# Patient Record
Sex: FEMALE | Race: BLACK OR AFRICAN AMERICAN | Employment: OTHER | URBAN - METROPOLITAN AREA
[De-identification: names, ages, dates, MRNs, and addresses within clinical notes are randomized per-mention and may not be internally consistent; named-entity substitution may affect disease eponyms.]

---

## 2017-01-09 ENCOUNTER — OFFICE VISIT (OUTPATIENT)
Dept: FAMILY MEDICINE CLINIC | Age: 62
End: 2017-01-09

## 2017-01-09 VITALS
RESPIRATION RATE: 17 BRPM | OXYGEN SATURATION: 98 % | WEIGHT: 141 LBS | TEMPERATURE: 98.1 F | SYSTOLIC BLOOD PRESSURE: 136 MMHG | DIASTOLIC BLOOD PRESSURE: 86 MMHG | HEART RATE: 90 BPM | HEIGHT: 66 IN | BODY MASS INDEX: 22.66 KG/M2

## 2017-01-09 DIAGNOSIS — J18.9 PNEUMONIA DUE TO INFECTIOUS ORGANISM, UNSPECIFIED LATERALITY, UNSPECIFIED PART OF LUNG: Primary | ICD-10-CM

## 2017-01-09 RX ORDER — PREDNISONE 10 MG/1
10 TABLET ORAL
COMMUNITY
Start: 2017-01-03 | End: 2017-06-21 | Stop reason: ALTCHOICE

## 2017-01-09 RX ORDER — LEVOFLOXACIN 500 MG/1
500 TABLET, FILM COATED ORAL DAILY
COMMUNITY
Start: 2017-01-03 | End: 2017-06-21

## 2017-01-09 NOTE — PROGRESS NOTES
Subjective:   Andrew Marie is a 63 y.o. female who was seen at [address] 1/3/17 and was diagnosed with pneumonia. She was treated with levaquin and prednisone and has not quite completed course. Feeling better but still with nagging cough - nonproductive. No recent fever. She denies a history of COPD or asthma, but had some wheezing with this illness. Tried OTC cold remedies with temporary relief. No evaluation to date. Past Medical History   Diagnosis Date    Cancer [facility])      Social History   Substance Use Topics    Smoking status: Never Smoker    Smokeless tobacco: Never Used    Alcohol use None     No current outpatient prescriptions on file prior to visit. No current facility-administered medications on file prior to visit. Allergies   Allergen Reactions    Pcn [Penicillins] Hives        Review of Systems  Pertinent items are noted in HPI. Objective:     Visit Vitals    /86 (BP 1 Location: Left arm, BP Patient Position: Sitting)    Pulse 90    Temp 98.1 °F (36.7 °C) (Oral)    Resp 17    Ht 5' 6\" (1.676 m)    Wt 141 lb (64 kg)    SpO2 98%    BMI 22.76 kg/m2     General:   alert, cooperative and no distress   Eyes: conjunctivae/scleras clear. PERRL, EOM's intact   Ears: External auditory canals clear, tympanic membranes clear   Sinuses/Nose: No maxillary or frontal tenderness. no rhinorrhea present. Mouth:  No oral lesions, mild pharyngeal erythema, no exudates   Neck: Supple, trachea midline   Heart: S1 and S2 normal,no murmurs noted    Lungs: Clear to auscultation bilaterally, no increased work of breathing   Abdomen: Soft, nontender. Normal bowel sounds   Extremities: No edema or cyanosis              Assessment/Plan:       ICD-10-CM ICD-9-CM    1. Pneumonia due to infectious organism, unspecified laterality, unspecified part of lung J18.9 136.9      484.8    Treated at outside facility, appears to be resolving well. Ok to return to work.       Orders Placed This Encounter    levoFLOXacin (LEVAQUIN) 500 mg tablet     Sig: Take 500 mg by mouth daily.  predniSONE (DELTASONE) 10 mg tablet     Sig: Take 10 mg by mouth. Verbal and written instructions (see AVS) provided. Patient expresses understanding of diagnosis and treatment plan.

## 2017-01-09 NOTE — MR AVS SNAPSHOT
Visit Information Date & Time Provider Department Dept. Phone Encounter #  
 1/9/2017  2:00 PM [facility] [phone] [id number] Upcoming Health Maintenance Date Due Hepatitis C Screening 1955 COLONOSCOPY 12/3/1975 DTaP/Tdap/Td series (1 - Tdap) 12/3/1978 PAP AKA CERVICAL CYTOLOGY 12/3/1978 INFLUENZA AGE 9 TO ADULT 8/1/2016 BREAST CANCER SCRN MAMMOGRAM 12/3/2017 Allergies as of 1/9/2017  Review Complete On: 1/9/2017 By: Aleta Yost MD  
  
 Severity Noted Reaction Type Reactions Pcn [Penicillins]  09/28/2010    Hives Current Immunizations  Reviewed on 1/19/2012 Name Date Influenza Vaccine Split 1/19/2012 Not reviewed this visit You Were Diagnosed With   
  
 Codes Comments Pneumonia due to infectious organism, unspecified laterality, unspecified part of lung    -  Primary ICD-10-CM: J18.9 ICD-9-CM: 136.9, 484.8 Vitals BP Pulse Temp Resp Height(growth percentile) Weight(growth percentile) 136/86 (BP 1 Location: Left arm, BP Patient Position: Sitting) 90 98.1 °F (36.7 °C) (Oral) 17 5' 6\" (1.676 m) 141 lb (64 kg) SpO2 BMI OB Status Smoking Status 98% 22.76 kg/m2 Postmenopausal Never Smoker BMI and BSA Data Body Mass Index Body Surface Area  
 22.76 kg/m 2 1.73 m 2 Preferred Pharmacy Pharmacy Name Phone [facility] [address] [facility] [address] [facility] [phone] Your Updated Medication List  
  
   
This list is accurate as of: 1/9/17  2:38 PM.  Always use your most recent med list.  
  
  
  
  
 levoFLOXacin 500 mg tablet Commonly known as:  Jemima Pih Take 500 mg by mouth daily. predniSONE 10 mg tablet Commonly known as:  Roe Razor Take 10 mg by mouth. Introducing [facility]!    
 [facility] introduces [facility] patient portal. Now you can access parts of your medical record, email your doctor's office, and request medication refills online. 1. In your internet browser, go to [email or web address] 2. Click on the First Time User? Click Here link in the Sign In box. You will see the New Member Sign Up page. 3. Enter your Rated People Access Code exactly as it appears below. You will not need to use this code after youve completed the sign-up process. If you do not sign up before the expiration date, you must request a new code. · Rated People Access Code: [email or web address] Expires: 4/9/2017  2:38 PM 
 
4. Enter the last four digits of your Social Security Number (xxxx) and Date of Birth (mm/dd/yyyy) as indicated and click Submit. You will be taken to the next sign-up page. 5. Create a Rated People ID. This will be your Rated People login ID and cannot be changed, so think of one that is secure and easy to remember. 6. Create a Rated People password. You can change your password at any time. 7. Enter your Password Reset Question and Answer. This can be used at a later time if you forget your password. 8. Enter your e-mail address. You will receive e-mail notification when new information is available in [address] 9. Click Sign Up. You can now view and download portions of your medical record. 10. Click the Download Summary menu link to download a portable copy of your medical information. If you have questions, please visit the Frequently Asked Questions section of the Rated People website. Remember, Rated People is NOT to be used for urgent needs. For medical emergencies, dial 911. Now available from your iPhone and Android! Please provide this summary of care documentation to your next provider. Your primary care clinician is listed as [address] If you have any questions after today's visit, please call [phone].

## 2017-04-04 ENCOUNTER — HOSPITAL ENCOUNTER (OUTPATIENT)
Dept: MAMMOGRAPHY | Age: 62
Discharge: HOME | End: 2017-04-04
Attending: FAMILY MEDICINE
Payer: COMMERCIAL

## 2017-04-04 DIAGNOSIS — Z12.31 VISIT FOR SCREENING MAMMOGRAM: ICD-10-CM

## 2017-04-04 PROCEDURE — 77067 SCR MAMMO BI INCL CAD: CPT

## 2017-06-21 ENCOUNTER — OFFICE VISIT (OUTPATIENT)
Dept: FAMILY MEDICINE CLINIC | Age: 62
End: 2017-06-21

## 2017-06-21 VITALS
HEIGHT: 66 IN | BODY MASS INDEX: 21.69 KG/M2 | RESPIRATION RATE: 14 BRPM | OXYGEN SATURATION: 99 % | WEIGHT: 135 LBS | HEART RATE: 55 BPM | DIASTOLIC BLOOD PRESSURE: 79 MMHG | TEMPERATURE: 98.1 F | SYSTOLIC BLOOD PRESSURE: 156 MMHG

## 2017-06-21 DIAGNOSIS — R41.3 EPISODIC MEMORY LOSS: Primary | ICD-10-CM

## 2017-06-21 DIAGNOSIS — Z85.3 HISTORY OF BREAST CANCER: ICD-10-CM

## 2017-06-21 DIAGNOSIS — Z11.59 NEED FOR HEPATITIS C SCREENING TEST: ICD-10-CM

## 2017-06-21 DIAGNOSIS — Z13.220 SCREENING CHOLESTEROL LEVEL: ICD-10-CM

## 2017-06-21 DIAGNOSIS — Z13.29 THYROID DISORDER SCREEN: ICD-10-CM

## 2017-06-21 DIAGNOSIS — Z12.11 SPECIAL SCREENING FOR MALIGNANT NEOPLASM OF COLON: ICD-10-CM

## 2017-06-21 LAB
BILIRUB UR QL STRIP: NEGATIVE
GLUCOSE UR-MCNC: NEGATIVE MG/DL
KETONES P FAST UR STRIP-MCNC: NEGATIVE MG/DL
PH UR STRIP: 6 [PH] (ref 4.6–8)
PROT UR QL STRIP: NEGATIVE MG/DL
SP GR UR STRIP: 1.01 (ref 1–1.03)
UA UROBILINOGEN AMB POC: NORMAL (ref 0.2–1)
URINALYSIS CLARITY POC: CLEAR
URINALYSIS COLOR POC: YELLOW
URINE BLOOD POC: NEGATIVE
URINE LEUKOCYTES POC: NORMAL
URINE NITRITES POC: NEGATIVE

## 2017-06-21 NOTE — LETTER
NOTIFICATION RETURN TO WORK / SCHOOL 
 
6/21/2017 2:55 PM 
 
Ms. Terrance Del Toro [address] [place] [id number] To Whom It May Concern: 
 
Terrance Del Toro is currently under the care of [address] [place] [address]. She was seen in our office today June 21, 2017 and may return back to work on June 22, 2017. If there are questions or concerns please have the patient contact our office. Sincerely, Fadia Quiñonez NP

## 2017-06-21 NOTE — MR AVS SNAPSHOT
Visit Information Date & Time Provider Department Dept. Phone Encounter #  
 6/21/2017  2:00 PM Michael Robledo [place] [id number] [phone] [id number] Follow-up Instructions Return in about 4 weeks (around 7/19/2017), or if symptoms worsen or fail to improve. Upcoming Health Maintenance Date Due Hepatitis C Screening 1955 DTaP/Tdap/Td series (1 - Tdap) 12/3/1978 PAP AKA CERVICAL CYTOLOGY 12/3/1978 COLONOSCOPY 6/6/2016 INFLUENZA AGE 9 TO ADULT 8/1/2017 BREAST CANCER SCRN MAMMOGRAM 4/4/2018 Allergies as of 6/21/2017  Review Complete On: 6/21/2017 By: Jaimee Hernández Severity Noted Reaction Type Reactions Pcn [Penicillins]  09/28/2010    Hives Current Immunizations  Reviewed on 1/19/2012 Name Date Influenza Vaccine Split 1/19/2012 Not reviewed this visit You Were Diagnosed With   
  
 Codes Comments Episodic memory loss    -  Primary ICD-10-CM: R41.3 ICD-9-CM: 780.93 History of breast cancer     ICD-10-CM: Z85.3 ICD-9-CM: V10.3 Need for hepatitis C screening test     ICD-10-CM: Z11.59 
ICD-9-CM: V73.89 Thyroid disorder screen     ICD-10-CM: Z13.29 ICD-9-CM: V77.0 Screening cholesterol level     ICD-10-CM: P57.287 ICD-9-CM: V77.91 Special screening for malignant neoplasm of colon     ICD-10-CM: Z12.11 ICD-9-CM: V76.51 Vitals BP Pulse Temp Resp Height(growth percentile) Weight(growth percentile) 156/79 (BP 1 Location: Left arm, BP Patient Position: Sitting) (!) 55 98.1 °F (36.7 °C) (Oral) 14 5' 6\" (1.676 m) 135 lb (61.2 kg) SpO2 BMI OB Status Smoking Status 99% 21.79 kg/m2 Postmenopausal Never Smoker Vitals History BMI and BSA Data Body Mass Index Body Surface Area 21.79 kg/m 2 1.69 m 2 Preferred Pharmacy Pharmacy Name Phone [facility] [address] [facility] [address] [phone] Your Updated Medication List  
  
Notice  As of 6/21/2017  2:47 PM  
 You have not been prescribed any medications. We Performed the Following AMB POC URINALYSIS DIP STICK AUTO W/O MICRO [99946 CPT(R)] CBC W/O DIFF [77296 CPT(R)] HEPATITIS C AB [51133 CPT(R)] LIPID PANEL [61786 CPT(R)] METABOLIC PANEL, COMPREHENSIVE [16770 CPT(R)] REFERRAL FOR COLONOSCOPY [BET772 Custom] Comments:  
 Please evaluate patient for colonoscopy, last done 6/6/11 and had polyp removed. T4, FREE [id number] CPT(R)] TSH 3RD GENERATION [51786 CPT(R)] Follow-up Instructions Return in about 4 weeks (around 7/19/2017), or if symptoms worsen or fail to improve. Referral Information Referral ID Referred By Referred To  
  
 [id number] [facility] [place] [address] [place], [address] [facility] Visits Status Start Date End Date 1 New Request 6/21/17 6/21/18 If your referral has a status of pending review or denied, additional information will be sent to support the outcome of this decision. Patient Instructions Learning About Chemo Brain What is chemo brain? Chemo brain is a problem with thinking and memory that can happen during and especially after chemotherapy treatment for cancer. It can make it hard for you to think, concentrate, and do tasks. You may have trouble remembering things. And you may feel like your brain isn't working right. It can be frightening to have this happen, especially during an already stressful time. These problems can be mild. But they can be so serious that people have a hard time working or doing their daily activities. What causes chemo brain? These thinking and memory problems may be caused by chemotherapy medicines used to treat cancer. They could occur because of the cancer itself and maybe because of other medicines used to treat cancer.  The anxiety and stress of having cancer also may make it harder to think and remember. What are the symptoms? Symptoms vary depending on the person. But you may: · Forget events, names, or other things. · Have trouble thinking of certain words when you talk. · Have trouble learning new things. · Take longer to do routine tasks. · Have trouble concentrating or feel like your mind is in a fog. How is chemo brain treated? Chemo brain may go away when treatment ends. If your symptoms are mild, they may go away without treatment. If your symptoms are very bad, your doctor may suggest that you see a specialist who is an expert in thinking and memory problems. What can you do to cope? Take care of yourself · Try to relax to reduce your stress. Meditate, or do yoga or another relaxing activity. · Try to be patient with yourself. The problem may go away with time. · Get plenty of sleep. · Eat a healthy diet. · Be as physically active as you can. But check with your doctor to make sure that you don't do too much too soon. · Keep your brain active by reading and doing puzzles, games, or crosswords. Use memory aids · Use sticky notes and calendars to help you remember events and tasks. · Carry a notebook to write down important dates, to-do lists, and names of people. · Try to have a routine for daily tasks so you get used to doing the same things in the same order every day. · Bring a family member or friend to doctor visits. Or use your phone or another device to record your talk with your doctor. · Keep a diary or journal. Write down when your mind feels the most clear and when you have trouble. Note how much sleep you had, if you were stressed, or other things that happened. Seek support · Tell your family and close friends about the problem so they know what's going on if you forget words or seem foggy. Suggest ways they can help you.  
· See an oncology social worker if you are having trouble coping with memory problems. · Think about joining a support group for people in cancer treatment. They may have the same problems. You can share ideas. Follow-up care is a key part of your treatment and safety. Be sure to make and go to all appointments, and call your doctor if you are having problems. It's also a good idea to know your test results and keep a list of the medicines you take. Where can you learn more? Go to http://shelby-shanika.info/. Enter O958 in the search box to learn more about \"Learning About Chemo Brain. \" Current as of: October 4, 2016 Content Version: 11.3 © 2529-4110 [employer]. Care instructions adapted under license by Monitor[employer] (which disclaims liability or warranty for this information). If you have questions about a medical condition or this instruction, always ask your healthcare professional. [address] any warranty or liability for your use of this information. Introducing [facility]! Eri Gardner introduces [employer] Ziggy patient portal. Now you can access parts of your medical record, email your doctor's office, and request medication refills online. 1. In your internet browser, go to [email or web address] 2. Click on the First Time User? Click Here link in the Sign In box. You will see the New Member Sign Up page. 3. Enter your Ask Ziggy Access Code exactly as it appears below. You will not need to use this code after youve completed the sign-up process. If you do not sign up before the expiration date, you must request a new code. · Ask Ziggy Access Code: [facility] Expires: 9/19/2017  1:40 PM 
 
4. Enter the last four digits of your Social Security Number (xxxx) and Date of Birth (mm/dd/yyyy) as indicated and click Submit. You will be taken to the next sign-up page. 5. Create a Ask Ziggy ID.  This will be your Ask Ziggy login ID and cannot be changed, so think of one that is secure and easy to remember. 6. Create a Omek Interactive password. You can change your password at any time. 7. Enter your Password Reset Question and Answer. This can be used at a later time if you forget your password. 8. Enter your e-mail address. You will receive e-mail notification when new information is available in [address] 9. Click Sign Up. You can now view and download portions of your medical record. 10. Click the Download Summary menu link to download a portable copy of your medical information. If you have questions, please visit the Frequently Asked Questions section of the [employer]k Interactive website. Remember, [employer] is NOT to be used for urgent needs. For medical emergencies, dial 911. Now available from your iPhone and Android! Please provide this summary of care documentation to your next provider. Your primary care clinician is listed as NAYAN BRADY. If you have any questions after today's visit, please call [phone].

## 2017-06-21 NOTE — PATIENT INSTRUCTIONS
Learning About Chemo Brain  What is chemo brain? Chemo brain is a problem with thinking and memory that can happen during and especially after chemotherapy treatment for cancer. It can make it hard for you to think, concentrate, and do tasks. You may have trouble remembering things. And you may feel like your brain isn't working right. It can be frightening to have this happen, especially during an already stressful time. These problems can be mild. But they can be so serious that people have a hard time working or doing their daily activities. What causes chemo brain? These thinking and memory problems may be caused by chemotherapy medicines used to treat cancer. They could occur because of the cancer itself and maybe because of other medicines used to treat cancer. The anxiety and stress of having cancer also may make it harder to think and remember. What are the symptoms? Symptoms vary depending on the person. But you may:  · Forget events, names, or other things. · Have trouble thinking of certain words when you talk. · Have trouble learning new things. · Take longer to do routine tasks. · Have trouble concentrating or feel like your mind is in a fog. How is chemo brain treated? Chemo brain may go away when treatment ends. If your symptoms are mild, they may go away without treatment. If your symptoms are very bad, your doctor may suggest that you see a specialist who is an expert in thinking and memory problems. What can you do to cope? Take care of yourself  · Try to relax to reduce your stress. Meditate, or do yoga or another relaxing activity. · Try to be patient with yourself. The problem may go away with time. · Get plenty of sleep. · Eat a healthy diet. · Be as physically active as you can. But check with your doctor to make sure that you don't do too much too soon. · Keep your brain active by reading and doing puzzles, games, or crosswords.   Use memory aids  · Use sticky notes and calendars to help you remember events and tasks. · Carry a notebook to write down important dates, to-do lists, and names of people. · Try to have a routine for daily tasks so you get used to doing the same things in the same order every day. · Bring a family member or friend to doctor visits. Or use your phone or another device to record your talk with your doctor. · Keep a diary or journal. Write down when your mind feels the most clear and when you have trouble. Note how much sleep you had, if you were stressed, or other things that happened. Seek support  · Tell your family and close friends about the problem so they know what's going on if you forget words or seem foggy. Suggest ways they can help you. · See an oncology social worker if you are having trouble coping with memory problems. · Think about joining a support group for people in cancer treatment. They may have the same problems. You can share ideas. Follow-up care is a key part of your treatment and safety. Be sure to make and go to all appointments, and call your doctor if you are having problems. It's also a good idea to know your test results and keep a list of the medicines you take. Where can you learn more? Go to http://shelby-shanika.info/. Enter T480 in the search box to learn more about \"Learning About Chemo Brain. \"  Current as of: October 4, 2016  Content Version: 11.3  © 0760-8360 [employer]. Care instructions adapted under license by [employer] (which disclaims liability or warranty for this information). If you have questions about a medical condition or this instruction, always ask your healthcare professional. [address] any warranty or liability for your use of this information.

## 2017-06-21 NOTE — PROGRESS NOTES
Chief Complaint   Patient presents with    Memory Loss     forgetful     Patient states \" I went to the place I grew up at and couldn't remember my way. It is a place [facility]. I was going to a block party. I could not remember how to get there or get back. My sister had to carry me home. I am forgetting to pay my bills sometimes. Sometimes I can not find my keys. \"      Patient is due for a pap smear.

## 2017-06-21 NOTE — PROGRESS NOTES
Subjective:     Chief Complaint   Patient presents with    Memory Loss     forgetful        She  is a 63 y.o. female who presents for evaluation of forgetfulness. Says that for the past month she has been forgetting to pay her bills, recently went to the area that she grew up at and could not remember how to get around. She says she often loses her keys. Once, she went out for lunch at work and could not remember how to get back to where she worked, says that she had to sit and think and then remembered. She admits that she has had increase stress at work. She says she has not been sleeping well for about 1-2 months. She says she wakes up at 2am and then cannot fall back asleep (but goes to bed at 7pm and sleeps until 2 am, normally has to get up at 3:30am to go to work). She says she feels tired much of the time. Denies any trauma to her head. She says she eats \"pretty good\" but lately she says she goes \"all day without feeling like eating and then some days my appetite is real good\". She says she drinks plenty of water. She denies any vision changes. She denies any family history of dementia or alzheimers  She denies and tick bites. Does not drink or smoke. Is not on any daily medication. She has been working at the same job for 15 years. Does 10 minutes of exercise per day at work and her job is pretty active. No new stressors at home. She does recall that she had breast cancer and took tamoxifen for 5 years, completed in 2013.       ROS:  Gen: no fever, chills  Eyes: no excessive tearing, itching, or discharge  Nose: no rhinorrhea, no sinus pain  Mouth: no oral lesions, no sore throat  Resp: no shortness of breath, no wheezing, no cough  CV: no chest pain, no paroxysmal nocturnal dyspnea  Abd: no nausea, no heartburn, no diarrhea, no constipation, no abdominal pain  Neuro: no headaches, no syncope or presyncopal episodes  Endo: +polyuria, says that she gets up 2-3 times per night to urinate, no polydipsia  Heme: no lymphadenopathy, no easy bruising or bleeding  Rest per HPI    Past Medical History:   Diagnosis Date    Breast cancer ([address] 2008    Left    Cancer [facility])     Radiation therapy complication     Lt breast 2008--no chemo     Past Surgical History:   Procedure Laterality Date    HX BREAST LUMPECTOMY Left 2008    JAVIER US BX BREAST LT 1ST LESION W/CLIP AND SPECIMEN Left 2008    multiple areas sampled--one area breast CA     Current Outpatient Prescriptions on File Prior to Visit   Medication Sig Dispense Refill    levoFLOXacin (LEVAQUIN) 500 mg tablet Take 500 mg by mouth daily.  predniSONE (DELTASONE) 10 mg tablet Take 10 mg by mouth. No current facility-administered medications on file prior to visit. Objective:     Vitals:    06/21/17 1351   BP: 156/79   Pulse: (!) 55   Resp: 14   Temp: 98.1 °F (36.7 °C)   TempSrc: Oral   SpO2: 99%   Weight: 135 lb (61.2 kg)   Height: 5' 6\" (1.676 m)     Gen: alert, oriented, no acute distress  Head: normocephalic, atraumatic  Ears: external auditory canals clear, TMs without erythema or effusion  Eyes: pupils equal round reactive to light, sclera clear, conjunctiva clear  Nose: normal turbinates, no rhinorrhea  Oral: moist mucus membranes, no oral lesions, no pharyngeal inflammation or exudate  Neck: supple, no lymphadenopathy  Resp: no increased work of breathing, lungs clear to ausculation bilaterally  CV: S1, S2 normal, no murmurs, rubs, or gallops. Abd: soft, not tender, not distended. No hepatosplenomegaly. Normal bowel sounds. No hernias. Neuro: cranial nerves intact, normal strength and movement in all extremities, reflexes and sensation intact and symmetric. Skin: no lesion or rash    MME in office normal.   Able to recall events and numbers and birthdays and phone numbers.     Results for orders placed or performed in visit on 06/21/17   AMB POC URINALYSIS DIP STICK AUTO W/O MICRO   Result Value Ref Range Color (UA POC) Yellow     Clarity (UA POC) Clear     Glucose (UA POC) Negative Negative    Bilirubin (UA POC) Negative Negative    Ketones (UA POC) Negative Negative    Specific gravity (UA POC) 1.015 1.001 - 1.035    Blood (UA POC) Negative Negative    pH (UA POC) 6.0 4.6 - 8.0    Protein (UA POC) Negative Negative mg/dL    Urobilinogen (UA POC) 0.2 mg/dL 0.2 - 1    Nitrites (UA POC) Negative Negative    Leukocyte esterase (UA POC) Trace Negative         Assessment/ Plan:   Differential diagnosis and treatment options reviewed with patient who is in agreement with treatment plan as outlined below. ICD-10-CM ICD-9-CM    1. Episodic memory loss R41.3 780.93 AMB POC URINALYSIS DIP STICK AUTO W/O MICRO      TSH 3RD GENERATION      T4, FREE      METABOLIC PANEL, COMPREHENSIVE      CBC W/O DIFF   2. History of breast cancer Z85.3 V10.3    3. Need for hepatitis C screening test Z11.59 V73.89 HEPATITIS C AB   4. Thyroid disorder screen Z13.29 V77.0 TSH 3RD GENERATION      T4, FREE   5. Screening cholesterol level Z13.220 V77.91 LIPID PANEL   6. Special screening for malignant neoplasm of colon Z12.11 V76.51 REFERRAL FOR COLONOSCOPY     Labs today . If all normal, will set up with neuro for further eval.    ? Potential residual \"chemo brain\". Encouraged patient to eat well balanced  diet high in raw fruits and vegetables, lean protein and good fats. Limit refined, processed carbohydrates and sugar. Encouraged regular exercise and adequate water hydration. Asked her to journal events that she is forgetting and bring to follow up. I have discussed the diagnosis with the patient and the intended plan as seen in the above orders. The patient has received an after-visit summary and questions were answered concerning future plans. I have discussed medication side effects and warnings with the patient as well. The patient verbalizes understanding and agreement with the plan.     Follow-up Disposition:  Return in about 4 weeks (around 7/19/2017), or if symptoms worsen or fail to improve.

## 2017-06-22 LAB
ALBUMIN SERPL-MCNC: 4.7 G/DL (ref 3.5–5.5)
ALBUMIN/GLOB SERPL: 1.9 {RATIO} (ref 1.2–2.2)
ALP SERPL-CCNC: 32 IU/L (ref 39–117)
ALT SERPL-CCNC: 21 IU/L (ref 0–32)
AST SERPL-CCNC: 22 IU/L (ref 0–40)
BILIRUB SERPL-MCNC: 0.8 MG/DL (ref 0–1.2)
BUN SERPL-MCNC: 14 MG/DL (ref 6–24)
BUN/CREAT SERPL: 22 (ref 9–23)
CALCIUM SERPL-MCNC: 10 MG/DL (ref 8.7–10.2)
CHLORIDE SERPL-SCNC: 102 MMOL/L (ref 96–106)
CHOLEST SERPL-MCNC: 237 MG/DL (ref 100–199)
CO2 SERPL-SCNC: 25 MMOL/L (ref 18–29)
CREAT SERPL-MCNC: 0.65 MG/DL (ref 0.57–1)
ERYTHROCYTE [DISTWIDTH] IN BLOOD BY AUTOMATED COUNT: 13 % (ref 12.3–15.4)
GLOBULIN SER CALC-MCNC: 2.5 G/DL (ref 1.5–4.5)
GLUCOSE SERPL-MCNC: 84 MG/DL (ref 65–99)
HCT VFR BLD AUTO: 35.2 % (ref 34–46.6)
HCV AB S/CO SERPL IA: <0.1 S/CO RATIO (ref 0–0.9)
HDLC SERPL-MCNC: 85 MG/DL
HGB BLD-MCNC: 12.1 G/DL (ref 11.1–15.9)
INTERPRETATION, 910389: NORMAL
LDLC SERPL CALC-MCNC: 143 MG/DL (ref 0–99)
MCH RBC QN AUTO: 28.2 PG (ref 26.6–33)
MCHC RBC AUTO-ENTMCNC: 34.4 G/DL (ref 31.5–35.7)
MCV RBC AUTO: 82 FL (ref 79–97)
PLATELET # BLD AUTO: 280 X10E3/UL (ref 150–379)
POTASSIUM SERPL-SCNC: 4.3 MMOL/L (ref 3.5–5.2)
PROT SERPL-MCNC: 7.2 G/DL (ref 6–8.5)
RBC # BLD AUTO: 4.29 X10E6/UL (ref 3.77–5.28)
SODIUM SERPL-SCNC: 143 MMOL/L (ref 134–144)
T4 FREE SERPL-MCNC: 0.91 NG/DL (ref 0.82–1.77)
TRIGL SERPL-MCNC: 45 MG/DL (ref 0–149)
TSH SERPL DL<=0.005 MIU/L-ACNC: 3.07 UIU/ML (ref 0.45–4.5)
VLDLC SERPL CALC-MCNC: 9 MG/DL (ref 5–40)
WBC # BLD AUTO: 3.7 X10E3/UL (ref 3.4–10.8)

## 2017-06-30 ENCOUNTER — TELEPHONE (OUTPATIENT)
Dept: FAMILY MEDICINE CLINIC | Age: 62
End: 2017-06-30

## 2017-06-30 DIAGNOSIS — E78.5 HYPERLIPIDEMIA, UNSPECIFIED HYPERLIPIDEMIA TYPE: ICD-10-CM

## 2017-06-30 DIAGNOSIS — R41.3 MEMORY LOSS: Primary | ICD-10-CM

## 2017-06-30 RX ORDER — PRAVASTATIN SODIUM 10 MG/1
10 TABLET ORAL
Qty: 30 TAB | Refills: 6 | Status: SHIPPED | OUTPATIENT
Start: 2017-06-30 | End: 2018-09-07 | Stop reason: SDUPTHER

## 2017-06-30 NOTE — PROGRESS NOTES
Called patient . Patient verified her name and . Patient given results per [facility], NP's result note. Patient will start statin therapy please send to [facility]. Please put in order for CT at [facility]. Patient verbalized understanding.

## 2017-06-30 NOTE — PROGRESS NOTES
I tried to call her last week but did not get an answer. Can you try to call her again please. Her labs are back. Cholesterol level is a little more elevated and she should consider starting a statin to lower her cardiovascular/ neurological  risks. Let me know if she is ok with that and I will send to her pharmacy (what pharmacy would she like to use?). She should also take a baby aspirin daily with food. As for her memory, I would like to order a CT of her head and refer to see neurology for further evaluation of her memory complaints. Would she be ok going to [id number] [facility] for the CT and neurology?     Thanks  Jose Maldonado [facility]

## 2017-08-07 ENCOUNTER — HOSPITAL ENCOUNTER (OUTPATIENT)
Dept: CT IMAGING | Age: 62
Discharge: HOME | End: 2017-08-07
Attending: NURSE PRACTITIONER
Payer: COMMERCIAL

## 2017-08-07 DIAGNOSIS — R41.3 MEMORY LOSS: ICD-10-CM

## 2017-08-07 PROCEDURE — 70450 CT HEAD/BRAIN W/O DYE: CPT

## 2017-08-09 ENCOUNTER — HOSPITAL ENCOUNTER (OUTPATIENT)
Dept: LAB | Age: 62
Discharge: HOME | End: 2017-08-09
Payer: COMMERCIAL

## 2017-08-09 ENCOUNTER — OFFICE VISIT (OUTPATIENT)
Dept: FAMILY MEDICINE CLINIC | Age: 62
End: 2017-08-09

## 2017-08-09 VITALS
RESPIRATION RATE: 16 BRPM | TEMPERATURE: 98.4 F | HEART RATE: 48 BPM | HEIGHT: 66 IN | WEIGHT: 135.2 LBS | BODY MASS INDEX: 21.73 KG/M2 | OXYGEN SATURATION: 100 % | DIASTOLIC BLOOD PRESSURE: 76 MMHG | SYSTOLIC BLOOD PRESSURE: 128 MMHG

## 2017-08-09 DIAGNOSIS — E78.5 HYPERLIPIDEMIA, UNSPECIFIED HYPERLIPIDEMIA TYPE: ICD-10-CM

## 2017-08-09 DIAGNOSIS — R41.3 MEMORY LOSS: ICD-10-CM

## 2017-08-09 DIAGNOSIS — Z23 NEED FOR TDAP VACCINATION: ICD-10-CM

## 2017-08-09 DIAGNOSIS — Z01.419 ENCOUNTER FOR WELL WOMAN EXAM WITH ROUTINE GYNECOLOGICAL EXAM: Primary | ICD-10-CM

## 2017-08-09 PROCEDURE — 87624 HPV HI-RISK TYP POOLED RSLT: CPT | Performed by: NURSE PRACTITIONER

## 2017-08-09 PROCEDURE — 88175 CYTOPATH C/V AUTO FLUID REDO: CPT | Performed by: NURSE PRACTITIONER

## 2017-08-09 RX ORDER — ASPIRIN 81 MG/1
81 TABLET ORAL DAILY
Qty: 30 TAB | Refills: 0 | Status: SHIPPED | OUTPATIENT
Start: 2017-08-09 | End: 2019-06-27

## 2017-08-09 NOTE — PROGRESS NOTES
Chief Complaint   Patient presents with    Complete Physical     w/Pap     1. Have you been to the ER, urgent care clinic since your last visit? Hospitalized since your last visit? No    2. Have you seen or consulted any other health care providers outside of the [address] [place] [address] since your last visit? Include any pap smears or colon screening.  No

## 2017-08-09 NOTE — MR AVS SNAPSHOT
Visit Information Date & Time Provider Department Dept. Phone Encounter #  
 8/9/2017 10:30 AM Luis Moffett NP Deonna [facility]comb [place] [id number] [phone] [id number] Upcoming Health Maintenance Date Due DTaP/Tdap/Td series (1 - Tdap) 12/3/1978 PAP AKA CERVICAL CYTOLOGY 12/3/1978 COLONOSCOPY 6/6/2016 BREAST CANCER SCRN MAMMOGRAM 4/4/2018 Allergies as of 8/9/2017  Review Complete On: 8/9/2017 By: Luis Moffett NP Severity Noted Reaction Type Reactions Pcn [Penicillins]  09/28/2010    Hives Current Immunizations  Reviewed on 1/19/2012 Name Date Influenza Vaccine Split 1/19/2012 Not reviewed this visit You Were Diagnosed With   
  
 Codes Comments Encounter for well woman exam with routine gynecological exam    -  Primary ICD-10-CM: V62.271 ICD-9-CM: V72.31 Memory loss     ICD-10-CM: R41.3 ICD-9-CM: 780.93 Hyperlipidemia, unspecified hyperlipidemia type     ICD-10-CM: E78.5 ICD-9-CM: 272.4 Vitals BP Pulse Temp Resp Height(growth percentile) Weight(growth percentile) 128/76 (!) 48 98.4 °F (36.9 °C) (Oral) 16 5' 6\" (1.676 m) 135 lb 3.2 oz (61.3 kg) SpO2 BMI OB Status Smoking Status 100% 21.82 kg/m2 Postmenopausal Never Smoker Vitals History BMI and BSA Data Body Mass Index Body Surface Area  
 21.82 kg/m 2 1.69 m 2 Preferred Pharmacy Pharmacy Name Phone [facility] [address] [place] [address], [address] [place] [address] [facility] Your Updated Medication List  
  
   
This list is accurate as of: 8/9/17 11:44 AM.  Always use your most recent med list.  
  
  
  
  
 aspirin delayed-release 81 mg tablet Take 1 Tab by mouth daily. pravastatin 10 mg tablet Commonly known as:  PRAVACHOL Take 1 Tab by mouth nightly. Prescriptions Sent to Pharmacy Refills  
 aspirin delayed-release 81 mg tablet 0 Sig: Take 1 Tab by mouth daily.   
 Class: Normal  
 Pharmacy: [facility] [address] [place], [place]  #: [phone] Route: Oral  
  
We Performed the Following REFERRAL TO NEUROLOGY [FFU30 Custom] Comments:  
 Memory loss over the past few months, normal CT Referral Information Referral ID Referred By Referred To  
  
 [id number] CA[facility] [address] Loly Maria MD   
   [address] [place] [address] [address] Phone: [phone] Fax: [phone] Visits Status Start Date End Date 1 New Request 8/9/17 8/9/18 If your referral has a status of pending review or denied, additional information will be sent to support the outcome of this decision. Patient Instructions Well Visit, Women 48 to 72: Care Instructions Your Care Instructions Physical exams can help you stay healthy. Your doctor has checked your overall health and may have suggested ways to take good care of yourself. He or she also may have recommended tests. At home, you can help prevent illness with healthy eating, regular exercise, and other steps. Follow-up care is a key part of your treatment and safety. Be sure to make and go to all appointments, and call your doctor if you are having problems. It's also a good idea to know your test results and keep a list of the medicines you take. How can you care for yourself at home? · Reach and stay at a healthy weight. This will lower your risk for many problems, such as obesity, diabetes, heart disease, and high blood pressure. · Get at least 30 minutes of exercise on most days of the week. Walking is a good choice. You also may want to do other activities, such as running, swimming, cycling, or playing tennis or team sports. · Do not smoke. Smoking can make health problems worse. If you need help quitting, talk to your doctor about stop-smoking programs and medicines. These can increase your chances of quitting for good. · Protect your skin from too much sun. When you're outdoors from 10 a.m. to 4 p.m., stay in the shade or cover up with clothing and a hat with a wide brim. Wear sunglasses that block UV rays. Even when it's cloudy, put broad-spectrum sunscreen (SPF 30 or higher) on any exposed skin. · See a dentist one or two times a year for checkups and to have your teeth cleaned. · Wear a seat belt in the car. · Limit alcohol to 1 drink a day. Too much alcohol can cause health problems. Follow your doctor's advice about when to have certain tests. These tests can spot problems early. · Cholesterol. Your doctor will tell you how often to have this done based on your age, family history, or other things that can increase your risk for heart attack and stroke. · Blood pressure. Have your blood pressure checked during a routine doctor visit. Your doctor will tell you how often to check your blood pressure based on your age, your blood pressure results, and other factors. · Mammogram. Ask your doctor how often you should have a mammogram, which is an X-ray of your breasts. A mammogram can spot breast cancer before it can be felt and when it is easiest to treat. · Pap test and pelvic exam. Ask your doctor how often you should have a Pap test. You may not need to have a Pap test as often as you used to. · Vision. Have your eyes checked every year or two or as often as your doctor suggests. Some experts recommend that you have yearly exams for glaucoma and other age-related eye problems starting at age 48. · Hearing. Tell your doctor if you notice any change in your hearing. You can have tests to find out how well you hear. · Diabetes. Ask your doctor whether you should have tests for diabetes. · Colon cancer. You should begin tests for colon cancer at age 48. You may have one of several tests. Your doctor will tell you how often to have tests based on your age and risk.  Risks include whether you already had a precancerous polyp removed from your colon or whether your parents, sisters and brothers, or children have had colon cancer. · Thyroid disease. Talk to your doctor about whether to have your thyroid checked as part of a regular physical exam. Women have an increased chance of a thyroid problem. · Osteoporosis. You should begin tests for bone density at age 72. If you are younger than 72, ask your doctor whether you have factors that may increase your risk for this disease. You may want to have this test before age 72. · Heart attack and stroke risk. At least every 4 to 6 years, you should have your risk for heart attack and stroke assessed. Your doctor uses factors such as your age, blood pressure, cholesterol, and whether you smoke or have diabetes to show what your risk for a heart attack or stroke is over the next 10 years. When should you call for help? Watch closely for changes in your health, and be sure to contact your doctor if you have any problems or symptoms that concern you. Where can you learn more? Go to http://shelby-shanika.info/. Enter F550 in the search box to learn more about \"Well Visit, Women 50 to 72: Care Instructions. \" Current as of: July 19, 2016 Content Version: 11.3 © 5677-9340 [employer]. Care instructions adapted under license by [employer] (which disclaims liability or warranty for this information). If you have questions about a medical condition or this instruction, always ask your healthcare professional. [place] [address] any warranty or liability for your use of this information. Learning About High Cholesterol What is high cholesterol? Cholesterol is a type of fat in your blood. It is needed for many body functions, such as making new cells. Cholesterol is made by your body. It also comes from food you eat. If you have too much cholesterol, it starts to build up in your arteries. This is called hardening of the arteries, or atherosclerosis. High cholesterol raises your risk of a heart attack and stroke. There are different types of cholesterol. LDL is the \"bad\" cholesterol. High LDL can raise your risk for heart disease, heart attack, and stroke. HDL is the \"good\" cholesterol. High HDL is linked with a lower risk for heart disease, heart attack, and stroke. Your cholesterol levels help your doctor find out your risk for having a heart attack or stroke. How can you prevent high cholesterol? A heart-healthy lifestyle can help you prevent high cholesterol. This lifestyle helps lower your risk for a heart attack and stroke. · Eat heart-healthy foods. ¨ Eat fruits, vegetables, whole grains (like oatmeal), dried beans and peas, nuts and seeds, soy products (like tofu), and fat-free or low-fat dairy products. ¨ Replace butter, margarine, and hydrogenated or partially hydrogenated oils with olive and canola oils. (Canola oil margarine without trans fat is fine.) ¨ Replace red meat with fish, poultry, and soy protein (like tofu). ¨ Limit processed and packaged foods like chips, crackers, and cookies. · Be active. Exercise can improve your cholesterol level. Get at least 30 minutes of exercise on most days of the week. Walking is a good choice. You also may want to do other activities, such as running, swimming, cycling, or playing tennis or team sports. · Stay at a healthy weight. Lose weight if you need to. · Don't smoke. If you need help quitting, talk to your doctor about stop-smoking programs and medicines. These can increase your chances of quitting for good. How is high cholesterol treated? The goal of treatment is to reduce your chances of having a heart attack or stroke. The goal is not to lower your cholesterol numbers only. · You may make lifestyle changes, such as eating healthy foods, not smoking, losing weight, and being more active. · You may have to take medicine. Follow-up care is a key part of your treatment and safety. Be sure to make and go to all appointments, and call your doctor if you are having problems. It's also a good idea to know your test results and keep a list of the medicines you take. Where can you learn more? Go to http://shelby-shanika.info/. Enter Y615 in the search box to learn more about \"Learning About High Cholesterol. \" Current as of: April 3, 2017 Content Version: 11.3 © 6345-4045 Elias Borges [employer]. Care instructions adapted under license by [employer] (which disclaims liability or warranty for this information). If you have questions about a medical condition or this instruction, always ask your healthcare professional. [address] any warranty or liability for your use of this information. Introducing [facility]! Bhupinder Daly introduces [email or web address] patient portal. Now you can access parts of your medical record, email your doctor's office, and request medication refills online. 1. In your internet browser, go to [email or web address] 2. Click on the First Time User? Click Here link in the Sign In box. You will see the New Member Sign Up page. 3. Enter your [email or web address] Access Code exactly as it appears below. You will not need to use this code after youve completed the sign-up process. If you do not sign up before the expiration date, you must request a new code. · [email or web address] Access Code: [facility] Expires: 9/19/2017  1:40 PM 
 
4. Enter the last four digits of your Social Security Number (xxxx) and Date of Birth (mm/dd/yyyy) as indicated and click Submit. You will be taken to the next sign-up page. 5. Create a zePASSt ID. This will be your [email or web address] login ID and cannot be changed, so think of one that is secure and easy to remember. 6. Create a zePASSt password. You can change your password at any time. 7. Enter your Password Reset Question and Answer. This can be used at a later time if you forget your password. 8. Enter your e-mail address. You will receive e-mail notification when new information is available in [address] 9. Click Sign Up. You can now view and download portions of your medical record. 10. Click the Download Summary menu link to download a portable copy of your medical information. If you have questions, please visit the Frequently Asked Questions section of the [employer] website. Remember, [employer] is NOT to be used for urgent needs. For medical emergencies, dial 911. Now available from your iPhone and Android! Please provide this summary of care documentation to your next provider. Your primary care clinician is listed as NAYAN BRADY. If you have any questions after today's visit, please call [phone].

## 2017-08-09 NOTE — PROGRESS NOTES
Chief Complaint   Patient presents with    Complete Physical     w/Pap         S: Abdi Jansen is a 63 y.o. female No obstetric history on file. who presents for well woman exam and pap. Last pap- due today  Last Mammogram- history of breast cancer left, treated by Dr Alannah Dennis   Colonoscopy- done 5 years ago, due now. Has referral for colonoscopy but has not scheduled yet. Routine Labs reviewed- recently noted to have elevated cholesterol level and started on statin a few weeks ago. No LMP recorded. Patient is postmenopausal..    Sex-  Decreased sex drive since she was treated for breast CA (2013). Is followed by oncology   Birth control  Children- one daughter  Denies excessive cramping, bloating, moodiness or breast tenderness. Denies other GYN symptoms including discharge, itching. She does complain of decreased sex drive. She still complains of memory loss being of concern. Having issues with recall \"cant remember directions to places and I forget stuff all the time\". Head CT done a few days ago and normal.   No worsening of memory, no falls or head injury. No balance problems, no paralysis or chocking. No slurring of words or HA. Feels that her vision is normal.        Denies any systemic symptoms including fever, myalgias, chills, weakness, weight loss and fatigue. Denies respiratory symptoms including cough, SOB, or wheezing. Denies any cardiac symptoms including chest pain, tightness or discomfort or syncope. ROS is otherwise negative. Nutrition: eats well and takes multivitamin. Physical excercise: active daily  Social:  Denies any recent stressors.    Tobacco: Denies smoking or use of other tobacco products  Alcohol: Denies alcohol use    Past Medical History:   Diagnosis Date    Breast cancer ([facility] [address] 2008    Left    Cancer ([facility] [address]     Hyperlipidemia 6/30/2017    Radiation therapy complication     Lt breast 2008--no chemo     Past Surgical History:   Procedure Laterality Date  HX BREAST LUMPECTOMY Left 2008    JAVIER US BX BREAST LT 1ST LESION W/CLIP AND SPECIMEN Left 2008    multiple areas sampled--one area breast CA     Allergies   Allergen Reactions    Pcn [Penicillins] Hives     Current Outpatient Prescriptions   Medication Sig Dispense Refill    pravastatin (PRAVACHOL) 10 mg tablet Take 1 Tab by mouth nightly. 30 Tab 6         Agree with nurses note. O: VS:   Visit Vitals    /76    Pulse (!) 48    Temp 98.4 °F (36.9 °C) (Oral)    Resp 16    Ht 5' 6\" (1.676 m)    Wt 135 lb 3.2 oz (61.3 kg)    SpO2 100%    BMI 21.82 kg/m2     PAIN: No complaints of pain today. GENERAL: Meghan Palumbo is sitting on the table in no acute distress. Non-toxic. Well nourished. Well developed. Appropriately groomed. She is friendly, social and cooperative. HEAD:  Normocephalic. Atraumatic. Non tender sinuses x 4. EYE: PERRLA. EOMs intact. Sclera anicteric without injection. No drainage or discharge. EARS: Hearing intact bilaterally. External ear canals normal without evidence of blood or swelling. Bilateral TM's intact, pearly grey with landmarks visible. No erythema or effusion. NOSE: Patent. Nasal turbinates pink. No polyps noted. No erythema. No discharge. MOUTH: mucous membranes pink and moist. Posterior pharynx normal with cobblestone appearance. No erythema, white exudate or obstruction. NECK: supple. Midline trachea. No thyromegaly noted. RESP: Breath sounds are symmetrical bilaterally. Unlabored without SOB. Speaking in full sentences. Clear to auscultation bilaterally anteriorly and posteriorly. No wheezes. No rales or rhonchi. CV: normal rate. Regular rhythm. S1, S2 audible. No murmur noted. No rubs, clicks or gallops noted. ABDOMEN: Flat without bulges or pulsations. Soft and nondistended. No tenderness on palpation. No masses or organomegaly. No rebound, rigidity or guarding. Bowel sounds normal x 4 quadrants.    BACK: No visible deformities or curvature. Full ROM. No pain on palpation of the spinous processes in the cervical, thoracic, lumbar, sacral regions. No CVA tenderness. NEURO:  awake, alert and oriented to person, place, and time and event. Clear speech. Muscle strength is +5/5 x 4 extremities. Steady gait. MUSC:  Intact x 4 extremities. Full ROM x 4 extremities. No pain with movement. HEME/LYMPH: peripheral pulses palpable 2+ x 4 extremities. No peripheral edema is noted. No cervical adenopathy noted. SKIN: clean dry and intact throughout. No rashes, erythema, ecchymosis, lacerations, abrasions, suspicious moles noted. PSYCH: appropriate behavior, dress and thought processes. Good eye contact. Clear and coherent speech. Full affect. Good insight. GYN: Ext genitalia intact without inflammation, ulceration, lesions or discharge. No cysts. No tenderness. Vagina is pink, moist with ruggae. No areas of erythema or ulceration. Moderate, creamy clear discharge to vaginal walls. Cervix pink and moist with no ulcerations, nodules, masses, bleeding or discharge. Firm, mobile and non-tender with palpation. Adnexa normal in size without masses or tenderness. No CMT. Pap Smear - is completed today. BREASTS:  Breasts are symmetric with fibrocystic changes- lumpectomy on left breast.  No dominant, discrete, fixed  or suspicious masses are noted. No skin changes or axillary nodes. No nipple inversion or discharge.     MME in office normal score of 24     Mini Mental State Exam 8/9/2017   What is the Year 1   What is the Season 1   What is the Date 1   What is the Day 1   What is the Month 1   Where are we State 1   Where are we Country 1   Where are we [place] or [facility] 1   [place] are we Floor 1   Name three objects, then ask the patient to say them 1   Serial sevens Subtract 7 from 100 in increments 2   Ask for the three objects repeated above 2   Name a pencil 1   Name a watch 1   Have the patient repeat this phrase \"No ifs, ands, or buts\" 1   Three stage command: Take the paper in your right hand 1   Fold the paper in half 1   Put the paper on the floor 1   Read and obey the following: CLOSE YOUR EYES 1   Have the patient write a sentence 1   Have the patient copy a figure 1   Mini Mental Score 24     Able to recall events and numbers and birthdays and phone numbers. Assessment: Well Woman examination    Plan:  Differential diagnosis and treatment options reviewed with patient who is in agreement with treatment plan as outlined below. ICD-10-CM ICD-9-CM    1. Encounter for well woman exam with routine gynecological exam Z01.419 V72.31 PAP IG, HPV AND RFX HPV 89/76,07([id number])   2. Memory loss R41.3 780.93 REFERRAL TO NEUROLOGY      aspirin delayed-release 81 mg tablet   3. Hyperlipidemia, unspecified hyperlipidemia type E78.5 272.4 aspirin delayed-release 81 mg tablet   4. Need for Tdap vaccination Z23 V06.1 TETANUS, DIPHTHERIA TOXOIDS AND ACELLULAR PERTUSSIS VACCINE (TDAP), IN INDIVIDS. >=7, IM      [place] IMMUNIZ ADMIN,1 SINGLE/COMB VAC/TOXOID     Referral to Neuro for further evaluation of memory/recall problems. Will follow up after consult. Will follow up with results of pap  Discussed health maintenance screening guidelines. Needs to schedule colonoscopy. TDAP today. VIS discussed   Advised on nutrition, physical activity, weight management, tobacco, alcohol and safety  Reviewed and given written instruction, see AVS   If pap is negative, then ACOG guidelines recommend repeat pap in 3 years. ACOG GUIDELINES:  Cervical cancer screening should begin at age 24 years. Pap cytology screening is recommended every 3 years for women between the ages of 24 years and 29 years. For women aged 33-67 years, co-testing with cervical cytology screening and HPV testing is preferred and should be performed every 5 years. For women aged 33-67 years, screening with cytology alone every 3 years is acceptable.      Both liquid-based and conventional methods of Pap cytology are acceptable for screening. In women who have had a total hysterectomy and have never had CIN2 or higher, routine cytology screening and HPV testing should be discontinued and not restarted for any reason. Women who have a history of cervical cancer, have HIV infection, are immunocompromised, or were exposed to diethylstilbestrol in utero should not follow routine screening guidelines. Screening by any modality should be discontinued after age 72 years in women with evidence of adequate negative prior screening results* and no history of CIN2 or higher.       HEALTH MAINTENANCE GOALS & RECOMMENDATIONS (Included in AVS):  · Attain and/or maintain a healthy weight (BMI between 18 and 30)  · Attain and/or maintain regular physical activity for 30 minutes 5 days/week   · Eat at least 5 servings of fruits and vegetables daily, preferably fresh  · Limit fast food and prepared foods  · Attain and/or maintain healthy blood pressure   · Attain and/or maintain no nicotine/tobacco use status  · Annual flu shot (or nasal mist as appropriate)  · Stay up-to-date with immunizations including tetanus, pertussis, HPV, & pneumonia  · Annual eye exam   · Biannual dental visits  · Annual skin cancer screening  · Annual gynecologic visit for women over age 24  · Annual prostate exam for black men starting at age 36, and for other races starting at age 48 (unless indicated earlier by history)  · Colonscopy every 10 years after age 48 (unless indicated more frequently by history)  · Consider daily 81mg aspirin for men over age 39 and women over age 54  · Annual screening labs: thyroid tests (TSH and T4), complete blood count, lipid panel (cholesterol), hemoglobin A1c (diabetes screening), comprehensive metabolic panel (includes kidney function, liver function, and electrolytes), vitamin D level, urinalysis (with urine culture and microalbumin as medically indicated)  · Consider annual testing for sexually transmitted infections including HIV  · Screening bone density testing in all women over age 72    Verbal and written instructions (see AVS) provided. Patient expresses understanding and agreement of diagnosis and treatment plan.

## 2017-08-09 NOTE — PATIENT INSTRUCTIONS
Well Visit, Women 48 to 72: Care Instructions  Your Care Instructions  Physical exams can help you stay healthy. Your doctor has checked your overall health and may have suggested ways to take good care of yourself. He or she also may have recommended tests. At home, you can help prevent illness with healthy eating, regular exercise, and other steps. Follow-up care is a key part of your treatment and safety. Be sure to make and go to all appointments, and call your doctor if you are having problems. It's also a good idea to know your test results and keep a list of the medicines you take. How can you care for yourself at home? · Reach and stay at a healthy weight. This will lower your risk for many problems, such as obesity, diabetes, heart disease, and high blood pressure. · Get at least 30 minutes of exercise on most days of the week. Walking is a good choice. You also may want to do other activities, such as running, swimming, cycling, or playing tennis or team sports. · Do not smoke. Smoking can make health problems worse. If you need help quitting, talk to your doctor about stop-smoking programs and medicines. These can increase your chances of quitting for good. · Protect your skin from too much sun. When you're outdoors from 10 a.m. to 4 p.m., stay in the shade or cover up with clothing and a hat with a wide brim. Wear sunglasses that block UV rays. Even when it's cloudy, put broad-spectrum sunscreen (SPF 30 or higher) on any exposed skin. · See a dentist one or two times a year for checkups and to have your teeth cleaned. · Wear a seat belt in the car. · Limit alcohol to 1 drink a day. Too much alcohol can cause health problems. Follow your doctor's advice about when to have certain tests. These tests can spot problems early. · Cholesterol.  Your doctor will tell you how often to have this done based on your age, family history, or other things that can increase your risk for heart attack and stroke. · Blood pressure. Have your blood pressure checked during a routine doctor visit. Your doctor will tell you how often to check your blood pressure based on your age, your blood pressure results, and other factors. · Mammogram. Ask your doctor how often you should have a mammogram, which is an X-ray of your breasts. A mammogram can spot breast cancer before it can be felt and when it is easiest to treat. · Pap test and pelvic exam. Ask your doctor how often you should have a Pap test. You may not need to have a Pap test as often as you used to. · Vision. Have your eyes checked every year or two or as often as your doctor suggests. Some experts recommend that you have yearly exams for glaucoma and other age-related eye problems starting at age 48. · Hearing. Tell your doctor if you notice any change in your hearing. You can have tests to find out how well you hear. · Diabetes. Ask your doctor whether you should have tests for diabetes. · Colon cancer. You should begin tests for colon cancer at age 48. You may have one of several tests. Your doctor will tell you how often to have tests based on your age and risk. Risks include whether you already had a precancerous polyp removed from your colon or whether your parents, sisters and brothers, or children have had colon cancer. · Thyroid disease. Talk to your doctor about whether to have your thyroid checked as part of a regular physical exam. Women have an increased chance of a thyroid problem. · Osteoporosis. You should begin tests for bone density at age 72. If you are younger than 72, ask your doctor whether you have factors that may increase your risk for this disease. You may want to have this test before age 72. · Heart attack and stroke risk. At least every 4 to 6 years, you should have your risk for heart attack and stroke assessed.  Your doctor uses factors such as your age, blood pressure, cholesterol, and whether you smoke or have diabetes to show what your risk for a heart attack or stroke is over the next 10 years. When should you call for help? Watch closely for changes in your health, and be sure to contact your doctor if you have any problems or symptoms that concern you. Where can you learn more? Go to http://shelby-shanika.info/. Enter A992 in the search box to learn more about \"Well Visit, Women 50 to 72: Care Instructions. \"  Current as of: July 19, 2016  Content Version: 11.3  © 3970-1686 [employer]. Care instructions adapted under license by [employer] (which disclaims liability or warranty for this information). If you have questions about a medical condition or this instruction, always ask your healthcare professional. [address] any warranty or liability for your use of this information. Learning About High Cholesterol  What is high cholesterol? Cholesterol is a type of fat in your blood. It is needed for many body functions, such as making new cells. Cholesterol is made by your body. It also comes from food you eat. If you have too much cholesterol, it starts to build up in your arteries. This is called hardening of the arteries, or atherosclerosis. High cholesterol raises your risk of a heart attack and stroke. There are different types of cholesterol. LDL is the \"bad\" cholesterol. High LDL can raise your risk for heart disease, heart attack, and stroke. HDL is the \"good\" cholesterol. High HDL is linked with a lower risk for heart disease, heart attack, and stroke. Your cholesterol levels help your doctor find out your risk for having a heart attack or stroke. How can you prevent high cholesterol? A heart-healthy lifestyle can help you prevent high cholesterol. This lifestyle helps lower your risk for a heart attack and stroke. · Eat heart-healthy foods.   ¨ Eat fruits, vegetables, whole grains (like oatmeal), dried beans and peas, nuts and seeds, soy products (like tofu), and fat-free or low-fat dairy products. ¨ Replace butter, margarine, and hydrogenated or partially hydrogenated oils with olive and canola oils. (Canola oil margarine without trans fat is fine.)  ¨ Replace red meat with fish, poultry, and soy protein (like tofu). ¨ Limit processed and packaged foods like chips, crackers, and cookies. · Be active. Exercise can improve your cholesterol level. Get at least 30 minutes of exercise on most days of the week. Walking is a good choice. You also may want to do other activities, such as running, swimming, cycling, or playing tennis or team sports. · Stay at a healthy weight. Lose weight if you need to. · Don't smoke. If you need help quitting, talk to your doctor about stop-smoking programs and medicines. These can increase your chances of quitting for good. How is high cholesterol treated? The goal of treatment is to reduce your chances of having a heart attack or stroke. The goal is not to lower your cholesterol numbers only. · You may make lifestyle changes, such as eating healthy foods, not smoking, losing weight, and being more active. · You may have to take medicine. Follow-up care is a key part of your treatment and safety. Be sure to make and go to all appointments, and call your doctor if you are having problems. It's also a good idea to know your test results and keep a list of the medicines you take. Where can you learn more? Go to http://shelby-shanika.info/. Enter T823 in the search box to learn more about \"Learning About High Cholesterol. \"  Current as of: April 3, 2017  Content Version: 11.3  © 0453-3650 [employer]. Care instructions adapted under license by [employer] (which disclaims liability or warranty for this information).  If you have questions about a medical condition or this instruction, always ask your healthcare professional. Viola Davis disclaims any warranty or liability for your use of this information.

## 2017-08-23 ENCOUNTER — TELEPHONE (OUTPATIENT)
Dept: FAMILY MEDICINE CLINIC | Age: 62
End: 2017-08-23

## 2017-10-17 ENCOUNTER — OFFICE VISIT (OUTPATIENT)
Dept: NEUROLOGY | Age: 62
End: 2017-10-17

## 2017-10-17 VITALS
OXYGEN SATURATION: 95 % | DIASTOLIC BLOOD PRESSURE: 80 MMHG | HEART RATE: 60 BPM | BODY MASS INDEX: 21.92 KG/M2 | SYSTOLIC BLOOD PRESSURE: 132 MMHG | HEIGHT: 66 IN | WEIGHT: 136.4 LBS

## 2017-10-17 DIAGNOSIS — F03.90 DEMENTIA ARISING IN THE SENIUM AND PRESENIUM (HCC): Primary | ICD-10-CM

## 2017-10-17 PROBLEM — G30.0 EARLY ONSET ALZHEIMER'S DEMENTIA WITHOUT BEHAVIORAL DISTURBANCE (HCC): Status: ACTIVE | Noted: 2017-10-17

## 2017-10-17 PROBLEM — F02.80 EARLY ONSET ALZHEIMER'S DEMENTIA WITHOUT BEHAVIORAL DISTURBANCE (HCC): Status: ACTIVE | Noted: 2017-10-17

## 2017-10-17 RX ORDER — DONEPEZIL HYDROCHLORIDE 5 MG/1
TABLET, FILM COATED ORAL
Qty: 60 TAB | Refills: 5 | Status: SHIPPED | OUTPATIENT
Start: 2017-10-17 | End: 2018-10-25 | Stop reason: DRUGHIGH

## 2017-10-17 NOTE — LETTER
10/17/2017 1:25 PM 
 
Patient:  Christina Orozco YOB: 1955 Date of Visit: 10/17/2017 Dear Jeffrey Pedraza, YAJAIRA 
[address] [facility] [address] [facility] [address] VIA In Basket 
 : Thank you for referring Ms. Abhijeet Nash to me for evaluation/treatment. Below are the relevant portions of my assessment and plan of care. HISTORY OF PRESENT ILLNESS Christina Orozco is a 63 y.o. female. HPI Comments: Adelaide Chance is a 79-year-old right-handed -American female who comes in today for memory loss. The family feels that some of this memory loss may have been going back as far as 3 years however it is gotten considerably worse in the last 6 months. Has had a CT scan of her head done without contrast which was read as unremarkable. Patient has a history of a lumpectomy for breast cancer in 2008. 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 She says that she has gotten some comments to her at work that she is left the lights on and such but apparently there has been no significant comments about her work product. Dependent upon the patient for history and the patient's memory is very poor. The family is heard no rumbles through the community about her issues at work. She does operate a motor vehicle and has gotten lost recently. New Patient The history is provided by the patient, spouse and relative. This is a chronic problem. Review of Systems Constitutional:  
     Review of systems is positive for a recent fall in the yard with no significant injury, complete review of systems done all others negative. Current Outpatient Prescriptions on File Prior to Visit Medication Sig Dispense Refill  aspirin delayed-release 81 mg tablet Take 1 Tab by mouth daily. 30 Tab 0  pravastatin (PRAVACHOL) 10 mg tablet Take 1 Tab by mouth nightly. 30 Tab 6 No current facility-administered medications on file prior to visit. Past Medical History:  
Diagnosis Date  Breast cancer ([facility] [place] [address] 2008 Left  Cancer ([facility] [address]  Hyperlipidemia 6/30/2017  Radiation therapy complication Lt breast 2008--no chemo Family History Problem Relation Age of Onset  Lung Disease Brother  Breast Cancer Sister 43 Social History Substance Use Topics  Smoking status: Never Smoker  Smokeless tobacco: Never Used  Alcohol use No  
 
Patient Active Problem List  
Diagnosis Code  History of breast cancer Z85.3  Hyperlipidemia E78.5  Early onset Alzheimer's dementia without behavioral disturbance G30.0, F02.80 /80  Pulse 60  Ht 5' 6\" (1.676 m)  Wt 136 lb 6.4 oz (61.9 kg)  SpO2 95%  BMI 22.02 kg/m2 CT Results (most recent): 
 
Results from Hospital Encounter encounter on 08/07/17 CT HEAD WO CONT Narrative EXAM:  CT HEAD WO CONT INDICATION:   memory loss, history of breast cancer COMPARISON: None. CONTRAST:  None. TECHNIQUE: Unenhanced CT of the head was performed using 5 mm images. Brain and 
bone windows were generated. CT dose reduction was achieved through use of a 
standardized protocol tailored for this examination and automatic exposure 
control for dose modulation. FINDINGS: 
The ventricles and sulci are normal in size, shape and configuration and 
midline. There is no significant white matter disease. There is no intracranial 
hemorrhage, extra-axial collection, mass, mass effect or midline shift. The 
basilar cisterns are open. No acute infarct is identified. The bone windows 
demonstrate no abnormalities. The visualized portions of the paranasal sinuses 
and mastoid air cells are clear. Impression IMPRESSION: Normal 
 
   
 
 
 
Physical Exam  
Constitutional: She appears well-developed and well-nourished. No distress. HENT:  
Head: Normocephalic and atraumatic. Mouth/Throat: Oropharynx is clear and moist. No oropharyngeal exudate. Eyes: Conjunctivae and EOM are normal. Pupils are equal, round, and reactive to light. No scleral icterus. Saccades are a little jerky and not smooth. Neck: Neck supple. No thyromegaly present. Cardiovascular: Normal rate, regular rhythm and normal heart sounds. No murmur heard. Musculoskeletal: Normal range of motion. She exhibits no edema, tenderness or deformity. Lymphadenopathy:  
  She has no cervical adenopathy. Skin: Skin is warm and dry. No rash noted. She is not diaphoretic. No erythema. Psychiatric: She has a normal mood and affect. Her behavior is normal. Judgment and thought content normal.  
MME in office normal score of  
  
Mini Mental State Exam 10/17/17 What is the Year 1 What is the Season 1 What is the Date 0 What is the Day 0 What is the Month 1 Where are we State 1 Where are we Country 1 Where are we [place] or [facility] 1 [place] are we Floor 1 Name three objects, then ask the patient to say them 3 Serial sevens Subtract 7 from 100 in increments 0 Ask for the three objects repeated above 0 Name a pencil 1 Name a watch 1 Have the patient repeat this phrase \"No ifs, ands, or buts\" 0 Three stage command: Take the paper in your right hand 1 Fold the paper in half 1 Put the paper on the floor 0 Have the patient copy a figure 0 Mini Mental Score 14 ASSESSMENT and PLAN 
DEMENTIA Patient has an early fairly rapidly progressive dementia. I think there is little chance that this represents a pseudodementia of depression. Patient does not appear depressed and the family does not describe any symptoms of depression. With a MMSE score of 14 and the hyperreflexia and ankle clonus ,4-5 beats bilaterally I unfortunately think this is a rapidly progressing dementia either Alzheimer type or frontotemporal dementia. She has no significant family history of dementia.   I am going to set her up for an MRI scan of her brain with contrast and EEG and B12 level, ESR and MALOU. She is already had a normal TSH and T4. Mention the [facility] PET scan trial to them but recommended we wait until she is on medication and steady. I will call them with results when they come in, plan to see her back in 3 months. I told them it would be best if she did not drive, not sure how closely they are going to follow that however the whole family got the general drift of the conversation. This note will not be viewable in [address] If you have questions, please do not hesitate to call me. I look forward to following Ms. Bartolome Chacon along with you. Sincerely, Johan Doshi MD

## 2017-10-17 NOTE — LETTER
NOTIFICATION RETURN TO WORK / SCHOOL 
 
10/17/2017 9:04 AM 
 
Ms. Liz Eisenberg [address] [id number] To Whom It May Concern:  
 
Liz Eisenberg is currently under the care of the [address] [place] [address]. She is under the care of Dr. Brayan Ngo She will return to work on 10/18/17 If there are questions or concerns please contact our office. Sincerely, Kentrell Avila MD

## 2017-10-17 NOTE — LETTER
PATIENT:   Edita Parrish YOB: 1955 DATE OF VISIT: 10/17/2017 Dear No ref. provider found Thank you for referring Zelda Dickson to me for evaluation/treatment. Below are the relevant portions of my assessment and plan of care. HISTORY OF PRESENT ILLNESS Edita Parrish is a 63 y.o. female. HPI Comments: Chelsy Denny is a 32-year-old right-handed -American female who comes in today for memory loss. The family feels that some of this memory loss may have been going back as far as 3 years however it is gotten considerably worse in the last 6 months. Has had a CT scan of her head done without contrast which was read as unremarkable. Patient has a history of a lumpectomy for breast cancer in 2008. 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 She says that she has gotten some comments to her at work that she is left the lights on and such but apparently there has been no significant comments about her work product. Dependent upon the patient for history and the patient's memory is very poor. The family is heard no rumbles through the community about her issues at work. She does operate a motor vehicle and has gotten lost recently. New Patient The history is provided by the patient, spouse and relative. This is a chronic problem. Review of Systems Constitutional:  
     Review of systems is positive for a recent fall in the yard with no significant injury, complete review of systems done all others negative. Current Outpatient Prescriptions on File Prior to Visit Medication Sig Dispense Refill  aspirin delayed-release 81 mg tablet Take 1 Tab by mouth daily. 30 Tab 0  pravastatin (PRAVACHOL) 10 mg tablet Take 1 Tab by mouth nightly. 30 Tab 6 No current facility-administered medications on file prior to visit. Past Medical History: Diagnosis Date  Breast cancer ([facility] [address] 2008 Left  Cancer ([facility] [address]  Hyperlipidemia 6/30/2017  Radiation therapy complication Lt breast 2008--no chemo Family History Problem Relation Age of Onset  Lung Disease Brother  Breast Cancer Sister 43 Social History Substance Use Topics  Smoking status: Never Smoker  Smokeless tobacco: Never Used  Alcohol use No  
 
Patient Active Problem List  
Diagnosis Code  History of breast cancer Z85.3  Hyperlipidemia E78.5  Early onset Alzheimer's dementia without behavioral disturbance G30.0, F02.80 /80  Pulse 60  Ht 5' 6\" (1.676 m)  Wt 136 lb 6.4 oz (61.9 kg)  SpO2 95%  BMI 22.02 kg/m2 CT Results (most recent): 
 
Results from Hospital Encounter encounter on 08/07/17 CT HEAD WO CONT Narrative EXAM:  CT HEAD WO CONT INDICATION:   memory loss, history of breast cancer COMPARISON: None. CONTRAST:  None. TECHNIQUE: Unenhanced CT of the head was performed using 5 mm images. Brain and 
bone windows were generated. CT dose reduction was achieved through use of a 
standardized protocol tailored for this examination and automatic exposure 
control for dose modulation. FINDINGS: 
The ventricles and sulci are normal in size, shape and configuration and 
midline. There is no significant white matter disease. There is no intracranial 
hemorrhage, extra-axial collection, mass, mass effect or midline shift. The 
basilar cisterns are open. No acute infarct is identified. The bone windows 
demonstrate no abnormalities. The visualized portions of the paranasal sinuses 
and mastoid air cells are clear. Impression IMPRESSION: Normal 
 
   
 
 
 
Physical Exam  
Constitutional: She appears well-developed and well-nourished. No distress. HENT:  
Head: Normocephalic and atraumatic. Mouth/Throat: Oropharynx is clear and moist. No oropharyngeal exudate. Eyes: Conjunctivae and EOM are normal. Pupils are equal, round, and reactive to light. No scleral icterus. Saccades are a little jerky and not smooth. Neck: Neck supple. No thyromegaly present. Cardiovascular: Normal rate, regular rhythm and normal heart sounds. No murmur heard. Musculoskeletal: Normal range of motion. She exhibits no edema, tenderness or deformity. Lymphadenopathy:  
  She has no cervical adenopathy. Skin: Skin is warm and dry. No rash noted. She is not diaphoretic. No erythema. Psychiatric: She has a normal mood and affect. Her behavior is normal. Judgment and thought content normal.  
MME in office normal score of  
  
Mini Mental State Exam 10/17/17 What is the Year 1 What is the Season 1 What is the Date 0 What is the Day 0 What is the Month 1 Where are we State 1 Where are we Country 1 Where are we [place] [facility] or [facility] 1 [facility] are we Floor 1 Name three objects, then ask the patient to say them 3 Serial sevens Subtract 7 from 100 in increments 0 Ask for the three objects repeated above 0 Name a pencil 1 Name a watch 1 Have the patient repeat this phrase \"No ifs, ands, or buts\" 0 Three stage command: Take the paper in your right hand 1 Fold the paper in half 1 Put the paper on the floor 0 Have the patient copy a figure 0 Mini Mental Score 14 ASSESSMENT and PLAN 
DEMENTIA Patient has an early fairly rapidly progressive dementia. I think there is little chance that this represents a pseudodementia of depression. Patient does not appear depressed and the family does not describe any symptoms of depression. With a MMSE score of 14 and the hyperreflexia and ankle clonus ,4-5 beats bilaterally I unfortunately think this is a rapidly progressing dementia either Alzheimer type or frontotemporal dementia. She has no significant family history of dementia.   I am going to set her up for an MRI scan of her brain with contrast and EEG and B12 level, ESR and MALOU. She is already had a normal TSH and T4. Mention the [facility] PET scan trial to them but recommended we wait until she is on medication and steady. I will call them with results when they come in, plan to see her back in 3 months. I told them it would be best if she did not drive, not sure how closely they are going to follow that however the whole family got the general drift of the conversation. This note will not be viewable in [address] If you have questions, please do not hesitate to call me. Sincerely, Roderic Dakins, MD 
 
Cc:  No Recipients

## 2017-10-17 NOTE — PROGRESS NOTES
HISTORY OF PRESENT ILLNESS  Shaji Ortiz is a 63 y.o. female. HPI Comments: Chon Nolen is a 62-year-old right-handed -American female who comes in today for memory loss. The family feels that some of this memory loss may have been going back as far as 3 years however it is gotten considerably worse in the last 6 months. Has had a CT scan of her head done without contrast which was read as unremarkable. Patient has a history of a lumpectomy for breast cancer in 2008. 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 She says that she has gotten some comments to her at work that she is left the lights on and such but apparently there has been no significant comments about her work product. Dependent upon the patient for history and the patient's memory is very poor. The family is heard no rumbles through the community about her issues at work. She does operate a motor vehicle and has gotten lost recently. New Patient   The history is provided by the patient, spouse and relative. This is a chronic problem. Review of Systems   Constitutional:        Review of systems is positive for a recent fall in the yard with no significant injury, complete review of systems done all others negative. Current Outpatient Prescriptions on File Prior to Visit   Medication Sig Dispense Refill    aspirin delayed-release 81 mg tablet Take 1 Tab by mouth daily. 30 Tab 0    pravastatin (PRAVACHOL) 10 mg tablet Take 1 Tab by mouth nightly. 30 Tab 6     No current facility-administered medications on file prior to visit.       Past Medical History:   Diagnosis Date    Breast cancer ([facility] [address] 2008    Left    Cancer [facility])     Hyperlipidemia 6/30/2017    Radiation therapy complication     Lt breast 2008--no chemo     Family History   Problem Relation Age of Onset    Lung Disease Brother     Breast Cancer Sister      43 Social History   Substance Use Topics    Smoking status: Never Smoker    Smokeless tobacco: Never Used    Alcohol use No     Patient Active Problem List   Diagnosis Code    History of breast cancer Z85.3    Hyperlipidemia E78.5    Early onset Alzheimer's dementia without behavioral disturbance G30.0, F02.80     /80  Pulse 60  Ht 5' 6\" (1.676 m)  Wt 136 lb 6.4 oz (61.9 kg)  SpO2 95%  BMI 22.02 kg/m2    CT Results (most recent):    Results from Hospital Encounter encounter on 08/07/17   CT HEAD WO CONT   Narrative EXAM:  CT HEAD WO CONT    INDICATION:   memory loss, history of breast cancer    COMPARISON: None. CONTRAST:  None. TECHNIQUE: Unenhanced CT of the head was performed using 5 mm images. Brain and  bone windows were generated. CT dose reduction was achieved through use of a  standardized protocol tailored for this examination and automatic exposure  control for dose modulation. FINDINGS:  The ventricles and sulci are normal in size, shape and configuration and  midline. There is no significant white matter disease. There is no intracranial  hemorrhage, extra-axial collection, mass, mass effect or midline shift. The  basilar cisterns are open. No acute infarct is identified. The bone windows  demonstrate no abnormalities. The visualized portions of the paranasal sinuses  and mastoid air cells are clear. Impression IMPRESSION: Normal              Physical Exam   Constitutional: She appears well-developed and well-nourished. No distress. HENT:   Head: Normocephalic and atraumatic. Mouth/Throat: Oropharynx is clear and moist. No oropharyngeal exudate. Eyes: Conjunctivae and EOM are normal. Pupils are equal, round, and reactive to light. No scleral icterus. Saccades are a little jerky and not smooth. Neck: Neck supple. No thyromegaly present. Cardiovascular: Normal rate, regular rhythm and normal heart sounds. No murmur heard.   Musculoskeletal: Normal range of motion. She exhibits no edema, tenderness or deformity. Lymphadenopathy:     She has no cervical adenopathy. Skin: Skin is warm and dry. No rash noted. She is not diaphoretic. No erythema. Psychiatric: She has a normal mood and affect. Her behavior is normal. Judgment and thought content normal.   MME in office normal score of      Mini Mental State Exam 10/17/17   What is the Year 1   What is the Season 1   What is the Date 0   What is the Day 0   What is the Month 1   Where are we State 1   Where are we Country 1   Where are we [place] or [facility] 1   [place] are we Floor 1   Name three objects, then ask the patient to say them 3   Serial sevens Subtract 7 from 100 in increments 0   Ask for the three objects repeated above 0   Name a pencil 1   Name a watch 1   Have the patient repeat this phrase \"No ifs, ands, or buts\" 0   Three stage command: Take the paper in your right hand 1   Fold the paper in half 1   Put the paper on the floor 0   Have the patient copy a figure 0   Mini Mental Score 14           ASSESSMENT and PLAN  DEMENTIA  Patient has an early fairly rapidly progressive dementia. I think there is little chance that this represents a pseudodementia of depression. Patient does not appear depressed and the family does not describe any symptoms of depression. With a MMSE score of 14 and the hyperreflexia and ankle clonus ,4-5 beats bilaterally I unfortunately think this is a rapidly progressing dementia either Alzheimer type or frontotemporal dementia. She has no significant family history of dementia. I am going to set her up for an MRI scan of her brain with contrast and EEG and B12 level, ESR and MALOU. She is already had a normal TSH and T4. Mention the [facility] PET scan trial to them but recommended we wait until she is on medication and steady. I will call them with results when they come in, plan to see her back in 3 months.   I told them it would be best if she did not drive, not sure how closely they are going to follow that however the whole family got the general drift of the conversation. This note will not be viewable in [address]

## 2017-10-17 NOTE — MR AVS SNAPSHOT
Visit Information Date & Time Provider Department Dept. Phone Encounter #  
 10/17/2017  8:00 AM Johan Doshi MD [facility] [phone] [id number] Your Appointments 2/13/2018  8:40 AM  
Follow Up with Johan Doshi MD  
Neurology [facility] at [facility]) Appt Note: follow up $ 0 CP jll 10/17/17  
 [facility], 
[address] [place] [address], [address] [address]  
[address] [facility], [address] [place] [address], [address] [address] Upcoming Health Maintenance Date Due COLONOSCOPY 6/6/2016 ZOSTER VACCINE AGE 60> 10/3/2017 BREAST CANCER SCRN MAMMOGRAM 4/4/2018 PAP AKA CERVICAL CYTOLOGY 8/9/2020 DTaP/Tdap/Td series (2 - Td) 8/9/2027 Allergies as of 10/17/2017  Review Complete On: 10/17/2017 By: Charlotte Siemens Severity Noted Reaction Type Reactions Pcn [Penicillins]  09/28/2010    Hives Current Immunizations  Reviewed on 8/9/2017 Name Date Influenza Vaccine Split 1/19/2012 Tdap 8/9/2017 Not reviewed this visit You Were Diagnosed With   
  
 Codes Comments Dementia arising in the senium and presenium    -  Primary ICD-10-CM: F03.90 ICD-9-CM: 294.8 Vitals BP Pulse Height(growth percentile) Weight(growth percentile) SpO2 BMI  
 132/80 60 5' 6\" (1.676 m) 136 lb 6.4 oz (61.9 kg) 95% 22.02 kg/m2 OB Status Smoking Status Postmenopausal Never Smoker BMI and BSA Data Body Mass Index Body Surface Area 22.02 kg/m 2 1.7 m 2 Preferred Pharmacy Pharmacy Name Phone [facility] [address] [facility],  [facility] [address] Your Updated Medication List  
  
   
This list is accurate as of: 10/17/17  8:57 AM.  Always use your most recent med list.  
  
  
  
  
 aspirin delayed-release 81 mg tablet Take 1 Tab by mouth daily. donepezil 5 mg tablet Commonly known as:  ARICEPT Take 1 tablet a day for 30 days then take 2 tablets a day. Indications: MILD TO MODERATE ALZHEIMER'S TYPE DEMENTIA  
  
 pravastatin 10 mg tablet Commonly known as:  PRAVACHOL Take 1 Tab by mouth nightly. Prescriptions Sent to Pharmacy Refills  
 donepezil (ARICEPT) 5 mg tablet 5 Sig: Take 1 tablet a day for 30 days then take 2 tablets a day. Indications: MILD TO MODERATE ALZHEIMER'S TYPE DEMENTIA Class: Normal  
 Pharmacy: [id number] [place] Ph #: [phone] We Performed the Following MALOU COMPREHENSIVE PANEL [CXQ00209 Custom] RPR [59658 CPT(R)] SED RATE (ESR) F3925408 CPT(R)] VITAMIN B12 & FOLATE [83894 CPT(R)] To-Do List   
 10/18/2017 Neurology:  EEG   
  
 10/24/2017 Imaging:  MRI BRAIN W WO CONT Patient Instructions A Healthy Lifestyle: Care Instructions Your Care Instructions A healthy lifestyle can help you feel good, stay at a healthy weight, and have plenty of energy for both work and play. A healthy lifestyle is something you can share with your whole family. A healthy lifestyle also can lower your risk for serious health problems, such as high blood pressure, heart disease, and diabetes. You can follow a few steps listed below to improve your health and the health of your family. Follow-up care is a key part of your treatment and safety. Be sure to make and go to all appointments, and call your doctor if you are having problems. Its also a good idea to know your test results and keep a list of the medicines you take. How can you care for yourself at home? · Do not eat too much sugar, fat, or fast foods. You can still have dessert and treats now and then. The goal is moderation. · Start small to improve your eating habits.  Pay attention to portion sizes, drink less juice and soda pop, and eat more fruits and vegetables. ¨ Eat a healthy amount of food. A 3-ounce serving of meat, for example, is about the size of a deck of cards. Fill the rest of your plate with vegetables and whole grains. ¨ Limit the amount of soda and sports drinks you have every day. Drink more water when you are thirsty. ¨ Eat at least 5 servings of fruits and vegetables every day. It may seem like a lot, but it is not hard to reach this goal. A serving or helping is 1 piece of fruit, 1 cup of vegetables, or 2 cups of leafy, raw vegetables. Have an apple or some carrot sticks as an afternoon snack instead of a candy bar. Try to have fruits and/or vegetables at every meal. 
· Make exercise part of your daily routine. You may want to start with simple activities, such as walking, bicycling, or slow swimming. Try to be active 30 to 60 minutes every day. You do not need to do all 30 to 60 minutes all at once. For example, you can exercise 3 times a day for 10 or 20 minutes. Moderate exercise is safe for most people, but it is always a good idea to talk to your doctor before starting an exercise program. 
· Keep moving. Analilia Anu the lawn, work in the garden, or [employer]. Take the stairs instead of the elevator at work. · If you smoke, quit. People who smoke have an increased risk for heart attack, stroke, cancer, and other lung illnesses. Quitting is hard, but there are ways to boost your chance of quitting tobacco for good. ¨ Use nicotine gum, patches, or lozenges. ¨ Ask your doctor about stop-smoking programs and medicines. ¨ Keep trying. In addition to reducing your risk of diseases in the future, you will notice some benefits soon after you stop using tobacco. If you have shortness of breath or asthma symptoms, they will likely get better within a few weeks after you quit. · Limit how much alcohol you drink.  Moderate amounts of alcohol (up to 2 drinks a day for men, 1 drink a day for women) are okay. But drinking too much can lead to liver problems, high blood pressure, and other health problems. Family health If you have a family, there are many things you can do together to improve your health. · Eat meals together as a family as often as possible. · Eat healthy foods. This includes fruits, vegetables, lean meats and dairy, and whole grains. · Include your family in your fitness plan. Most people think of activities such as jogging or tennis as the way to fitness, but there are many ways you and your family can be more active. Anything that makes you breathe hard and gets your heart pumping is exercise. Here are some tips: 
¨ Walk to do errands or to take your child to school or the bus. ¨ Go for a family bike ride after dinner instead of watching TV. Where can you learn more? Go to http://shelby-shanika.info/. Enter L153 in the search box to learn more about \"A Healthy Lifestyle: Care Instructions. \" Current as of: July 26, 2016 Content Version: 11.3 © 9655-4019 [employer]. Care instructions adapted under license by [employer] (which disclaims liability or warranty for this information). If you have questions about a medical condition or this instruction, always ask your healthcare professional. [address] any warranty or liability for your use of this information. Introducing [facility]! [facility] introduces AppDirect patient portal. Now you can access parts of your medical record, email your doctor's office, and request medication refills online. 1. In your internet browser, go to [email or web address] 2. Click on the First Time User? Click Here link in the Sign In box. You will see the New Member Sign Up page. 3. Enter your AppDirect Access Code exactly as it appears below.  You will not need to use this code after youve completed the sign-up process. If you do not sign up before the expiration date, you must request a new code. · [employer] Access Code: [email or web address] Expires: 1/15/2018  7:48 AM 
 
4. Enter the last four digits of your Social Security Number (xxxx) and Date of Birth (mm/dd/yyyy) as indicated and click Submit. You will be taken to the next sign-up page. 5. Create a [employer] ID. This will be your [employer] login ID and cannot be changed, so think of one that is secure and easy to remember. 6. Create a [employer] password. You can change your password at any time. 7. Enter your Password Reset Question and Answer. This can be used at a later time if you forget your password. 8. Enter your e-mail address. You will receive e-mail notification when new information is available in [address]. 9. Click Sign Up. You can now view and download portions of your medical record. 10. Click the Download Summary menu link to download a portable copy of your medical information. If you have questions, please visit the Frequently Asked Questions section of the [employer] website. Remember, [employer] is NOT to be used for urgent needs. For medical emergencies, dial 911. Now available from your iPhone and Android! Please provide this summary of care documentation to your next provider. Your primary care clinician is listed as NAYAN BRADY. If you have any questions after today's visit, please call [phone].

## 2017-10-17 NOTE — LETTER
10/17/2017 1:21 PM 
 
Patient:  Jewel Vines YOB: 1955 Date of Visit: 10/17/2017 Dear Karen Shine, YAJAIRA 
[address] [facility] [address] [facility] [address] VIA In Basket 
 : Thank you for referring Ms. Keshia Chirinos to me for evaluation/treatment. Below are the relevant portions of my assessment and plan of care. HISTORY OF PRESENT ILLNESS Jewel Vines is a 63 y.o. female. HPI Comments: Iris Cano is a 73-year-old right-handed -American female who comes in today for memory loss. The family feels that some of this memory loss may have been going back as far as 3 years however it is gotten considerably worse in the last 6 months. Has had a CT scan of her head done without contrast which was read as unremarkable. Patient has a history of a lumpectomy for breast cancer in 2008. 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 She says that she has gotten some comments to her at work that she is left the lights on and such but apparently there has been no significant comments about her work product. Dependent upon the patient for history and the patient's memory is very poor. The family is heard no rumbles through the community about her issues at work. She does operate a motor vehicle and has gotten lost recently. New Patient The history is provided by the patient, spouse and relative. This is a chronic problem. Review of Systems Constitutional:  
     Review of systems is positive for a recent fall in the yard with no significant injury, complete review of systems done all others negative. Current Outpatient Prescriptions on File Prior to Visit Medication Sig Dispense Refill  aspirin delayed-release 81 mg tablet Take 1 Tab by mouth daily. 30 Tab 0  pravastatin (PRAVACHOL) 10 mg tablet Take 1 Tab by mouth nightly. 30 Tab 6 No current facility-administered medications on file prior to visit. Past Medical History:  
Diagnosis Date  Breast cancer ([facility] [address] 2008 Left  Cancer ([facility] [address]  Hyperlipidemia 6/30/2017  Radiation therapy complication Lt breast 2008--no chemo Family History Problem Relation Age of Onset  Lung Disease Brother  Breast Cancer Sister 43 Social History Substance Use Topics  Smoking status: Never Smoker  Smokeless tobacco: Never Used  Alcohol use No  
 
Patient Active Problem List  
Diagnosis Code  History of breast cancer Z85.3  Hyperlipidemia E78.5  Early onset Alzheimer's dementia without behavioral disturbance G30.0, F02.80 /80  Pulse 60  Ht 5' 6\" (1.676 m)  Wt 136 lb 6.4 oz (61.9 kg)  SpO2 95%  BMI 22.02 kg/m2 CT Results (most recent): 
 
Results from Hospital Encounter encounter on 08/07/17 CT HEAD WO CONT Narrative EXAM:  CT HEAD WO CONT INDICATION:   memory loss, history of breast cancer COMPARISON: None. CONTRAST:  None. TECHNIQUE: Unenhanced CT of the head was performed using 5 mm images. Brain and 
bone windows were generated. CT dose reduction was achieved through use of a 
standardized protocol tailored for this examination and automatic exposure 
control for dose modulation. FINDINGS: 
The ventricles and sulci are normal in size, shape and configuration and 
midline. There is no significant white matter disease. There is no intracranial 
hemorrhage, extra-axial collection, mass, mass effect or midline shift. The 
basilar cisterns are open. No acute infarct is identified. The bone windows 
demonstrate no abnormalities. The visualized portions of the paranasal sinuses 
and mastoid air cells are clear. Impression IMPRESSION: Normal 
 
   
 
 
 
Physical Exam  
Constitutional: She appears well-developed and well-nourished. No distress. HENT:  
Head: Normocephalic and atraumatic. Mouth/Throat: Oropharynx is clear and moist. No oropharyngeal exudate. Eyes: Conjunctivae and EOM are normal. Pupils are equal, round, and reactive to light. No scleral icterus. Saccades are a little jerky and not smooth. Neck: Neck supple. No thyromegaly present. Cardiovascular: Normal rate, regular rhythm and normal heart sounds. No murmur heard. Musculoskeletal: Normal range of motion. She exhibits no edema, tenderness or deformity. Lymphadenopathy:  
  She has no cervical adenopathy. Skin: Skin is warm and dry. No rash noted. She is not diaphoretic. No erythema. Psychiatric: She has a normal mood and affect. Her behavior is normal. Judgment and thought content normal.  
MME in office normal score of  
  
Mini Mental State Exam 10/17/17 What is the Year 1 What is the Season 1 What is the Date 0 What is the Day 0 What is the Month 1 Where are we State 1 Where are we Country 1 Where are we [place] or [facility] 1 Whree are we Floor 1 Name three objects, then ask the patient to say them 3 Serial sevens Subtract 7 from 100 in increments 0 Ask for the three objects repeated above 0 Name a pencil 1 Name a watch 1 Have the patient repeat this phrase \"No ifs, ands, or buts\" 0 Three stage command: Take the paper in your right hand 1 Fold the paper in half 1 Put the paper on the floor 0 Have the patient copy a figure 0 Mini Mental Score 14 ASSESSMENT and PLAN 
DEMENTIA Patient has an early fairly rapidly progressive dementia. I think there is little chance that this represents a pseudodementia of depression. Patient does not appear depressed and the family does not describe any symptoms of depression. With a MMSE score of 14 and the hyperreflexia and ankle clonus ,4-5 beats bilaterally I unfortunately think this is a rapidly progressing dementia either Alzheimer type or frontotemporal dementia. She has no significant family history of dementia.   I am going to set her up for an MRI scan of her brain with contrast and EEG and B12 level, ESR and MALOU. She is already had a normal TSH and T4. Mention the [facility] PET scan trial to them but recommended we wait until she is on medication and steady. I will call them with results when they come in, plan to see her back in 3 months. I told them it would be best if she did not drive, not sure how closely they are going to follow that however the whole family got the general drift of the conversation. This note will not be viewable in [address] If you have questions, please do not hesitate to call me. I look forward to following Ms. Anai Olsen along with you. Sincerely, Elo Saavedra MD

## 2017-10-17 NOTE — LETTER
11/9/2017 1:03 PM 
 
Patient:  Светлана Andrade YOB: 1955 Date of Visit: 10/17/2017 Dear No Recipients: Thank you for referring Ms. Ashely Peter to me for evaluation/treatment. Below are the relevant portions of my assessment and plan of care. HISTORY OF PRESENT ILLNESS Светлана Andrade is a 63 y.o. female. HPI Comments: Darien Terry is a 74-year-old right-handed -American female who comes in today for memory loss. The family feels that some of this memory loss may have been going back as far as 3 years however it is gotten considerably worse in the last 6 months. Has had a CT scan of her head done without contrast which was read as unremarkable. Patient has a history of a lumpectomy for breast cancer in 2008. 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 She says that she has gotten some comments to her at work that she is left the lights on and such but apparently there has been no significant comments about her work product. Dependent upon the patient for history and the patient's memory is very poor. The family is heard no rumbles through the community about her issues at work. She does operate a motor vehicle and has gotten lost recently. New Patient The history is provided by the patient, spouse and relative. This is a chronic problem. Review of Systems Constitutional:  
     Review of systems is positive for a recent fall in the yard with no significant injury, complete review of systems done all others negative. Current Outpatient Prescriptions on File Prior to Visit Medication Sig Dispense Refill  aspirin delayed-release 81 mg tablet Take 1 Tab by mouth daily. 30 Tab 0  pravastatin (PRAVACHOL) 10 mg tablet Take 1 Tab by mouth nightly. 30 Tab 6 No current facility-administered medications on file prior to visit. Past Medical History: Diagnosis Date  Breast cancer ([facility] [address] 2008 Left  Cancer ([facility] [address]  Hyperlipidemia 6/30/2017  Radiation therapy complication Lt breast 2008--no chemo Family History Problem Relation Age of Onset  Lung Disease Brother  Breast Cancer Sister 43 Social History Substance Use Topics  Smoking status: Never Smoker  Smokeless tobacco: Never Used  Alcohol use No  
 
Patient Active Problem List  
Diagnosis Code  History of breast cancer Z85.3  Hyperlipidemia E78.5  Early onset Alzheimer's dementia without behavioral disturbance G30.0, F02.80 /80  Pulse 60  Ht 5' 6\" (1.676 m)  Wt 136 lb 6.4 oz (61.9 kg)  SpO2 95%  BMI 22.02 kg/m2 CT Results (most recent): 
 
Results from Hospital Encounter encounter on 08/07/17 CT HEAD WO CONT Narrative EXAM:  CT HEAD WO CONT INDICATION:   memory loss, history of breast cancer COMPARISON: None. CONTRAST:  None. TECHNIQUE: Unenhanced CT of the head was performed using 5 mm images. Brain and 
bone windows were generated. CT dose reduction was achieved through use of a 
standardized protocol tailored for this examination and automatic exposure 
control for dose modulation. FINDINGS: 
The ventricles and sulci are normal in size, shape and configuration and 
midline. There is no significant white matter disease. There is no intracranial 
hemorrhage, extra-axial collection, mass, mass effect or midline shift. The 
basilar cisterns are open. No acute infarct is identified. The bone windows 
demonstrate no abnormalities. The visualized portions of the paranasal sinuses 
and mastoid air cells are clear. Impression IMPRESSION: Normal 
 
   
 
 
 
Physical Exam  
Constitutional: She appears well-developed and well-nourished. No distress. HENT:  
Head: Normocephalic and atraumatic. Mouth/Throat: Oropharynx is clear and moist. No oropharyngeal exudate. Eyes: Conjunctivae and EOM are normal. Pupils are equal, round, and reactive to light. No scleral icterus. Saccades are a little jerky and not smooth. Neck: Neck supple. No thyromegaly present. Cardiovascular: Normal rate, regular rhythm and normal heart sounds. No murmur heard. Musculoskeletal: Normal range of motion. She exhibits no edema, tenderness or deformity. Lymphadenopathy:  
  She has no cervical adenopathy. Skin: Skin is warm and dry. No rash noted. She is not diaphoretic. No erythema. Psychiatric: She has a normal mood and affect. Her behavior is normal. Judgment and thought content normal.  
MME in office normal score of  
  
Mini Mental State Exam 10/17/17 What is the Year 1 What is the Season 1 What is the Date 0 What is the Day 0 What is the Month 1 Where are we State 1 Where are we Country 1 Where are we [place] or [facility] 1 Whree are we Floor 1 Name three objects, then ask the patient to say them 3 Serial sevens Subtract 7 from 100 in increments 0 Ask for the three objects repeated above 0 Name a pencil 1 Name a watch 1 Have the patient repeat this phrase \"No ifs, ands, or buts\" 0 Three stage command: Take the paper in your right hand 1 Fold the paper in half 1 Put the paper on the floor 0 Have the patient copy a figure 0 Mini Mental Score 14 ASSESSMENT and PLAN 
DEMENTIA Patient has an early fairly rapidly progressive dementia. I think there is little chance that this represents a pseudodementia of depression. Patient does not appear depressed and the family does not describe any symptoms of depression. With a MMSE score of 14 and the hyperreflexia and ankle clonus ,4-5 beats bilaterally I unfortunately think this is a rapidly progressing dementia either Alzheimer type or frontotemporal dementia. She has no significant family history of dementia.   I am going to set her up for an MRI scan of her brain with contrast and EEG and B12 level, ESR and MALOU. She is already had a normal TSH and T4. Mention the [facility] PET scan trial to them but recommended we wait until she is on medication and steady. I will call them with results when they come in, plan to see her back in 3 months. I told them it would be best if she did not drive, not sure how closely they are going to follow that however the whole family got the general drift of the conversation. This note will not be viewable in [address] If you have questions, please do not hesitate to call me. I look forward to following Ms. Milagros Weinberg along with you. Sincerely, Laura Cameron MD

## 2017-10-17 NOTE — PATIENT INSTRUCTIONS

## 2017-10-18 LAB
CENTROMERE B AB SER-ACNC: <0.2 AI (ref 0–0.9)
CHROMATIN AB SERPL-ACNC: <0.2 AI (ref 0–0.9)
DSDNA AB SER-ACNC: <1 IU/ML (ref 0–9)
ENA JO1 AB SER-ACNC: <0.2 AI (ref 0–0.9)
ENA RNP AB SER-ACNC: <0.2 AI (ref 0–0.9)
ENA SCL70 AB SER-ACNC: <0.2 AI (ref 0–0.9)
ENA SM AB SER-ACNC: <0.2 AI (ref 0–0.9)
ENA SS-A AB SER-ACNC: <0.2 AI (ref 0–0.9)
ENA SS-B AB SER-ACNC: <0.2 AI (ref 0–0.9)
ERYTHROCYTE [SEDIMENTATION RATE] IN BLOOD BY WESTERGREN METHOD: 2 MM/HR (ref 0–40)
FOLATE SERPL-MCNC: 11.9 NG/ML
RPR SER QL: NON REACTIVE
SEE BELOW, 164869: NORMAL
VIT B12 SERPL-MCNC: 787 PG/ML (ref 211–946)

## 2017-11-08 ENCOUNTER — HOSPITAL ENCOUNTER (OUTPATIENT)
Dept: NEUROLOGY | Age: 62
Discharge: HOME | End: 2017-11-08
Attending: PSYCHIATRY & NEUROLOGY
Payer: COMMERCIAL

## 2017-11-08 ENCOUNTER — HOSPITAL ENCOUNTER (OUTPATIENT)
Dept: MRI IMAGING | Age: 62
Discharge: HOME | End: 2017-11-08
Attending: PSYCHIATRY & NEUROLOGY
Payer: COMMERCIAL

## 2017-11-08 DIAGNOSIS — F03.90 DEMENTIA ARISING IN THE SENIUM AND PRESENIUM (HCC): ICD-10-CM

## 2017-11-08 DIAGNOSIS — G30.0 EARLY ONSET ALZHEIMER'S DEMENTIA WITHOUT BEHAVIORAL DISTURBANCE (HCC): ICD-10-CM

## 2017-11-08 DIAGNOSIS — F02.80 EARLY ONSET ALZHEIMER'S DEMENTIA WITHOUT BEHAVIORAL DISTURBANCE (HCC): ICD-10-CM

## 2017-11-08 DIAGNOSIS — R41.82 ALTERED MENTAL STATUS, UNSPECIFIED ALTERED MENTAL STATUS TYPE: ICD-10-CM

## 2017-11-08 PROCEDURE — A9576 INJ PROHANCE MULTIPACK: HCPCS | Performed by: PSYCHIATRY & NEUROLOGY

## 2017-11-08 PROCEDURE — 70553 MRI BRAIN STEM W/O & W/DYE: CPT

## 2017-11-08 PROCEDURE — 74011250636 HC RX REV CODE- 250/636: Performed by: PSYCHIATRY & NEUROLOGY

## 2017-11-08 PROCEDURE — 95816 EEG AWAKE AND DROWSY: CPT

## 2017-11-08 RX ADMIN — GADOTERIDOL 15 ML: 279.3 INJECTION, SOLUTION INTRAVENOUS at 13:25

## 2017-11-09 NOTE — PROCEDURES
[address]   [place], [address]   EEG       Name:  Kai Muñoz   MR#:  [id number]   :  1955   Account #:  [de-identified]    Date of Procedure:  2017   Date of Adm:  2017       DATE OF SERVICE: 2017     CLINICAL INDICATION: The patient is a 65-year-old, right-handed,    female with memory loss. Some confusion. Possible   dementia. Patient with altered mental status. EEG to rule out seizures rule out   cortical abnormality, rule out encephalopathy. ELECTROENCEPHALOGRAM CLASSIFICATION: This recording is essentially   normal awake and drowsy. The background of this recording contains a posteriorly located   occipital alpha rhythm of 8-9 Hz that attenuates some with eye   opening. Overall, amplitudes were somewhat low overall. There were   no clear focal slowing, no spike or spike-and-wave discharges seen. Hyperventilation was not performed. Photic stimulation produced a   mild driving response in the posterior head regions. The patient   remained in a state of wakefulness or drowsiness, but did not enter   any clear stages of sleep. The background rhythms at times are   somewhat borderline, but really no clear focal abnormality was   identified or generalized slowing seen. IMPRESSION: This is a somewhat borderline, but probably normal   electroencephalogram with the patient awake and drowsy. With the   amplitude somewhat low, and at times the frequencies seem a little on   the borderline side, but no clear continuous slowing. No focal   abnormalities. No spike discharges were seen. Clinical correlation   recommended.         MD LEONEL Bowles / TORO   D:  2017   17:23   T:  2017   11:45   Job #:  [id number]

## 2017-11-30 ENCOUNTER — OFFICE VISIT (OUTPATIENT)
Dept: NEUROLOGY | Age: 62
End: 2017-11-30

## 2017-11-30 VITALS
OXYGEN SATURATION: 99 % | WEIGHT: 133 LBS | BODY MASS INDEX: 21.38 KG/M2 | HEART RATE: 78 BPM | HEIGHT: 66 IN | SYSTOLIC BLOOD PRESSURE: 120 MMHG | DIASTOLIC BLOOD PRESSURE: 80 MMHG

## 2017-11-30 DIAGNOSIS — F03.90 DEMENTIA ARISING IN THE SENIUM AND PRESENIUM (HCC): Primary | ICD-10-CM

## 2017-11-30 DIAGNOSIS — D33.3 UNILATERAL VESTIBULAR SCHWANNOMA (HCC): ICD-10-CM

## 2017-11-30 RX ORDER — MEMANTINE HYDROCHLORIDE 28 MG/1
28 CAPSULE, EXTENDED RELEASE ORAL DAILY
Qty: 30 CAP | Refills: 6 | Status: SHIPPED | OUTPATIENT
Start: 2017-11-30 | End: 2018-04-03 | Stop reason: SINTOL

## 2017-11-30 NOTE — LETTER
11/30/2017 3:36 PM 
 
Patient:  Ariadna Perez YOB: 1955 Date of Visit: 11/30/2017 Dear Tarsha Hou, NP 
[address] [address] [place] [address] VIA In Basket 
 : Thank you for referring Ms. Antonino Sims to me for evaluation/treatment. Below are the relevant portions of my assessment and plan of care. HISTORY OF PRESENT ILLNESS Ariadna Perez is a 63 y.o. female. HPI Comments: Patient returns, memory has not changed. 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 She does state now that her boss at work is documenting everything that does or does not go wrong right. She continues to drive however she has gotten lost at least once. She is tolerating the Aricept 10 mg a day. She denies any other problems and her  denies any other problems. She has no bowel or bladder complaints. Review of Systems Constitutional:  
     Review of systems is positive for a recent fall in the yard with no significant injury, complete review of systems done all others negative. Current Outpatient Prescriptions on File Prior to Visit Medication Sig Dispense Refill  donepezil (ARICEPT) 5 mg tablet Take 1 tablet a day for 30 days then take 2 tablets a day. Indications: MILD TO MODERATE ALZHEIMER'S TYPE DEMENTIA 60 Tab 5  
 aspirin delayed-release 81 mg tablet Take 1 Tab by mouth daily. 30 Tab 0  pravastatin (PRAVACHOL) 10 mg tablet Take 1 Tab by mouth nightly. 30 Tab 6 No current facility-administered medications on file prior to visit. Past Medical History:  
Diagnosis Date  Breast cancer ([address] 2008 Left  [facility] [address] [address]  Hyperlipidemia 6/30/2017  Radiation therapy complication Lt breast 2008--no chemo Family History Problem Relation Age of Onset  Lung Disease Brother  Breast Cancer Sister 43 Social History Substance Use Topics  Smoking status: Never Smoker  Smokeless tobacco: Never Used  Alcohol use No  
 
Patient Active Problem List  
Diagnosis Code  History of breast cancer Z85.3  Hyperlipidemia E78.5  Early onset Alzheimer's dementia without behavioral disturbance G30.0, F02.80  Altered mental status, unspecified R41.82  
 
/80  Pulse 78  Ht 5' 6\" (1.676 m)  Wt 133 lb (60.3 kg)  SpO2 99%  BMI 21.47 kg/m2 MRI Results (most recent): 
 
Results from Hospital Encounter encounter on 11/08/17 MRI BRAIN W WO CONT Narrative EXAM:  MRI BRAIN W WO CONT INDICATION:   Rapid progression of dementia. Left breast carcinoma in 2008. COMPARISON: CT head on 8/7/2017. TECHNIQUE: Sagittal T1; coronal post contrast T1; axial T1, T2, FLAIR, 
gradient-echo, diffusion weighted MRI of the brain before and following 
uneventful intravenous administration of gadolinium 13 mL ProHance FINDINGS: No hydrocephalus. No mass effect or midline shift. No extra-axial 
fluid collection. Minimal chronic microvascular ischemic disease for age. No 
restricted diffusion to indicate acute infarct. Enhancing mass of the left cerebellopontine angle measures 1.8 x 1.2 x 1.5 cm 
and extends into the left internal auditory canal. Mild mass effect on the left 
cerebellar peduncle. No displacement of the carrie or medulla. The major intracranial vascular flow-voids are patent. The midline structures, 
including the cervicomedullary junction, are within normal limits. Impression IMPRESSION: 
 
1. Minimal chronic microvascular ischemic disease. No acute infarct. 2. 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 
schwannoma. Mild mass effect on the left middle cerebellar peduncle. Physical Exam  
Constitutional: She appears well-developed and well-nourished. No distress. HENT:  
Head: Normocephalic and atraumatic. Mouth/Throat: Oropharynx is clear and moist. No oropharyngeal exudate. Eyes: Conjunctivae and EOM are normal. Pupils are equal, round, and reactive to light. No scleral icterus. Saccades are a little jerky and not smooth. Neck: Neck supple. No thyromegaly present. Cardiovascular: Normal rate, regular rhythm and normal heart sounds. No murmur heard. Musculoskeletal: Normal range of motion. She exhibits no edema, tenderness or deformity. Lymphadenopathy:  
  She has no cervical adenopathy. Skin: Skin is warm and dry. No rash noted. She is not diaphoretic. No erythema. Psychiatric: She has a normal mood and affect. Her behavior is normal. 
She is pleasantly mildly confused but understands that there are problems at work ASSESSMENT and PLAN 
DEMENTIA Patient has an early fairly rapidly progressive dementia. She is tolerating the Aricept 10 mg a day, I am going to put her on a Namenda starter pack and then on Namenda 28 mg a day. I recommended to the  that he go with her to work to talk with the supervisor so that they can arrange an orderly non-adversarial custodial for this woman. I will be happy to fill out her disability papers when she is out of work, I did explain to him that there is usually a waiting period for disability. I will follow the CP angle tumor with a repeat MRI scan in 6 months. Given the size of the tumor and the limited mass-effect I think this is a very slow-growing tumor. This note will not be viewable in [address] If you have questions, please do not hesitate to call me. I look forward to following Ms. Amaar Bryant along with you. Sincerely, Richard Rouse MD

## 2017-11-30 NOTE — MR AVS SNAPSHOT
Visit Information Date & Time Provider Department Dept. Phone Encounter #  
 11/30/2017  2:40 PM Jesse Chapin MD Neurology Clinic at [facility] [phone] [id number] Your Appointments 4/3/2018  2:40 PM  
Follow Up with Jesse Chapin MD  
Neurology Clinic at [facility]) Appt Note: follow up $ 0 CP jll 11/30/17  
 [address] [facility], 
[address] [place] [address], [address] [address]  
[address] [facility], [address] [facility], [address] [address] Upcoming Health Maintenance Date Due ZOSTER VACCINE AGE 60> 10/3/2017 BREAST CANCER SCRN MAMMOGRAM 4/4/2018 PAP AKA CERVICAL CYTOLOGY 8/9/2020 DTaP/Tdap/Td series (2 - Td) 8/9/2027 Allergies as of 11/30/2017  Review Complete On: 11/30/2017 By: Rosie Suero LPN Severity Noted Reaction Type Reactions Pcn [Penicillins]  09/28/2010    Hives Current Immunizations  Reviewed on 8/9/2017 Name Date Influenza Vaccine Split 1/19/2012 Tdap 8/9/2017 Not reviewed this visit Vitals BP Pulse Height(growth percentile) Weight(growth percentile) SpO2 BMI  
 120/80 78 5' 6\" (1.676 m) 133 lb (60.3 kg) 99% 21.47 kg/m2 OB Status Smoking Status Postmenopausal Never Smoker Vitals History BMI and BSA Data Body Mass Index Body Surface Area  
 21.47 kg/m 2 1.68 m 2 Your Updated Medication List  
  
   
This list is accurate as of: 11/30/17  3:23 PM.  Always use your most recent med list.  
  
  
  
  
 aspirin delayed-release 81 mg tablet Take 1 Tab by mouth daily. donepezil 5 mg tablet Commonly known as:  ARICEPT Take 1 tablet a day for 30 days then take 2 tablets a day. Indications: MILD TO MODERATE ALZHEIMER'S TYPE DEMENTIA * memantine 7-14-21-28 mg C24k Take 1 Tab by mouth daily.  Indications: MODERATE TO SEVERE ALZHEIMER'S TYPE DEMENTIA, Take 1 a day until pills are gone then start on regular memantine ER prescription * memantine ER 28 mg capsule Commonly known as:  NAMENDA XR Take 1 Cap by mouth daily. Indications: MODERATE TO SEVERE ALZHEIMER'S TYPE DEMENTIA, Begin once a day after starter pack completed. pravastatin 10 mg tablet Commonly known as:  PRAVACHOL Take 1 Tab by mouth nightly. * Notice: This list has 2 medication(s) that are the same as other medications prescribed for you. Read the directions carefully, and ask your doctor or other care provider to review them with you. Prescriptions Printed Refills  
 memantine 7-14-21-28 mg C24k 0 Sig: Take 1 Tab by mouth daily. Indications: MODERATE TO SEVERE ALZHEIMER'S TYPE DEMENTIA, Take 1 a day until pills are gone then start on regular memantine ER prescription Class: Print Route: Oral  
 memantine ER (NAMENDA XR) 28 mg capsule 6 Sig: Take 1 Cap by mouth daily. Indications: MODERATE TO SEVERE ALZHEIMER'S TYPE DEMENTIA, Begin once a day after starter pack completed. Class: Print Route: Oral  
  
Introducing [facility]! [employer] introduces [employer] patient portal. Now you can access parts of your medical record, email your doctor's office, and request medication refills online. 1. In your internet browser, go to https://"[employer]/"[employer]" 2. Click on the First Time User? Click Here link in the Sign In box. You will see the New Member Sign Up page. 3. Enter your [employer] Access Code exactly as it appears below. You will not need to use this code after youve completed the sign-up process. If you do not sign up before the expiration date, you must request a new code. · [employer] Access Code: [email or web address] Expires: 1/15/2018  6:48 AM 
 
4. Enter the last four digits of your Social Security Number (xxxx) and Date of Birth (mm/dd/yyyy) as indicated and click Submit.  You will be taken to the next sign-up page. 5. Create a [employer] ID. This will be your [employer] login ID and cannot be changed, so think of one that is secure and easy to remember. 6. Create a [employer] password. You can change your password at any time. 7. Enter your Password Reset Question and Answer. This can be used at a later time if you forget your password. 8. Enter your e-mail address. You will receive e-mail notification when new information is available in [address] 9. Click Sign Up. You can now view and download portions of your medical record. 10. Click the Download Summary menu link to download a portable copy of your medical information. If you have questions, please visit the Frequently Asked Questions section of the [employer] website. Remember, [employer] is NOT to be used for urgent needs. For medical emergencies, dial 911. Now available from your iPhone and Android! Please provide this summary of care documentation to your next provider. Your primary care clinician is listed as NAYAN BRADY. If you have any questions after today's visit, please call [phone].

## 2018-02-02 ENCOUNTER — DOCUMENTATION ONLY (OUTPATIENT)
Dept: NEUROLOGY | Age: 63
End: 2018-02-02

## 2018-03-16 ENCOUNTER — TELEPHONE (OUTPATIENT)
Dept: FAMILY MEDICINE CLINIC | Age: 63
End: 2018-03-16

## 2018-03-19 ENCOUNTER — TELEPHONE (OUTPATIENT)
Dept: FAMILY MEDICINE CLINIC | Age: 63
End: 2018-03-19

## 2018-04-03 ENCOUNTER — OFFICE VISIT (OUTPATIENT)
Dept: NEUROLOGY | Age: 63
End: 2018-04-03

## 2018-04-03 VITALS
OXYGEN SATURATION: 98 % | HEART RATE: 84 BPM | BODY MASS INDEX: 22.98 KG/M2 | DIASTOLIC BLOOD PRESSURE: 80 MMHG | SYSTOLIC BLOOD PRESSURE: 124 MMHG | HEIGHT: 66 IN | WEIGHT: 143 LBS

## 2018-04-03 DIAGNOSIS — E78.01 FAMILIAL HYPERCHOLESTEROLEMIA: ICD-10-CM

## 2018-04-03 DIAGNOSIS — G30.9 ALZHEIMER'S DEMENTIA WITHOUT BEHAVIORAL DISTURBANCE, UNSPECIFIED TIMING OF DEMENTIA ONSET: Primary | ICD-10-CM

## 2018-04-03 DIAGNOSIS — F02.80 ALZHEIMER'S DEMENTIA WITHOUT BEHAVIORAL DISTURBANCE, UNSPECIFIED TIMING OF DEMENTIA ONSET: Primary | ICD-10-CM

## 2018-04-03 DIAGNOSIS — D33.3 CPA (CEREBELLOPONTINE ANGLE) TUMOR (HCC): ICD-10-CM

## 2018-04-03 NOTE — PATIENT INSTRUCTIONS
[facility]   Statement to Patients  April 1, 2014      In an effort to ensure the large volume of patient prescription refills is processed in the most efficient and expeditious manner, we are asking our patients to assist us by calling your Pharmacy for all prescription refills, this will include also your  Mail Order Pharmacy. The pharmacy will contact our office electronically to continue the refill process. Please do not wait until the last minute to call your pharmacy. We need at least 48 hours (2days) to fill prescriptions. We also encourage you to call your pharmacy before going to  your prescription to make sure it is ready. With regard to controlled substance prescription refill requests (narcotic refills) that need to be picked up at our office, we ask your cooperation by providing us with at least 72 hours (3days) notice that you will need a refill. We will not refill narcotic prescription refill requests after 4:00pm on any weekday, Monday through Thursday, or after 2:00pm on Fridays, or on the weekends. We encourage everyone to explore another way of getting your prescription refill request processed using [employer], our patient web portal through our electronic medical record system. [employer] is an efficient and effective way to communicate your medication request directly to the office and  downloadable as an ted on your smart phone . [employer] also features a review functionality that allows you to view your medication list as well as leave messages for your physician. Are you ready to get connected? If so please review the attatched instructions or speak to any of our staff to get you set up right away! Thank you so much for your cooperation. Should you have any questions please contact our Practice Administrator.     The Physicians and Staff,  [facility]

## 2018-04-03 NOTE — PROGRESS NOTES
HISTORY OF PRESENT ILLNESS  Harshil Mckinley is a 63 y.o. female. HPI Comments: Patient returns, her memory is slightly worse. 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 She is now out of work and has filled out her disability papers. She has a diagnosis of dementia, most likely of the Alzheimer type. She continues to take her donepezil 10 mg a day. She did not tolerate the Namenda as it made her nauseated. Review of Systems   Constitutional:        Complete review of systems done all others negative. Current Outpatient Prescriptions on File Prior to Visit   Medication Sig Dispense Refill    donepezil (ARICEPT) 5 mg tablet Take 1 tablet a day for 30 days then take 2 tablets a day. Indications: MILD TO MODERATE ALZHEIMER'S TYPE DEMENTIA 60 Tab 5    aspirin delayed-release 81 mg tablet Take 1 Tab by mouth daily. 30 Tab 0    pravastatin (PRAVACHOL) 10 mg tablet Take 1 Tab by mouth nightly. 30 Tab 6    memantine 7-14-21-28 mg C24k Take 1 Tab by mouth daily. Indications: MODERATE TO SEVERE ALZHEIMER'S TYPE DEMENTIA, Take 1 a day until pills are gone then start on regular memantine ER prescription 1 Box 0     No current facility-administered medications on file prior to visit.       Past Medical History:   Diagnosis Date    Breast [facility] ([facility] [address] 2008    Left    Cancer ([facility]     Hyperlipidemia 6/30/2017    Memory disorder     Radiation therapy complication     Lt breast 2008--no chemo     Family History   Problem Relation Age of Onset    Lung Disease Brother     Breast Cancer Sister      43     Social History   Substance Use Topics    Smoking status: Never Smoker    Smokeless tobacco: Never Used    Alcohol use No     Patient Active Problem List   Diagnosis Code    History of breast cancer Z85.3    Hyperlipidemia E78.5    Early onset Alzheimer's dementia without behavioral disturbance G30.0, F02.80    Altered mental status, unspecified R41.82     /80  Pulse 84  Ht 5' 6\" (1.676 m)  Wt 143 lb (64.9 kg)  SpO2 98%  BMI 23.08 kg/m2    MRI Results (most recent):    Results from Hospital Encounter encounter on 11/08/17   MRI BRAIN W WO CONT   Narrative EXAM:  MRI BRAIN W WO CONT    INDICATION:   Rapid progression of dementia. Left breast carcinoma in 2008. COMPARISON: CT head on 8/7/2017. TECHNIQUE: Sagittal T1; coronal post contrast T1; axial T1, T2, FLAIR,  gradient-echo, diffusion weighted MRI of the brain before and following  uneventful intravenous administration of gadolinium 13 mL ProHance    FINDINGS: No hydrocephalus. No mass effect or midline shift. No extra-axial  fluid collection. Minimal chronic microvascular ischemic disease for age. No  restricted diffusion to indicate acute infarct. Enhancing mass of the left cerebellopontine angle measures 1.8 x 1.2 x 1.5 cm  and extends into the left internal auditory canal. Mild mass effect on the left  cerebellar peduncle. No displacement of the carrie or medulla. The major intracranial vascular flow-voids are patent. The midline structures,  including the cervicomedullary junction, are within normal limits. Impression IMPRESSION:    1. Minimal chronic microvascular ischemic disease. No acute infarct. 2. 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 Mild mass effect on the left middle cerebellar peduncle. Physical Exam   Constitutional: She appears well-developed and well-nourished. No distress. HENT:   Head: Normocephalic and atraumatic. Psychiatric: She has a normal mood and affect. Her behavior is normal.  She is pleasantly mildly confused, I did not do a formal MMSE today however it was markedly abnormal in the past with a score of 18    ASSESSMENT and PLAN  DEMENTIA  Patient has an early fairly rapidly progressive Alzheimer type dementia.   She is tolerating the Aricept 10 mg a day, given that she did not tolerate the Namenda I will not attempt to start that again. I will plan to see her back in 6 months. LEFT CP ANGLE TUMOR  This appears to be a very slow-growing tumor. I will repeat her MRI scan annually to follow it. This note will not be viewable in [address] This note was created using voice recognition software. Despite editing, there may be syntax errors.

## 2018-04-03 NOTE — LETTER
4/3/2018 2:52 PM 
 
Ms. Helen Simpson [address] [id number] To Whom It May Concern; It is my opinion as the neurologist caring for Helen Simpson, [birth year], that she does have the mental capacity to designate her  as her power of . Sincerely, Yuliet Latham MD

## 2018-04-03 NOTE — LETTER
4/3/2018 3:03 PM 
 
Patient:  Helen Simpson YOB: 1955 Date of Visit: 4/3/2018 Dear Titus Shaikh, YAJAIRA 
[address] [facility] [address] [facility] [address] VIA In Basket 
 : Thank you for referring Ms. Lenny Goins to me for evaluation/treatment. Below are the relevant portions of my assessment and plan of care. HISTORY OF PRESENT ILLNESS Helen Simpson is a 63 y.o. female. HPI Comments: Patient returns, her memory is slightly worse. 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 She is now out of work and has filled out her disability papers. She has a diagnosis of dementia, most likely of the Alzheimer type. She continues to take her donepezil 10 mg a day. She did not tolerate the Namenda as it made her nauseated. Review of Systems Constitutional:  
     Complete review of systems done all others negative. Current Outpatient Prescriptions on File Prior to Visit Medication Sig Dispense Refill  donepezil (ARICEPT) 5 mg tablet Take 1 tablet a day for 30 days then take 2 tablets a day. Indications: MILD TO MODERATE ALZHEIMER'S TYPE DEMENTIA 60 Tab 5  
 aspirin delayed-release 81 mg tablet Take 1 Tab by mouth daily. 30 Tab 0  pravastatin (PRAVACHOL) 10 mg tablet Take 1 Tab by mouth nightly. 30 Tab 6  
 memantine 7-14-21-28 mg C24k Take 1 Tab by mouth daily. Indications: MODERATE TO SEVERE ALZHEIMER'S TYPE DEMENTIA, Take 1 a day until pills are gone then start on regular memantine ER prescription 1 Box 0 No current facility-administered medications on file prior to visit. Past Medical History:  
Diagnosis Date  Breast cancer ([address] 2008 Left  Cancer ([facility] [address] [facility]  Hyperlipidemia 6/30/2017  Memory disorder  Radiation therapy complication Lt breast 2008--no chemo Family History Problem Relation Age of Onset  Lung Disease Brother  Breast Cancer Sister 43 Social History Substance Use Topics  Smoking status: Never Smoker  Smokeless tobacco: Never Used  Alcohol use No  
 
Patient Active Problem List  
Diagnosis Code  History of breast cancer Z85.3  Hyperlipidemia E78.5  Early onset Alzheimer's dementia without behavioral disturbance G30.0, F02.80  Altered mental status, unspecified R41.82  
 
/80  Pulse 84  Ht 5' 6\" (1.676 m)  Wt 143 lb (64.9 kg)  SpO2 98%  BMI 23.08 kg/m2 MRI Results (most recent): 
 
Results from Hospital Encounter encounter on 11/08/17 MRI BRAIN W WO CONT Narrative EXAM:  MRI BRAIN W WO CONT INDICATION:   Rapid progression of dementia. Left breast carcinoma in 2008. COMPARISON: CT head on 8/7/2017. TECHNIQUE: Sagittal T1; coronal post contrast T1; axial T1, T2, FLAIR, 
gradient-echo, diffusion weighted MRI of the brain before and following 
uneventful intravenous administration of gadolinium 13 mL ProHance FINDINGS: No hydrocephalus. No mass effect or midline shift. No extra-axial 
fluid collection. Minimal chronic microvascular ischemic disease for age. No 
restricted diffusion to indicate acute infarct. Enhancing mass of the left cerebellopontine angle measures 1.8 x 1.2 x 1.5 cm 
and extends into the left internal auditory canal. Mild mass effect on the left 
cerebellar peduncle. No displacement of the carrie or medulla. The major intracranial vascular flow-voids are patent. The midline structures, 
including the cervicomedullary junction, are within normal limits. Impression IMPRESSION: 
 
1. Minimal chronic microvascular ischemic disease. No acute infarct. 2. 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 
schwannoma. Mild mass effect on the left middle cerebellar peduncle. Physical Exam  
Constitutional: She appears well-developed and well-nourished. No distress. HENT:  
Head: Normocephalic and atraumatic. Psychiatric: She has a normal mood and affect. Her behavior is normal. 
She is pleasantly mildly confused, I did not do a formal MMSE today however it was markedly abnormal in the past with a score of 18 ASSESSMENT and PLAN 
DEMENTIA Patient has an early fairly rapidly progressive Alzheimer type dementia. She is tolerating the Aricept 10 mg a day, given that she did not tolerate the Namenda I will not attempt to start that again. I will plan to see her back in 6 months. LEFT CP ANGLE TUMOR This appears to be a very slow-growing tumor. I will repeat her MRI scan annually to follow it. This note will not be viewable in [address] This note was created using voice recognition software. Despite editing, there may be syntax errors. If you have questions, please do not hesitate to call me. I look forward to following MsAlyson Jackie Dorsey along with you. Sincerely, Diana Lucero MD

## 2018-04-03 NOTE — MR AVS SNAPSHOT
[address] [facility] [address], 
[id number], [address] [facility] 
[phone] Patient: Mario Alberto Galindo MRN:  [id number] Visit Information Date & Time Provider Department Dept. Phone Encounter #  
 4/3/2018  2:40 PM Kathy Sanchez MD Neurology Clinic at [facility] [phone] [id number] Your Appointments 10/2/2018  2:40 PM  
Follow Up with Kathy Sanchez MD  
[facility]: follow up dementia $ 0  CP jll 4/3/18  
 [address] [facility], 
[address] [facility], [address] [address]  
[address] [facility], [address] [place] [address], [address] [place] [address] [address] [address] Upcoming Health Maintenance Date Due ZOSTER VACCINE AGE 60> 10/3/2017 BREAST CANCER SCRN MAMMOGRAM 4/4/2018 PAP AKA CERVICAL CYTOLOGY 8/9/2020 DTaP/Tdap/Td series (2 - Td) 8/9/2027 Allergies as of 4/3/2018  Review Complete On: 4/3/2018 By: Kathy Sanchez MD  
  
 Severity Noted Reaction Type Reactions Pcn [Penicillins]  09/28/2010    Hives Current Immunizations  Reviewed on 8/9/2017 Name Date Influenza Vaccine Split 1/19/2012 Tdap 8/9/2017 Not reviewed this visit Vitals BP Pulse Height(growth percentile) Weight(growth percentile) SpO2 BMI  
 124/80 84 5' 6\" (1.676 m) 143 lb (64.9 kg) 98% 23.08 kg/m2 OB Status Smoking Status Postmenopausal Never Smoker Vitals History BMI and BSA Data Body Mass Index Body Surface Area 23.08 kg/m 2 1.74 m 2 Preferred Pharmacy Pharmacy Name Phone [facility] [address] [facility], [address] [place] [address] Rolf Mccoy [address] Your Updated Medication List  
  
   
This list is accurate as of 4/3/18  3:02 PM.  Always use your most recent med list.  
  
  
  
  
 aspirin delayed-release 81 mg tablet Take 1 Tab by mouth daily. donepezil 5 mg tablet Commonly known as:  ARICEPT Take 1 tablet a day for 30 days then take 2 tablets a day. Indications: MILD TO MODERATE ALZHEIMER'S TYPE DEMENTIA  
  
 memantine 7-14-21-28 mg C24k Take 1 Tab by mouth daily. Indications: MODERATE TO SEVERE ALZHEIMER'S TYPE DEMENTIA, Take 1 a day until pills are gone then start on regular memantine ER prescription  
  
 pravastatin 10 mg tablet Commonly known as:  PRAVACHOL Take 1 Tab by mouth nightly. To-Do List   
 04/06/2018 1:40 PM  
  Appointment with [facility] J[facility]IER 3 at [address] [place] [address] ([phone]) Shower or bathe using soap and water. Do not use deodorant, powder, perfumes, or lotion the day of your exam.  If your prior mammograms were not performed at [facility] 6 please bring films with you or forward prior images 2 days before your procedure. Check in at registration 15min before your appointment time unless you were instructed to do otherwise. A script is not necessary, but if you have one, please bring it on the day of the mammogram or have it faxed to the department. [facility] [phone] [address] [place] [address]  [phone] [address]  [phone] [facility] [phone] [address] [phone] Patient Instructions PRESCRIPTION REFILL POLICY [facility] Statement to Patients April 1, 2014 In an effort to ensure the large volume of patient prescription refills is processed in the most efficient and expeditious manner, we are asking our patients to assist us by calling your Pharmacy for all prescription refills, this will include also your  Mail Order Pharmacy. The pharmacy will contact our office electronically to continue the refill process. Please do not wait until the last minute to call your pharmacy. We need at least 48 hours (2days) to fill prescriptions.  We also encourage you to call your pharmacy before going to  your prescription to make sure it is ready. With regard to controlled substance prescription refill requests (narcotic refills) that need to be picked up at our office, we ask your cooperation by providing us with at least 72 hours (3days) notice that you will need a refill. We will not refill narcotic prescription refill requests after 4:00pm on any weekday, Monday through Thursday, or after 2:00pm on Fridays, or on the weekends. We encourage everyone to explore another way of getting your prescription refill request processed using [employer], our patient web portal through our electronic medical record system. [employer] is an efficient and effective way to communicate your medication request directly to the office and  downloadable as an ted on your smart phone . [employer] also features a review functionality that allows you to view your medication list as well as leave messages for your physician. Are you ready to get connected? If so please review the attatched instructions or speak to any of our staff to get you set up right away! Thank you so much for your cooperation. Should you have any questions please contact our Practice Administrator. The Physicians and Staff,  [facility] Introducing [facility] & [facility] SERVICES! [facility] introduces [employer] patient portal. Now you can access parts of your medical record, email your doctor's office, and request medication refills online. 1. In your internet browser, go to [email or web address] 2. Click on the First Time User? Click Here link in the Sign In box. You will see the New Member Sign Up page. 3. Enter your [employer] Access Code exactly as it appears below. You will not need to use this code after youve completed the sign-up process. If you do not sign up before the expiration date, you must request a new code. · [employer] Access Code: [email or web address] Expires: 6/11/2018  5:18 PM 
 
 4. Enter the last four digits of your Social Security Number (xxxx) and Date of Birth (mm/dd/yyyy) as indicated and click Submit. You will be taken to the next sign-up page. 5. Create a [employer] ID. This will be your [employer] login ID and cannot be changed, so think of one that is secure and easy to remember. 6. Create a [employer] password. You can change your password at any time. 7. Enter your Password Reset Question and Answer. This can be used at a later time if you forget your password. 8. Enter your e-mail address. You will receive e-mail notification when new information is available in [address] 9. Click Sign Up. You can now view and download portions of your medical record. 10. Click the Download Summary menu link to download a portable copy of your medical information. If you have questions, please visit the Frequently Asked Questions section of the [employer] website. Remember, [employer] is NOT to be used for urgent needs. For medical emergencies, dial 911. Now available from your iPhone and Android! Please provide this summary of care documentation to your next provider. Your primary care clinician is listed as NAYAN BRADY. If you have any questions after today's visit, please call [phone].

## 2018-04-06 ENCOUNTER — HOSPITAL ENCOUNTER (OUTPATIENT)
Dept: MAMMOGRAPHY | Age: 63
Discharge: HOME | End: 2018-04-06
Attending: NURSE PRACTITIONER
Payer: COMMERCIAL

## 2018-04-06 DIAGNOSIS — Z12.31 VISIT FOR SCREENING MAMMOGRAM: ICD-10-CM

## 2018-04-06 PROCEDURE — 77067 SCR MAMMO BI INCL CAD: CPT

## 2018-04-18 ENCOUNTER — DOCUMENTATION ONLY (OUTPATIENT)
Dept: NEUROLOGY | Age: 63
End: 2018-04-18

## 2018-07-31 ENCOUNTER — DOCUMENTATION ONLY (OUTPATIENT)
Dept: NEUROLOGY | Age: 63
End: 2018-07-31

## 2018-09-07 DIAGNOSIS — E78.5 HYPERLIPIDEMIA, UNSPECIFIED HYPERLIPIDEMIA TYPE: ICD-10-CM

## 2018-09-07 RX ORDER — PRAVASTATIN SODIUM 10 MG/1
TABLET ORAL
Qty: 30 TAB | Refills: 6 | Status: SHIPPED | OUTPATIENT
Start: 2018-09-07 | End: 2019-04-26 | Stop reason: SDUPTHER

## 2018-10-02 ENCOUNTER — OFFICE VISIT (OUTPATIENT)
Dept: NEUROLOGY | Age: 63
End: 2018-10-02

## 2018-10-02 VITALS
HEIGHT: 66 IN | OXYGEN SATURATION: 99 % | HEART RATE: 60 BPM | SYSTOLIC BLOOD PRESSURE: 130 MMHG | DIASTOLIC BLOOD PRESSURE: 70 MMHG | BODY MASS INDEX: 22.34 KG/M2 | WEIGHT: 139 LBS

## 2018-10-02 DIAGNOSIS — F02.80 ALZHEIMER'S DEMENTIA WITHOUT BEHAVIORAL DISTURBANCE, UNSPECIFIED TIMING OF DEMENTIA ONSET: Primary | ICD-10-CM

## 2018-10-02 DIAGNOSIS — G30.9 ALZHEIMER'S DEMENTIA WITHOUT BEHAVIORAL DISTURBANCE, UNSPECIFIED TIMING OF DEMENTIA ONSET: Primary | ICD-10-CM

## 2018-10-02 RX ORDER — DONEPEZIL HYDROCHLORIDE 10 MG/1
10 TABLET, FILM COATED ORAL
Qty: 30 TAB | Refills: 5 | Status: SHIPPED | OUTPATIENT
Start: 2018-10-02 | End: 2019-04-19 | Stop reason: SDUPTHER

## 2018-10-02 RX ORDER — MEMANTINE HYDROCHLORIDE 5 MG/1
TABLET ORAL
Qty: 60 TAB | Refills: 5 | Status: SHIPPED | OUTPATIENT
Start: 2018-10-02 | End: 2019-05-16 | Stop reason: ALTCHOICE

## 2018-10-02 NOTE — MR AVS SNAPSHOT
[address] [facility] [address], 
[id number], [address] [facility] 83. 
[phone] Patient: Jody Wood MRN:  [id number] Visit Information Date & Time Provider Department Dept. Phone Encounter #  
 10/2/2018  2:40 PM Devon Avery MD Neurology Clinic at [facility] [phone] [id number] Follow-up Instructions Return in about 4 months (around 2/2/2019). Your Appointments 2/7/2019  2:40 PM  
Follow Up with Devon Avery MD  
Neurology Clinic at [facility] Appt Note: follow up memory loss $ 0 CP jll 10/2/18  
 [address] [facility], 
[address] [place] [address], [address] [address]  
[facility], [address] [place] [address], [address] [place] [address] [address] [address] Upcoming Health Maintenance Date Due Shingrix Vaccine Age 50> (1 of 2) 12/3/2007 Influenza Age 5 to Adult 8/1/2018 BREAST CANCER SCRN MAMMOGRAM 4/6/2019 PAP AKA CERVICAL CYTOLOGY 8/9/2020 DTaP/Tdap/Td series (2 - Td) 8/9/2027 COLONOSCOPY 5/16/2028 Allergies as of 10/2/2018  Review Complete On: 10/2/2018 By: Devon Avery MD  
  
 Severity Noted Reaction Type Reactions Pcn [Penicillins]  09/28/2010    Hives Current Immunizations  Reviewed on 8/9/2017 Name Date Influenza Vaccine Split 1/19/2012 Tdap 8/9/2017 Not reviewed this visit You Were Diagnosed With   
  
 Codes Comments Alzheimer's dementia without behavioral disturbance, unspecified timing of dementia onset    -  Primary ICD-10-CM: G30.9, F02.80 ICD-9-CM: 331.0, 294.10 Vitals BP Pulse Height(growth percentile) Weight(growth percentile) SpO2 BMI  
 130/70 60 5' 6\" (1.676 m) 139 lb (63 kg) 99% 22.44 kg/m2 OB Status Smoking Status Postmenopausal Never Smoker Vitals History BMI and BSA Data Body Mass Index Body Surface Area 22.44 kg/m 2 1.71 m 2 Preferred Pharmacy Pharmacy Name Phone [facility] [address] [facility], [facility] Rolf Mccoy [address] Your Updated Medication List  
  
   
This list is accurate as of 10/2/18  3:22 PM.  Always use your most recent med list.  
  
  
  
  
 aspirin delayed-release 81 mg tablet Take 1 Tab by mouth daily. * donepezil 5 mg tablet Commonly known as:  ARICEPT Take 1 tablet a day for 30 days then take 2 tablets a day. Indications: MILD TO MODERATE ALZHEIMER'S TYPE DEMENTIA * donepezil 10 mg tablet Commonly known as:  ARICEPT Take 1 Tab by mouth nightly. memantine 5 mg tablet Commonly known as:  NAMENDA  
1 p.o. daily for 30 days and then 1 p.o. twice daily. Indications: MODERATE TO SEVERE ALZHEIMER'S TYPE DEMENTIA  
  
 pravastatin 10 mg tablet Commonly known as:  PRAVACHOL  
take 1 tablet by mouth at bedtime * Notice: This list has 2 medication(s) that are the same as other medications prescribed for you. Read the directions carefully, and ask your doctor or other care provider to review them with you. Prescriptions Sent to Pharmacy Refills  
 memantine (NAMENDA) 5 mg tablet 5 Si p.o. daily for 30 days and then 1 p.o. twice daily. Indications: MODERATE TO SEVERE ALZHEIMER'S TYPE DEMENTIA Class: Normal  
 Pharmacy: [id number] [address] [place] [address] Ph #: [phone]  
 donepezil (ARICEPT) 10 mg tablet 5 Sig: Take 1 Tab by mouth nightly. Class: Normal  
 Pharmacy: [id number]out Ph #: [phone] Route: Oral  
  
Follow-up Instructions Return in about 4 months (around 2019). To-Do List   
 10/20/2018 Imaging:  MRI BRAIN W WO CONT Patient Instructions Office Policies 
o Phone calls/patient messages: Please allow up to 24 hours for someone in the office to contact you about your call or message. Be mindful your provider may be out of the office or your message may require further review. We encourage you to use [employer] for your messages as this is a faster, more efficient way to communicate with our office 
o Medication Refills: 
Prescription medications require up to 48 business hours to process. We encourage you to use [employer] for your refills. For controlled medications: Please allow up to 72 business hours to process. Certain medications may require you to  a written prescription at our office. NO narcotic/controlled medications will be prescribed after 4pm Monday through Friday or on weekends 
o Form/Paperwork Completion: 
Please note there is a $25 fee for all paperwork completed by our providers. We ask that you allow 7-14 business days. Pre-payment is due prior to picking up/faxing the completed form. You may also download your forms to [employer] to have your doctor print off. 
o Test Results: In order for a patient to obtain test results, an appointment must be made with the physician to review the results. Test results cannot be discussed over the phone. Introducing [facility] & HEALTH SERVICES! Tierney Barajas introduces [employer] patient portal. Now you can access parts of your medical record, email your doctor's office, and request medication refills online. 1. In your internet browser, go to [email or web address] 2. Click on the First Time User? Click Here link in the Sign In box. You will see the New Member Sign Up page. 3. Enter your [employer] Access Code exactly as it appears below. You will not need to use this code after youve completed the sign-up process. If you do not sign up before the expiration date, you must request a new code. · [employer] Access Code: [email or web address] Expires: 12/31/2018  3:19 PM 
 
4.  Enter the last four digits of your Social Security Number (xxxx) and Date of Birth (mm/dd/yyyy) as indicated and click Submit. You will be taken to the next sign-up page. 5. Create a [employer] ID. This will be your [employer] login ID and cannot be changed, so think of one that is secure and easy to remember. 6. Create a [employer] password. You can change your password at any time. 7. Enter your Password Reset Question and Answer. This can be used at a later time if you forget your password. 8. Enter your e-mail address. You will receive e-mail notification when new information is available in [address] 9. Click Sign Up. You can now view and download portions of your medical record. 10. Click the Download Summary menu link to download a portable copy of your medical information. If you have questions, please visit the Frequently Asked Questions section of the [employer] website. Remember, [employer] is NOT to be used for urgent needs. For medical emergencies, dial 911. Now available from your iPhone and Android! Please provide this summary of care documentation to your next provider. Your primary care clinician is listed as NAYAN BRADY. If you have any questions after today's visit, please call [phone].

## 2018-10-02 NOTE — PROGRESS NOTES
HISTORY OF PRESENT ILLNESS  Kelton Medrano is a 63 y.o. female. HPI Comments: Patient returns, her memory is markedly worse. 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  states that she now can do very little for herself. I was surprised she was able to work as long as she could, I think her coworkers were supporting her. Complete review of systems done all others negative. Current Outpatient Prescriptions on File Prior to Visit   Medication Sig Dispense Refill    pravastatin (PRAVACHOL) 10 mg tablet take 1 tablet by mouth at bedtime 30 Tab 6    donepezil (ARICEPT) 5 mg tablet Take 1 tablet a day for 30 days then take 2 tablets a day. Indications: MILD TO MODERATE ALZHEIMER'S TYPE DEMENTIA 60 Tab 5    aspirin delayed-release 81 mg tablet Take 1 Tab by mouth daily. 30 Tab 0     No current facility-administered medications on file prior to visit. Past Medical History:   Diagnosis Date    [facility] ([facility] [address] [facility] 2008    Left    Cancer ([facility]     Hyperlipidemia 6/30/2017    Memory disorder     Radiation therapy complication     Lt breast 2008--no chemo     Family History   Problem Relation Age of Onset    Lung Disease Brother     Breast Cancer Sister      43     Social History   Substance Use Topics    Smoking status: Never Smoker    Smokeless tobacco: Never Used    Alcohol use No     Patient Active Problem List   Diagnosis Code    History of breast cancer Z85.3    Hyperlipidemia E78.5    Early onset Alzheimer's dementia without behavioral disturbance G30.0, F02.80    Altered mental status, unspecified R41.82     /70  Pulse 60  Ht 5' 6\" (1.676 m)  Wt 63 kg (139 lb)  SpO2 99%  BMI 22.44 kg/m2    MRI Results (most recent):    Results from [place] encounter on 11/08/17   MRI BRAIN W WO CONT   Narrative EXAM:  MRI BRAIN W WO CONT    INDICATION:   Rapid progression of dementia.  Left breast carcinoma in 2008.    COMPARISON: CT head on 8/7/2017. TECHNIQUE: Sagittal T1; coronal post contrast T1; axial T1, T2, FLAIR,  gradient-echo, diffusion weighted MRI of the brain before and following  uneventful intravenous administration of gadolinium 13 mL ProHance    FINDINGS: No hydrocephalus. No mass effect or midline shift. No extra-axial  fluid collection. Minimal chronic microvascular ischemic disease for age. No  restricted diffusion to indicate acute infarct. Enhancing mass of the left cerebellopontine angle measures 1.8 x 1.2 x 1.5 cm  and extends into the left internal auditory canal. Mild mass effect on the left  cerebellar peduncle. No displacement of the carrie or medulla. The major intracranial vascular flow-voids are patent. The midline structures,  including the cervicomedullary junction, are within normal limits. Impression IMPRESSION:    1. Minimal chronic microvascular ischemic disease. No acute infarct. 2. 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 Mild mass effect on the left middle cerebellar peduncle. Physical Exam   Constitutional: She appears well-developed and well-nourished. No distress. HENT:   Head: Normocephalic and atraumatic. Psychiatric: She has a normal mood and affect. Her behavior is normal.  She is pleasantly mildly confused, I did not do a formal MMSE today however it was markedly abnormal in the past with a score of 18    ASSESSMENT and PLAN  DEMENTIA  Patient has an early fairly rapidly progressive Alzheimer type dementia. She is tolerating the Aricept 10 mg a day, I am going to try her on the memantine 1 more time this to see if she tolerates it, she did not tolerate it before. LEFT CP ANGLE TUMOR  I will repeat her MRI scan of the brain with contrast to follow this left CP angle tumor which is likely a schwannoma, I want to make sure it has not significantly increased in size.

## 2018-10-02 NOTE — PATIENT INSTRUCTIONS
Office Policies  o Phone calls/patient messages:  Please allow up to 24 hours for someone in the office to contact you about your call or message. Be mindful your provider may be out of the office or your message may require further review. We encourage you to use [employer] for your messages as this is a faster, more efficient way to communicate with our office  o Medication Refills:  Prescription medications require up to 48 business hours to process. We encourage you to use [employer] for your refills. For controlled medications: Please allow up to 72 business hours to process. Certain medications may require you to  a written prescription at our office. NO narcotic/controlled medications will be prescribed after 4pm Monday through Friday or on weekends  o Form/Paperwork Completion:  Please note there is a $25 fee for all paperwork completed by our providers. We ask that you allow 7-14 business days. Pre-payment is due prior to picking up/faxing the completed form. You may also download your forms to [employer] to have your doctor print off.  o Test Results: In order for a patient to obtain test results, an appointment must be made with the physician to review the results. Test results cannot be discussed over the phone.

## 2018-10-12 ENCOUNTER — HOSPITAL ENCOUNTER (OUTPATIENT)
Dept: MRI IMAGING | Age: 63
Discharge: HOME | End: 2018-10-12
Attending: PSYCHIATRY & NEUROLOGY
Payer: COMMERCIAL

## 2018-10-12 DIAGNOSIS — F02.80 ALZHEIMER'S DEMENTIA WITHOUT BEHAVIORAL DISTURBANCE, UNSPECIFIED TIMING OF DEMENTIA ONSET: ICD-10-CM

## 2018-10-12 DIAGNOSIS — G30.9 ALZHEIMER'S DEMENTIA WITHOUT BEHAVIORAL DISTURBANCE, UNSPECIFIED TIMING OF DEMENTIA ONSET: ICD-10-CM

## 2018-10-12 PROCEDURE — 70553 MRI BRAIN STEM W/O & W/DYE: CPT

## 2018-10-12 PROCEDURE — 74011250636 HC RX REV CODE- 250/636: Performed by: PSYCHIATRY & NEUROLOGY

## 2018-10-12 PROCEDURE — A9575 INJ GADOTERATE MEGLUMI 0.1ML: HCPCS | Performed by: PSYCHIATRY & NEUROLOGY

## 2018-10-12 RX ORDER — GADOTERATE MEGLUMINE 376.9 MG/ML
15 INJECTION INTRAVENOUS
Status: COMPLETED | OUTPATIENT
Start: 2018-10-12 | End: 2018-10-12

## 2018-10-12 RX ADMIN — GADOTERATE MEGLUMINE 11 ML: 376.9 INJECTION INTRAVENOUS at 16:28

## 2018-10-25 ENCOUNTER — OFFICE VISIT (OUTPATIENT)
Dept: FAMILY MEDICINE CLINIC | Age: 63
End: 2018-10-25

## 2018-10-25 VITALS
HEART RATE: 55 BPM | BODY MASS INDEX: 22.82 KG/M2 | WEIGHT: 142 LBS | OXYGEN SATURATION: 97 % | RESPIRATION RATE: 18 BRPM | SYSTOLIC BLOOD PRESSURE: 146 MMHG | DIASTOLIC BLOOD PRESSURE: 84 MMHG | TEMPERATURE: 98.1 F | HEIGHT: 66 IN

## 2018-10-25 DIAGNOSIS — E78.5 HYPERLIPIDEMIA, UNSPECIFIED HYPERLIPIDEMIA TYPE: ICD-10-CM

## 2018-10-25 DIAGNOSIS — G30.0 EARLY ONSET ALZHEIMER'S DEMENTIA WITHOUT BEHAVIORAL DISTURBANCE (HCC): Primary | ICD-10-CM

## 2018-10-25 DIAGNOSIS — D05.12 DUCTAL CARCINOMA IN SITU (DCIS) OF LEFT BREAST: ICD-10-CM

## 2018-10-25 DIAGNOSIS — F02.80 EARLY ONSET ALZHEIMER'S DEMENTIA WITHOUT BEHAVIORAL DISTURBANCE (HCC): Primary | ICD-10-CM

## 2018-10-25 DIAGNOSIS — N94.19 DYSPAREUNIA DUE TO MEDICAL CONDITION IN FEMALE: ICD-10-CM

## 2018-10-25 DIAGNOSIS — D33.3 VESTIBULAR SCHWANNOMA (HCC): ICD-10-CM

## 2018-10-25 NOTE — PROGRESS NOTES
HPI:  63 y.o.  presents for follow up appointment. No hospital, ER or specialist visits since last primary care visit except as noted below. Patient Active Problem List    Diagnosis    Vestibular schwannoma ([facility] [address]     Left side MRI 2018. Denies problems with balance or hearing      Early onset Alzheimer's dementia without behavioral disturbance - not able to work or drive at this point. Going to see [facility] disability doctor in November.  Hyperlipidemia - last ate 5 hours ago, had labs drawn today    History of breast cancer - 2008         Past Medical History:   Diagnosis Date    Breast cancer ([address] 2008    Left    Cancer ([facility] [address]     Hyperlipidemia 6/30/2017    Memory disorder     Radiation therapy complication     Lt breast 2008--no chemo       Social History     Tobacco Use    Smoking status: Never Smoker    Smokeless tobacco: Never Used   Substance Use Topics    Alcohol use: No    Drug use: No       Outpatient Medications Marked as Taking for the 10/25/18 encounter (Office Visit) with Dayami Holt MD   Medication Sig Dispense Refill    memantine (NAMENDA) 5 mg tablet 1 p.o. daily for 30 days and then 1 p.o. twice daily. Indications: MODERATE TO SEVERE ALZHEIMER'S TYPE DEMENTIA 60 Tab 5    donepezil (ARICEPT) 10 mg tablet Take 1 Tab by mouth nightly. 30 Tab 5    pravastatin (PRAVACHOL) 10 mg tablet take 1 tablet by mouth at bedtime 30 Tab 6    aspirin delayed-release 81 mg tablet Take 1 Tab by mouth daily. 30 Tab 0       Allergies   Allergen Reactions    Pcn [Penicillins] Hives       ROS:  ROS negative except as per HPI.       PE:  Visit Vitals  /84 (BP 1 Location: Left arm, BP Patient Position: Sitting)   Pulse (!) 55   Temp 98.1 °F (36.7 °C) (Oral)   Resp 18   Ht 5' 6\" (1.676 m)   Wt 142 lb (64.4 kg)   SpO2 97%   BMI 22.92 kg/m²     Gen: alert, oriented, no acute distress  Ears: external auditory canals clear, TMs without erythema or effusion  Eyes: pupils equal round reactive to light, sclera clear, conjunctiva clear  Oral: moist mucus membranes, no oral lesions, no pharyngeal inflammation or exudate  Neck: symmetric normal sized thyroid, no carotid bruits, no jugular vein distention  Resp: no increase work of breathing, lungs clear to ausculation bilaterally, no wheezing, rales or rhonchi  CV: S1, S2 normal.  No murmurs, rubs, or gallops. Abd: soft, not tender, not distended. No hepatosplenomegaly. Normal bowel sounds. Neuro: cranial nerves intact, normal strength and movement in all extremities, reflexes and sensation intact and symmetric. Skin: no lesion or rash  Extremities: no cyanosis or edema      Assessment/Plan:    1. Hyperlipidemia, unspecified hyperlipidemia type  No changes in medications pending lab results. - METABOLIC PANEL, COMPREHENSIVE  - LIPID PANEL    2. Vestibular schwannoma ([facility] [address]  Continued follow up per neurology    3. Ductal carcinoma in situ (DCIS) of left breast  No signs of recurrent disease  - METABOLIC PANEL, COMPREHENSIVE  - CBC WITH AUTOMATED DIFF    4. Dyspareunia due to medical condition in female  S/p tamoxifen, must have been estrogen sensitive breast cancer, will not give estrogen supplement in this situation. Follow up with gyn.    5. Early onset Alzheimer's dementia without behavioral disturbance  conitnued follow up per neurology. - CBC WITH AUTOMATED DIFF  - TSH 3RD GENERATION      Health Maintenance reviewed - updated. Orders Placed This Encounter    METABOLIC PANEL, COMPREHENSIVE    LIPID PANEL    CBC WITH AUTOMATED DIFF    TSH 3RD GENERATION       Medications Discontinued During This Encounter   Medication Reason    donepezil (ARICEPT) 5 mg tablet Dose Adjustment       Current Outpatient Medications   Medication Sig Dispense Refill    memantine (NAMENDA) 5 mg tablet 1 p.o. daily for 30 days and then 1 p.o. twice daily.   Indications: MODERATE TO SEVERE ALZHEIMER'S TYPE DEMENTIA 60 Tab 5    donepezil (ARICEPT) 10 mg tablet Take 1 Tab by mouth nightly. 30 Tab 5    pravastatin (PRAVACHOL) 10 mg tablet take 1 tablet by mouth at bedtime 30 Tab 6    aspirin delayed-release 81 mg tablet Take 1 Tab by mouth daily. 30 Tab 0       Verbal and written instructions (see AVS) provided. Patient expresses understanding of diagnosis and treatment plan.

## 2018-10-25 NOTE — PROGRESS NOTES
Chief Complaint   Patient presents with    Altered mental status     follow-up    Cholesterol Problem     follow-up       Health Maintenance reviewed     1. Have you been to the ER, urgent care clinic since your last visit? Hospitalized since your last visit? 2. Have you seen or consulted any other health care providers outside of the [facility] since your last visit? Include any pap smears or colon screening.

## 2018-10-25 NOTE — PATIENT INSTRUCTIONS
Painful Sex: Care Instructions  Your Care Instructions    Painful sex can be caused by many things. You may have an injury, an infection, or a growth in your vagina. Or maybe you have muscle spasms. In some cases, the pain is caused by another medical condition, such as a spinal problem. Some medicines can cause dryness in the vagina. And as a woman gets older, her vagina gets drier. It may also narrow, shorten, and get stiffer. This dryness can make sex painful. Talk to your doctor about what might be causing your painful sex. Treatment may help. Follow-up care is a key part of your treatment and safety. Be sure to make and go to all appointments, and call your doctor if you are having problems. It's also a good idea to know your test results and keep a list of the medicines you take. How can you care for yourself at home? · Use a vaginal lubricant during sex. Examples are Astroglide, K-Y Jelly, and Wet Gel Lubricant. · Increase the time you and your partner spend touching each other before sex. This is called foreplay. · Try different positions for sex to find the most comfortable ones. · Ask your doctor about exercises to strengthen and relax your pelvic muscles. · Before sex, take a warm bath. This can relax you and reduce anxiety. · If your doctor prescribes any medicines, take them exactly as prescribed. Call your doctor if you think you are having a problem with your medicine. When should you call for help? Watch closely for changes in your health, and be sure to contact your doctor if you have any problems. Where can you learn more? Go to http://shelby-shanika.info/. Enter O947 in the search box to learn more about \"Painful Sex: Care Instructions. \"  Current as of: May 15, 2018  Content Version: 11.8  © 3089-6824 [employer]. Care instructions adapted under license by [employer] (which disclaims liability or warranty for this information).  If you have questions about a medical condition or this instruction, always ask your healthcare professional. [place] [address] any warranty or liability for your use of this information.

## 2018-10-25 NOTE — Clinical Note
Wanted to make sure you'fe aware of her history of breast cancer - DCIS with lumpectomy, XRT and tamoxifen in 2010

## 2018-10-26 LAB
ALBUMIN SERPL-MCNC: 4.8 G/DL (ref 3.6–4.8)
ALBUMIN/GLOB SERPL: 2 {RATIO} (ref 1.2–2.2)
ALP SERPL-CCNC: 44 IU/L (ref 39–117)
ALT SERPL-CCNC: 15 IU/L (ref 0–32)
AST SERPL-CCNC: 18 IU/L (ref 0–40)
BASOPHILS # BLD AUTO: 0 X10E3/UL (ref 0–0.2)
BASOPHILS NFR BLD AUTO: 1 %
BILIRUB SERPL-MCNC: 0.5 MG/DL (ref 0–1.2)
BUN SERPL-MCNC: 15 MG/DL (ref 8–27)
BUN/CREAT SERPL: 19 (ref 12–28)
CALCIUM SERPL-MCNC: 9.6 MG/DL (ref 8.7–10.3)
CHLORIDE SERPL-SCNC: 103 MMOL/L (ref 96–106)
CHOLEST SERPL-MCNC: 194 MG/DL (ref 100–199)
CO2 SERPL-SCNC: 26 MMOL/L (ref 20–29)
CREAT SERPL-MCNC: 0.81 MG/DL (ref 0.57–1)
EOSINOPHIL # BLD AUTO: 0.1 X10E3/UL (ref 0–0.4)
EOSINOPHIL NFR BLD AUTO: 2 %
ERYTHROCYTE [DISTWIDTH] IN BLOOD BY AUTOMATED COUNT: 13.6 % (ref 12.3–15.4)
GLOBULIN SER CALC-MCNC: 2.4 G/DL (ref 1.5–4.5)
GLUCOSE SERPL-MCNC: 92 MG/DL (ref 65–99)
HCT VFR BLD AUTO: 36 % (ref 34–46.6)
HDLC SERPL-MCNC: 74 MG/DL
HGB BLD-MCNC: 12.1 G/DL (ref 11.1–15.9)
IMM GRANULOCYTES # BLD: 0 X10E3/UL (ref 0–0.1)
IMM GRANULOCYTES NFR BLD: 0 %
INTERPRETATION, 910389: NORMAL
LDLC SERPL CALC-MCNC: 106 MG/DL (ref 0–99)
LYMPHOCYTES # BLD AUTO: 1.8 X10E3/UL (ref 0.7–3.1)
LYMPHOCYTES NFR BLD AUTO: 41 %
MCH RBC QN AUTO: 27.7 PG (ref 26.6–33)
MCHC RBC AUTO-ENTMCNC: 33.6 G/DL (ref 31.5–35.7)
MCV RBC AUTO: 82 FL (ref 79–97)
MONOCYTES # BLD AUTO: 0.2 X10E3/UL (ref 0.1–0.9)
MONOCYTES NFR BLD AUTO: 5 %
NEUTROPHILS # BLD AUTO: 2.2 X10E3/UL (ref 1.4–7)
NEUTROPHILS NFR BLD AUTO: 51 %
PLATELET # BLD AUTO: 276 X10E3/UL (ref 150–379)
POTASSIUM SERPL-SCNC: 4.3 MMOL/L (ref 3.5–5.2)
PROT SERPL-MCNC: 7.2 G/DL (ref 6–8.5)
RBC # BLD AUTO: 4.37 X10E6/UL (ref 3.77–5.28)
SODIUM SERPL-SCNC: 144 MMOL/L (ref 134–144)
TRIGL SERPL-MCNC: 69 MG/DL (ref 0–149)
TSH SERPL DL<=0.005 MIU/L-ACNC: 3.62 UIU/ML (ref 0.45–4.5)
VLDLC SERPL CALC-MCNC: 14 MG/DL (ref 5–40)
WBC # BLD AUTO: 4.4 X10E3/UL (ref 3.4–10.8)

## 2019-02-07 ENCOUNTER — OFFICE VISIT (OUTPATIENT)
Dept: NEUROLOGY | Age: 64
End: 2019-02-07

## 2019-02-07 VITALS
HEIGHT: 66 IN | BODY MASS INDEX: 22.34 KG/M2 | WEIGHT: 139 LBS | DIASTOLIC BLOOD PRESSURE: 74 MMHG | OXYGEN SATURATION: 98 % | HEART RATE: 70 BPM | SYSTOLIC BLOOD PRESSURE: 128 MMHG

## 2019-02-07 DIAGNOSIS — D33.3 RIGHT ACOUSTIC NEUROMA (HCC): ICD-10-CM

## 2019-02-07 DIAGNOSIS — F02.80 LATE ONSET ALZHEIMER'S DISEASE WITHOUT BEHAVIORAL DISTURBANCE (HCC): Primary | ICD-10-CM

## 2019-02-07 DIAGNOSIS — G30.1 LATE ONSET ALZHEIMER'S DISEASE WITHOUT BEHAVIORAL DISTURBANCE (HCC): Primary | ICD-10-CM

## 2019-02-07 RX ORDER — MEMANTINE HYDROCHLORIDE 10 MG/1
TABLET ORAL
Qty: 60 TAB | Refills: 5 | Status: SHIPPED | OUTPATIENT
Start: 2019-02-07 | End: 2019-08-18 | Stop reason: SDUPTHER

## 2019-02-07 NOTE — PATIENT INSTRUCTIONS
A Healthy Lifestyle: Care Instructions Your Care Instructions A healthy lifestyle can help you feel good, stay at a healthy weight, and have plenty of energy for both work and play. A healthy lifestyle is something you can share with your whole family. A healthy lifestyle also can lower your risk for serious health problems, such as high blood pressure, heart disease, and diabetes. You can follow a few steps listed below to improve your health and the health of your family. Follow-up care is a key part of your treatment and safety. Be sure to make and go to all appointments, and call your doctor if you are having problems. It's also a good idea to know your test results and keep a list of the medicines you take. How can you care for yourself at home? · Do not eat too much sugar, fat, or fast foods. You can still have dessert and treats now and then. The goal is moderation. · Start small to improve your eating habits. Pay attention to portion sizes, drink less juice and soda pop, and eat more fruits and vegetables. ? Eat a healthy amount of food. A 3-ounce serving of meat, for example, is about the size of a deck of cards. Fill the rest of your plate with vegetables and whole grains. ? Limit the amount of soda and sports drinks you have every day. Drink more water when you are thirsty. ? Eat at least 5 servings of fruits and vegetables every day. It may seem like a lot, but it is not hard to reach this goal. A serving or helping is 1 piece of fruit, 1 cup of vegetables, or 2 cups of leafy, raw vegetables. Have an apple or some carrot sticks as an afternoon snack instead of a candy bar. Try to have fruits and/or vegetables at every meal. 
· Make exercise part of your daily routine. You may want to start with simple activities, such as walking, bicycling, or slow swimming. Try to be active 30 to 60 minutes every day.  You do not need to do all 30 to 60 minutes all at once. For example, you can exercise 3 times a day for 10 or 20 minutes. Moderate exercise is safe for most people, but it is always a good idea to talk to your doctor before starting an exercise program. 
· Keep moving. Saji Cue the lawn, work in the garden, or Z2. Take the stairs instead of the elevator at work. · If you smoke, quit. People who smoke have an increased risk for heart attack, stroke, cancer, and other lung illnesses. Quitting is hard, but there are ways to boost your chance of quitting tobacco for good. ? Use nicotine gum, patches, or lozenges. ? Ask your doctor about stop-smoking programs and medicines. ? Keep trying. In addition to reducing your risk of diseases in the future, you will notice some benefits soon after you stop using tobacco. If you have shortness of breath or asthma symptoms, they will likely get better within a few weeks after you quit. · Limit how much alcohol you drink. Moderate amounts of alcohol (up to 2 drinks a day for men, 1 drink a day for women) are okay. But drinking too much can lead to liver problems, high blood pressure, and other health problems. Family health If you have a family, there are many things you can do together to improve your health. · Eat meals together as a family as often as possible. · Eat healthy foods. This includes fruits, vegetables, lean meats and dairy, and whole grains. · Include your family in your fitness plan. Most people think of activities such as jogging or tennis as the way to fitness, but there are many ways you and your family can be more active. Anything that makes you breathe hard and gets your heart pumping is exercise. Here are some tips: 
? Walk to do errands or to take your child to school or the bus. 
? Go for a family bike ride after dinner instead of watching TV. Where can you learn more? Go to http://shelby-shanika.info/. Enter X433 in the search box to learn more about \"A Healthy Lifestyle: Care Instructions. \" Current as of: September 11, 2018 Content Version: 11.9 © 9979-4240 [employer]. Care instructions adapted under license by [employer] (which disclaims liability or warranty for this information). If you have questions about a medical condition or this instruction, always ask your healthcare professional. [place] [address] any warranty or liability for your use of this information.

## 2019-02-07 NOTE — PROGRESS NOTES
HISTORY OF PRESENT ILLNESS Hayde Pabon is a 66 y.o. female. HPI Comments: Patient returns, her memory is markedly worse. 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  states that she now can do very little for herself. I was surprised she was able to work as long as she could, I think her coworkers were supporting her. She returns today with her daughter. She continues to slowly lose mental function. She is pleasant and knows where she is but has very poor recent memory. She was very intolerant of the donepezil and lost weight on it and was nauseated all the time. She is tolerating them amantadine 5 mg twice daily. She has no other complaints. The daughter says there are no other new symptoms besides sleeping more and a little more confusion about how to do things. Complete review of systems done all others negative. Current Outpatient Medications on File Prior to Visit Medication Sig Dispense Refill  memantine (NAMENDA) 5 mg tablet 1 p.o. daily for 30 days and then 1 p.o. twice daily. Indications: MODERATE TO SEVERE ALZHEIMER'S TYPE DEMENTIA 60 Tab 5  
 donepezil (ARICEPT) 10 mg tablet Take 1 Tab by mouth nightly. 30 Tab 5  pravastatin (PRAVACHOL) 10 mg tablet take 1 tablet by mouth at bedtime 30 Tab 6  
 aspirin delayed-release 81 mg tablet Take 1 Tab by mouth daily. 30 Tab 0 No current facility-administered medications on file prior to visit. Past Medical History:  
Diagnosis Date  Breast cancer ([facility] [address] 2008 Left  Cancer ([facility] [address]  Hyperlipidemia 6/30/2017  Memory disorder  Radiation therapy complication Lt breast 2008--no chemo Family History Problem Relation Age of Onset  Lung Disease Brother  Breast Cancer Sister [id number] Social History Tobacco Use  Smoking status: Never Smoker  Smokeless tobacco: Never Used Substance Use Topics  Alcohol use:  No  
  Drug use: No  
 
Patient Active Problem List  
Diagnosis Code  Ductal carcinoma in situ (DCIS) of left breast D05.12  
 Hyperlipidemia E78.5  Early onset Alzheimer's dementia without behavioral disturbance G30.0, F02.80  Vestibular schwannoma ([facility] [address] D33.3 /74   Pulse 70   Ht 5' 6\" (1.676 m)   Wt 63 kg (139 lb)   SpO2 98%   BMI 22.44 kg/m² MRI Results (most recent): 
Results from Hospital Encounter encounter on 10/12/18 MRI BRAIN W WO CONT Narrative Clinical indication: Alzheimer dementia, behavioral change, follow-up 
schwannoma. Technical factors: Diffusion imaging, axial T1-weighted pre and post 11 cc IV 
dotarem ,  T2-weighted and FLAIR, axial gradient echo temporal bone, axial and 
coronal T1-weighted temporal bone pre and postcontrast. Comparison November 8, 2017. Diffusion imaging does not show acute ischemic changes. There is no extra-axial 
fluid collection hemorrhage or shift. Ventricular size is prominent for age. Stable. Major flow voids in blood vessels at the base of the brain are present. Remote nonspecific white matter disease also unchanged. The  left schwannoma is increasing size maximal AP dimension at this time is 19 
mm, it was 12 no new lesion no enhancing lesion are seen. No other significant 
abnormality of the temporal bones. Impression IMPRESSION: No acute findings. 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 No 
other changes otherwise. Physical Exam  
Constitutional: She appears well-developed and well-nourished. No distress. HENT:  
Head: Normocephalic and atraumatic. Psychiatric: She has a normal mood and affect. She sits quietly and will answer simple questions. She is pleasantly mildly confused, I did not do a formal MMSE today however it was markedly abnormal in the past with a score of 18 ASSESSMENT and PLAN 
DEMENTIA Patient has an early fairly rapidly progressive Alzheimer type dementia. She is now taking memantine 5 mg twice daily, she was very intolerant of the donepezil. I am going to increase the memantine to 10 mg in the morning and 5 mg in the evening for 4 weeks and then up to 10 mg twice a day. I am not going to attempt to the donepezil back. LEFT CP ANGLE TUMOR The last MRI scan done revealed that acoustic neuroma has  increased a maximum of 7 cm in the AP measurement, the patient is still asymptomatic, I will simply plan to repeat her MRI scan in 1 year we would like to avoid any surgery on this lady. This note will not be viewable in [address]

## 2019-04-09 ENCOUNTER — HOSPITAL ENCOUNTER (OUTPATIENT)
Dept: MAMMOGRAPHY | Age: 64
Discharge: HOME | End: 2019-04-09
Attending: NURSE PRACTITIONER
Payer: COMMERCIAL

## 2019-04-09 DIAGNOSIS — Z12.39 SCREENING BREAST EXAMINATION: ICD-10-CM

## 2019-04-09 PROCEDURE — 77067 SCR MAMMO BI INCL CAD: CPT

## 2019-04-12 NOTE — PROGRESS NOTES
Who is her breast oncologist so I can send them these results? Her mammogram was inconclusive and she needs further imaging. I prefer her oncologist decide which imaging.

## 2019-04-15 NOTE — PROGRESS NOTES
Spoke to her  and notiifed him that Nisa Nguyen said \"Who is her breast oncologist so I can send them these results? Her mammogram was inconclusive and she needs further imaging. I prefer her oncologist decide which imaging. \"      He does not know who her oncologist is. BUT did say that they have an appointment Friday to obtain more imaging.

## 2019-04-16 ENCOUNTER — TELEPHONE (OUTPATIENT)
Dept: FAMILY MEDICINE CLINIC | Age: 64
End: 2019-04-16

## 2019-04-19 ENCOUNTER — HOSPITAL ENCOUNTER (OUTPATIENT)
Dept: MAMMOGRAPHY | Age: 64
Discharge: HOME | End: 2019-04-19
Attending: NURSE PRACTITIONER
Payer: COMMERCIAL

## 2019-04-19 ENCOUNTER — TELEPHONE (OUTPATIENT)
Dept: FAMILY MEDICINE CLINIC | Age: 64
End: 2019-04-19

## 2019-04-19 ENCOUNTER — HOSPITAL ENCOUNTER (OUTPATIENT)
Dept: ULTRASOUND IMAGING | Age: 64
Discharge: HOME | End: 2019-04-19
Attending: NURSE PRACTITIONER
Payer: COMMERCIAL

## 2019-04-19 DIAGNOSIS — R92.8 ABNORMAL MAMMOGRAM OF RIGHT BREAST: ICD-10-CM

## 2019-04-19 DIAGNOSIS — N63.13 MASS OF LOWER OUTER QUADRANT OF RIGHT BREAST: Primary | ICD-10-CM

## 2019-04-19 PROCEDURE — 77065 DX MAMMO INCL CAD UNI: CPT

## 2019-04-19 PROCEDURE — 76642 ULTRASOUND BREAST LIMITED: CPT

## 2019-04-19 NOTE — TELEPHONE ENCOUNTER
Future Appointments   Date Time Provider Steve Carole   4/19/2019  2:30 PM Chip Barry 2 [facility]   8/1/2019  3:00 PM Julian Hector MD [address] [place] [address]                         Last Appointment My Department:  2/7/2019    Please advise of refill below. Requested Prescriptions     Pending Prescriptions Disp Refills    donepezil (ARICEPT) 10 mg tablet 30 Tab 5     Sig: Take 1 Tab by mouth nightly.

## 2019-04-19 NOTE — PROGRESS NOTES
Dr. Alyson Potts spoke w/Ms. Justen Verito (history of dementia) and Mr. Justen Sellers (he is POA) concerning recommendation for ultrasound guided right breast biopsy of mass. Spoke w/Padmini, nurse @ NP [address] [place] [address] office, advising that a return call on Monday r/t order    Faxed biopsy request w/mammo/US report to NP [address] [place] [address] office - fax confirmation rec'd    Once order rec'd, will call  to confirm appointment.

## 2019-04-22 ENCOUNTER — TELEPHONE (OUTPATIENT)
Dept: MAMMOGRAPHY | Age: 64
End: 2019-04-22

## 2019-04-22 DIAGNOSIS — R92.8 ABNORMAL MAMMOGRAM: Primary | ICD-10-CM

## 2019-04-22 DIAGNOSIS — C50.912 MALIGNANT NEOPLASM OF LEFT FEMALE BREAST, UNSPECIFIED ESTROGEN RECEPTOR STATUS, UNSPECIFIED SITE OF BREAST (HCC): ICD-10-CM

## 2019-04-22 RX ORDER — DONEPEZIL HYDROCHLORIDE 10 MG/1
10 TABLET, FILM COATED ORAL
Qty: 30 TAB | Refills: 5 | Status: SHIPPED | OUTPATIENT
Start: 2019-04-22 | End: 2019-10-21 | Stop reason: SDUPTHER

## 2019-04-22 NOTE — TELEPHONE ENCOUNTER
Mr. Onesimo Daley called in advising that his sister will be bringing Ms. Cassy Murphy (History of dementia) - telephone consent received since Mr. Amara Bryant is POA and . Henny Galvan RN also spoke w/Mr. Amara Bryant.

## 2019-04-23 ENCOUNTER — HOSPITAL ENCOUNTER (OUTPATIENT)
Dept: ULTRASOUND IMAGING | Age: 64
Discharge: HOME | End: 2019-04-23
Attending: NURSE PRACTITIONER
Payer: COMMERCIAL

## 2019-04-23 ENCOUNTER — HOSPITAL ENCOUNTER (OUTPATIENT)
Dept: MAMMOGRAPHY | Age: 64
Discharge: HOME | End: 2019-04-23
Attending: NURSE PRACTITIONER
Payer: COMMERCIAL

## 2019-04-23 VITALS — RESPIRATION RATE: 20 BRPM

## 2019-04-23 DIAGNOSIS — N63.0 LUMP OR MASS IN BREAST: ICD-10-CM

## 2019-04-23 DIAGNOSIS — R92.8 ABNORMAL MAMMOGRAM: ICD-10-CM

## 2019-04-23 PROCEDURE — 88360 TUMOR IMMUNOHISTOCHEM/MANUAL: CPT

## 2019-04-23 PROCEDURE — 88377 M/PHMTRC ALYS ISHQUANT/SEMIQ: CPT

## 2019-04-23 PROCEDURE — 19083 BX BREAST 1ST LESION US IMAG: CPT

## 2019-04-23 PROCEDURE — 77065 DX MAMMO INCL CAD UNI: CPT

## 2019-04-23 PROCEDURE — 88305 TISSUE EXAM BY PATHOLOGIST: CPT

## 2019-04-23 NOTE — DISCHARGE INSTRUCTIONS
Instructions Following Your Breast Biopsy         Pain Control    · Tylenol should be taken as directed on the back of the bottle (annel 4-6 hours) for the next 24 hours post breast biopsy unless directed otherwise by a physician. · No Aspirin or Anti-Inflammatory  (Motrin, Advil ) medications for 24 hours. · Wearing a soft, comfortable (Wire free ) bra, even at night, for 24 hours is helpful. · Use a clean, covered ice pack over the site every 1- 2 hours til bedtime, for 20-30 minutes at a time for the first 24 hours. Biopsy Site     · A small amount of bleeding and /or oozing from the Biopsy site is normal, as well as bloody nipple discharge, which is rare. · You may notice bruising on the skin over the next few days. · Steri Strips and a dressing have been applied. · The steri strips can remain on for up to 5 days. · The clean dressing can be removed in 48 hours, however, if the dressing becomes loose, causes redness or irritation of the skin or any drainage has collected, please remove it an replace it with a Band-Aid. · Avoid getting the Biopsy site wet for 48 hours, avoid showering, swimming and hot tubs. · Should you have excessive bleeding or pain, please seek medical treatment or call                 [address] [employer] [phone], 7:30 - 4:00 p.m. After hours (ask to speak to the on-call Radiology Nurse-RN)                        [phone] or [phone]      Activity      · Rest the day of the biopsy, avoiding strenuous activities and any heavy lifting. · You may resume most activities/ work the following day. Pathology Report     · The results of your Pathology report, from the laboratory, should be available in 3-5 business days. The Radiologist will review your results and, based on your preference, we will be happy to provide results to you by phone or in person.   · You will also be advised, at that time, of any further appointments or follow- up you may need.

## 2019-04-23 NOTE — PROGRESS NOTES
Dr. Kevin Rubio present for pre procedure consult and consent (consent was signed w/POA  yesterday evening during precall) Pt's sister is with her today and acknowledges procedure- questions reviewed with understanding.

## 2019-04-24 NOTE — PROGRESS NOTES
Dr. Miles Blue reviewed pathology results and called results to patient's , Helen Guardado, r/t pt having dementia and he is her POA. Patient has an appointment with Dr. Deirdre Garcia on May 15, 2019 at 11:00 a.m. With arrival time of 1030 per Steven Mcclure at Dr. Pauly Eisenberg office. Pt to bring photo ID and insurance information to her appt. Pt.'s  given directions with suite number and telephone number for Dr. Delgado Sahu and nurses direct phone line ([phone]) for any further questions/concerns he may have.

## 2019-04-25 ENCOUNTER — TELEPHONE (OUTPATIENT)
Dept: MAMMOGRAPHY | Age: 64
End: 2019-04-25

## 2019-04-25 NOTE — TELEPHONE ENCOUNTER
Pt.'s  called to inquire about pts appt. On 5/15/2019 with Dr. Yang Carrera. He was inquiring what would be taking place at the appt. Explained to Mr. Kennedyeffie Mauriciorio that Dr. Yoli Bowling was a breast surgeon and she would be reviewing with him and his wife the options of care following her biopsy pathology. . Timothy Lassiter thanked me for explaining about the appt. And stated \"after I heard the words cancer yesterday-everything was a blur\".

## 2019-04-26 DIAGNOSIS — E78.5 HYPERLIPIDEMIA, UNSPECIFIED HYPERLIPIDEMIA TYPE: ICD-10-CM

## 2019-04-26 RX ORDER — PRAVASTATIN SODIUM 10 MG/1
TABLET ORAL
Qty: 30 TAB | Refills: 6 | Status: SHIPPED | OUTPATIENT
Start: 2019-04-26 | End: 2019-11-25 | Stop reason: SDUPTHER

## 2019-05-15 ENCOUNTER — OFFICE VISIT (OUTPATIENT)
Dept: SURGERY | Age: 64
End: 2019-05-15

## 2019-05-15 VITALS
DIASTOLIC BLOOD PRESSURE: 92 MMHG | HEIGHT: 66 IN | WEIGHT: 134 LBS | SYSTOLIC BLOOD PRESSURE: 152 MMHG | BODY MASS INDEX: 21.53 KG/M2

## 2019-05-15 DIAGNOSIS — Z85.3 HISTORY OF LEFT BREAST CANCER: Primary | ICD-10-CM

## 2019-05-15 DIAGNOSIS — Z80.3 FAMILY HISTORY OF BREAST CANCER: ICD-10-CM

## 2019-05-15 DIAGNOSIS — C50.911 MALIGNANT NEOPLASM OF RIGHT BREAST IN FEMALE, ESTROGEN RECEPTOR POSITIVE, UNSPECIFIED SITE OF BREAST (HCC): ICD-10-CM

## 2019-05-15 DIAGNOSIS — Z17.0 MALIGNANT NEOPLASM OF RIGHT BREAST IN FEMALE, ESTROGEN RECEPTOR POSITIVE, UNSPECIFIED SITE OF BREAST (HCC): ICD-10-CM

## 2019-05-15 NOTE — PROGRESS NOTES
HISTORY OF PRESENT ILLNESS  Landy Lazar is a 66 y.o. female. HPI NEW patient consult referred by  Isabel Springer NP for new RIGHT breast cancer. This was found on screening mammogram, no palpable lumps, nipple drainage/inversion or skin dimpling, according to her , who is here with her as she has dementia. She has a history of LEFT breast cancer, 2008, having lumpectomy, chemo and XRT at that time. She has progressive alzheimer's dementia. Menarche 17, LMP unknown, # of children 1, age of 1st delivery 14, Hysterectomy/oophorectomy yes/yes, Breast bx yes-LEFT 2008, breast cancer, history of breast feeding no, BCP no, Hormone therapy no      Family History:  Sister, breast cancer, age 43, survivor    JAVIER Results (most recent):  Results from [place] encounter on 04/23/19   JAVIER POST BX IMAGING RT INCL CAD    Addendum Addendum: ADDENDUM TO ultrasound guided right breast biopsy performed  4/23/2019. PATHOLOGY RESULTS: Invasive ductal carcinoma. RESULTS CONVEYED: Patient's  informed by Dr. Jori Gamez 4/24/2019. ASSESSMENT: These results are concordant with the imaging findings. RECOMMENDATIONS: Patient has been provided a surgical consultation  appointment with Dr. Omer Richards 5/15/2019. Maddie Garcia MD 4/24/2019  3:33 PM          Narrative STUDY:  ULTRASOUND-GUIDED CORE NEEDLE BIOPSY OF THE RIGHT BREAST    INDICATION: Right breast mass. PROCEDURE:  The risks and benefits of the procedure were explained to the patient, questions  were answered, and informed consent was obtained. The mass at 8-not o'clock in the right breast was localized with ultrasound. The breast was prepped in the usual sterile manner. Following the  administration of a local anesthetic and dermatotomy, and using ultrasound  guidance, 12 gauge vacuum-assisted Celero needle was advanced to the lesion, and  3 cores were obtained. Pre and post-fire images confirmed accurate needle  trajectory.    A metallic clip was placed at the biopsy site. Post-biopsy  digital mammogram confirms the presence of the clip at the intended biopsy site. The patient tolerated the procedure well without complication. Final pathology is pending. Impression IMPRESSION:   Successful right breast ultrasound-guided biopsy. US Results (most recent):  [facility]   US 89 Sasha Cheatham RT 1ST LESION W/CLIP AND SPECIMEN    Addendum Addendum: ADDENDUM TO ultrasound guided right breast biopsy performed  4/23/2019. PATHOLOGY RESULTS: Invasive ductal carcinoma. RESULTS CONVEYED: Patient's  informed by Dr. Linda Neil 4/24/2019. ASSESSMENT: These results are concordant with the imaging findings. RECOMMENDATIONS: Patient has been provided a surgical consultation  appointment with Dr. Ernesto Elias 5/15/2019. Karyle Aden, MD 4/24/2019  3:33 PM          Narrative STUDY:  ULTRASOUND-GUIDED CORE NEEDLE BIOPSY OF THE RIGHT BREAST    INDICATION: Right breast mass. PROCEDURE:  The risks and benefits of the procedure were explained to the patient, questions  were answered, and informed consent was obtained. The mass at 8-not o'clock in the right breast was localized with ultrasound. The breast was prepped in the usual sterile manner. Following the  administration of a local anesthetic and dermatotomy, and using ultrasound  guidance, 12 gauge vacuum-assisted Celero needle was advanced to the lesion, and  3 cores were obtained. Pre and post-fire images confirmed accurate needle  trajectory. A metallic clip was placed at the biopsy site. Post-biopsy  digital mammogram confirms the presence of the clip at the intended biopsy site. The patient tolerated the procedure well without complication. Final pathology is pending. Impression IMPRESSION:   Successful right breast ultrasound-guided biopsy.          FINAL PATHOLOGIC DIAGNOSIS   Right breast, mass, ultrasound-guided needle core biopsy:   Invasive ductal carcinoma, nuclear grade 2, present within two cores up to 8 mm in greatest individual   length     ESTROGEN AND PROGESTERONE RECEPTOR ASSAY     Interpretation   Invasive Component   Estrogen receptor 100% strong staining POSITIVE   Progesterone receptor Pending - repeating due to technical issue   In-situ Non-invasive Component   Estrogen receptor Not Applicable   Progesterone receptor Not Applicable     Interpretation   Progesterone Receptor Assay IHC: 0% staining NEGATIVE           Her2 Breast Date Ordered: 5/6/2019 Status: Signed Out   Date Complete: 5/6/2019 By: Jose Guzman   Date Reported: 5/6/2019   Interpretation   FLUORESCENT IN SITU HYBRIDIZATION - HER2 Breast   Results: Negative     Past Medical History:   Diagnosis Date    [facility] 2008    Left    Cancer ([facility]     Hyperlipidemia 6/30/2017    Memory disorder     Radiation therapy complication     Lt breast 2008--no chemo     Past Surgical History:   Procedure Laterality Date    HX BREAST LUMPECTOMY Left 2008    JAVIER US BX BREAST LT 1ST LESION W/CLIP AND SPECIMEN Left 2008    multiple areas sampled--one area breast CA     Social History     Socioeconomic History    Marital status:      Spouse name: Not on file    Number of children: Not on file    Years of education: Not on file    Highest education level: Not on file   Occupational History    Not on file   Social Needs    Financial resource strain: Not on file    Food insecurity:     Worry: Not on file     Inability: Not on file    Transportation needs:     Medical: Not on file     Non-medical: Not on file   Tobacco Use    Smoking status: Never Smoker    Smokeless tobacco: Never Used   Substance and Sexual Activity    Alcohol use: No    Drug use: No    Sexual activity: Not on file   Lifestyle    Physical activity:     Days per week: Not on file     Minutes per session: Not on file    Stress: Not on file   Relationships    Social connections:     Talks on phone: Not on file     Gets together: Not on file     Attends Hindu service: Not on file     Active member of club or organization: Not on file     Attends meetings of clubs or organizations: Not on file     Relationship status: Not on file    Intimate partner violence:     Fear of current or ex partner: Not on file     Emotionally abused: Not on file     Physically abused: Not on file     Forced sexual activity: Not on file   Other Topics Concern    Not on file   Social History Narrative    Not on file     OB History    None      Obstetric Comments   Menarche 17, LMP unknown, # of children 1, age of 4st delivery 14, Hysterectomy/oophorectomy yes/yes, Breast bx yes-LEFT 2008, breast cancer, history of breast feeding no, BCP no, Hormone therapy no             Current Outpatient Medications:     pravastatin (PRAVACHOL) 10 mg tablet, take 1 tablet by mouth at bedtime, Disp: 30 Tab, Rfl: 6    memantine (NAMENDA) 10 mg tablet, 1 po bid, Disp: 60 Tab, Rfl: 5    aspirin delayed-release 81 mg tablet, Take 1 Tab by mouth daily. , Disp: 30 Tab, Rfl: 0    donepezil (ARICEPT) 10 mg tablet, Take 1 Tab by mouth nightly., Disp: 30 Tab, Rfl: 5    memantine (NAMENDA) 5 mg tablet, 1 p.o. daily for 30 days and then 1 p.o. twice daily. Indications: MODERATE TO SEVERE ALZHEIMER'S TYPE DEMENTIA, Disp: 60 Tab, Rfl: 5  Allergies   Allergen Reactions    Pcn [Penicillins] Hives       Review of Systems   Psychiatric/Behavioral: Positive for memory loss. All other systems reviewed and are negative. Physical Exam   Constitutional: She is oriented to person, place, and time. She appears well-developed and well-nourished. HENT:   Head: Normocephalic. Eyes: EOM are normal.   Neck: Neck supple. Cardiovascular: Intact distal pulses. Pulmonary/Chest: Effort normal. Right breast exhibits mass (8:00 1 cm mass).  Right breast exhibits no inverted nipple, no nipple discharge, no skin change and no tenderness. Left breast exhibits no inverted nipple, no mass, no nipple discharge, no skin change and no tenderness. Breasts are symmetrical.       Abdominal: Soft. Musculoskeletal: Normal range of motion. Lymphadenopathy:     She has no cervical adenopathy. She has no axillary adenopathy. Neurological: She is alert and oriented to person, place, and time. Skin: Skin is warm and dry. Psychiatric: She has a normal mood and affect. Nursing note and vitals reviewed. BREAST ULTRASOUND, Preop planning  Indication:preop planning  right Breast 8:00   Technique: The area was scanned using a high-frequency linear-array near-field transducer  Findings: 1.2 cm  irregular mass with dropout  Impression: Biopsy site visible with ultrasound  Disposition:  Will schedule lumpectomy with sentinel lymph node biopsy  ASSESSMENT and PLAN    ICD-10-CM ICD-9-CM    1. History of left breast cancer Z85.3 V10.3 BRAC-ANALYSIS   2. Malignant neoplasm of right breast in female, estrogen receptor positive, unspecified site [facility] C50.911 174.9 BRAC-ANALYSIS    Z17.0 V86.0    3. Family history of breast cancer Z80.3 V16.3 BRAC-ANALYSIS     67 yo female with right breast T1 N0 IDC Er/Pr + Her 2 arvind negative. She is here with her family. I have reviewed the imaging and pathology with her and she was given copies of these reports. 90 minutes were spent face-to-face with the patient during this encounter and 90% of that time was spent on counseling and coordination of care. 1. Discussed lumpectomy and radiation vs mastectomy. Discussed reconstruction. 2. Discussed sentinel lymph node biopsy. 3. Discussed external beam radiation. 4. Discussed hormone therapy. 5. Discussed the possibility of chemotherapy. Given her history of breast cancer and family history will send genetic testing.    She has rapidly progressive dementia and I would like for her to see her neurologist to determine how this is doing and if ok to use general anesthesia or not. If not just do us guided lumpectomy. The family was in agreement with this plan. If surgery is not advised at all could do hormone blockade. They prefer surgery. Will schedule surgery once she has seen Dr. Roberto Laurent.

## 2019-05-15 NOTE — LETTER
5/16/2019 9:32 AM 
 
Patient:  Roe Milton YOB: 1955 Date of Visit: 5/15/2019 Dear Imelda Meng NP 
[address] [facility] [address] VIA In Basket 
 : Thank you for referring Ms. James De La Cruz to me for evaluation/treatment. Below are the relevant portions of my assessment and plan of care. HISTORY OF PRESENT ILLNESS Roe Milton is a 66 y.o. female. HPI NEW patient consult referred by  Bernard Nam NP for new RIGHT breast cancer. This was found on screening mammogram, no palpable lumps, nipple drainage/inversion or skin dimpling, according to her , who is here with her as she has dementia. She has a history of LEFT breast cancer, 2008, having lumpectomy, chemo and XRT at that time. She has progressive alzheimer's dementia. Menarche 17, LMP unknown, # of children 1, age of 4st delivery 14, Hysterectomy/oophorectomy yes/yes, Breast bx yes-LEFT 2008, breast cancer, history of breast feeding no, BCP no, Hormone therapy no Family History: 
Sister, breast cancer, age 43, survivor JAVIER Results (most recent): 
Results from [facility] encounter on 04/23/19 JAVIER POST BX IMAGING RT INCL CAD Addendum Addendum: ADDENDUM TO ultrasound guided right breast biopsy performed  4/23/2019. PATHOLOGY RESULTS: Invasive ductal carcinoma. RESULTS CONVEYED: Patient's  informed by Dr. Claudio Cameron 4/24/2019. ASSESSMENT: These results are concordant with the imaging findings. RECOMMENDATIONS: Patient has been provided a surgical consultation  appointment with Dr. Kaylynn Diaz 5/15/2019. Telma Verdugo MD 4/24/2019  3:33 PM        
 Narrative STUDY:  ULTRASOUND-GUIDED CORE NEEDLE BIOPSY OF THE RIGHT BREAST INDICATION: Right breast mass. PROCEDURE: The risks and benefits of the procedure were explained to the patient, questions 
were answered, and informed consent was obtained. The mass at 8-not o'clock in the right breast was localized with ultrasound. The breast was prepped in the usual sterile manner. Following the 
administration of a local anesthetic and dermatotomy, and using ultrasound 
guidance, 12 gauge vacuum-assisted Celero needle was advanced to the lesion, and 
3 cores were obtained. Pre and post-fire images confirmed accurate needle 
trajectory. A metallic clip was placed at the biopsy site. Post-biopsy 
digital mammogram confirms the presence of the clip at the intended biopsy site. The patient tolerated the procedure well without complication. Final pathology is pending. Impression IMPRESSION:  
Successful right breast ultrasound-guided biopsy. US Results (most recent): 
Results from [place] encounter on 04/23/19 US BX BREAST VAC RT 1ST LESION W/CLIP AND SPECIMEN Addendum Addendum: ADDENDUM TO ultrasound guided right breast biopsy performed  4/23/2019. PATHOLOGY RESULTS: Invasive ductal carcinoma. RESULTS CONVEYED: Patient's  informed by Dr. Sanjay Roth 4/24/2019. ASSESSMENT: These results are concordant with the imaging findings. RECOMMENDATIONS: Patient has been provided a surgical consultation  appointment with Dr. Anna Bryant 5/15/2019. Leeanna Villafuerte MD 4/24/2019  3:33 PM        
 Narrative STUDY:  ULTRASOUND-GUIDED CORE NEEDLE BIOPSY OF THE RIGHT BREAST INDICATION: Right breast mass. PROCEDURE: The risks and benefits of the procedure were explained to the patient, questions 
were answered, and informed consent was obtained. The mass at 8-not o'clock in the right breast was localized with ultrasound. The breast was prepped in the usual sterile manner. Following the 
administration of a local anesthetic and dermatotomy, and using ultrasound 
guidance, 12 gauge vacuum-assisted Celero needle was advanced to the lesion, and 
3 cores were obtained. Pre and post-fire images confirmed accurate needle trajectory. A metallic clip was placed at the biopsy site. Post-biopsy 
digital mammogram confirms the presence of the clip at the intended biopsy site. The patient tolerated the procedure well without complication. Final pathology is pending. Impression IMPRESSION:  
Successful right breast ultrasound-guided biopsy. FINAL PATHOLOGIC DIAGNOSIS Right breast, mass, ultrasound-guided needle core biopsy:  
Invasive ductal carcinoma, nuclear grade 2, present within two cores up to 8 mm in greatest individual  
length ESTROGEN AND PROGESTERONE RECEPTOR ASSAY Interpretation Invasive Component Estrogen receptor 100% strong staining POSITIVE Progesterone receptor Pending  repeating due to technical issue In-situ Non-invasive Component Estrogen receptor Not Applicable Progesterone receptor Not Applicable Interpretation Progesterone Receptor Assay IHC: 0% staining NEGATIVE Her2 Breast Date Ordered: 5/6/2019 Status: Signed Out Date Complete: 5/6/2019 By: Lashay Ibarra Date Reported: 5/6/2019 Interpretation FLUORESCENT IN SITU HYBRIDIZATION - HER2 Breast  
Results: Negative Past Medical History:  
Diagnosis Date  Breast cancer ([facility] [address] 2008 Left  Cancer ([facility]  Hyperlipidemia 6/30/2017  Memory disorder  Radiation therapy complication Lt breast 2008--no chemo Past Surgical History:  
Procedure Laterality Date  HX BREAST LUMPECTOMY Left 2008  JAVIER US BX BREAST LT 1ST LESION W/CLIP AND SPECIMEN Left 2008  
 multiple areas sampled--one area breast CA Social History Socioeconomic History  Marital status:  Spouse name: Not on file  Number of children: Not on file  Years of education: Not on file  Highest education level: Not on file Occupational History  Not on file Social Needs  Financial resource strain: Not on file  Food insecurity:  
  Worry: Not on file Inability: Not on file  Transportation needs:  
  Medical: Not on file Non-medical: Not on file Tobacco Use  Smoking status: Never Smoker  Smokeless tobacco: Never Used Substance and Sexual Activity  Alcohol use: No  
 Drug use: No  
 Sexual activity: Not on file Lifestyle  Physical activity:  
  Days per week: Not on file Minutes per session: Not on file  Stress: Not on file Relationships  Social connections:  
  Talks on phone: Not on file Gets together: Not on file Attends Advent service: Not on file Active member of club or organization: Not on file Attends meetings of clubs or organizations: Not on file Relationship status: Not on file  Intimate partner violence:  
  Fear of current or ex partner: Not on file Emotionally abused: Not on file Physically abused: Not on file Forced sexual activity: Not on file Other Topics Concern  Not on file Social History Narrative  Not on file OB History None Obstetric Comments Menarche 17, LMP unknown, # of children 1, age of 4st delivery 14, Hysterectomy/oophorectomy yes/yes, Breast bx yes-LEFT 2008, breast cancer, history of breast feeding no, BCP no, Hormone therapy no Current Outpatient Medications:  
  pravastatin (PRAVACHOL) 10 mg tablet, take 1 tablet by mouth at bedtime, Disp: 30 Tab, Rfl: 6 
  memantine (NAMENDA) 10 mg tablet, 1 po bid, Disp: 60 Tab, Rfl: 5 
  aspirin delayed-release 81 mg tablet, Take 1 Tab by mouth daily. , Disp: 30 Tab, Rfl: 0 
  donepezil (ARICEPT) 10 mg tablet, Take 1 Tab by mouth nightly., Disp: 30 Tab, Rfl: 5 
  memantine (NAMENDA) 5 mg tablet, 1 p.o. daily for 30 days and then 1 p.o. twice daily. Indications: MODERATE TO SEVERE ALZHEIMER'S TYPE DEMENTIA, Disp: 60 Tab, Rfl: 5 Allergies Allergen Reactions  Pcn [Penicillins] Hives Review of Systems Psychiatric/Behavioral: Positive for memory loss. All other systems reviewed and are negative. Physical Exam  
Constitutional: She is oriented to person, place, and time. She appears well-developed and well-nourished. HENT:  
Head: Normocephalic. Eyes: EOM are normal.  
Neck: Neck supple. Cardiovascular: Intact distal pulses. Pulmonary/Chest: Effort normal. Right breast exhibits mass (8:00 1 cm mass). Right breast exhibits no inverted nipple, no nipple discharge, no skin change and no tenderness. Left breast exhibits no inverted nipple, no mass, no nipple discharge, no skin change and no tenderness. Breasts are symmetrical.  
 
 
Abdominal: Soft. Musculoskeletal: Normal range of motion. Lymphadenopathy:  
  She has no cervical adenopathy. She has no axillary adenopathy. Neurological: She is alert and oriented to person, place, and time. Skin: Skin is warm and dry. Psychiatric: She has a normal mood and affect. Nursing note and vitals reviewed. BREAST ULTRASOUND, Preop planning Indication:preop planning  right Breast 8:00 Technique: The area was scanned using a high-frequency linear-array near-field transducer Findings: 1.2 cm  irregular mass with dropout Impression: Biopsy site visible with ultrasound Disposition:  Will schedule lumpectomy with sentinel lymph node biopsy ASSESSMENT and PLAN 
  ICD-10-CM ICD-9-CM 1. History of left breast cancer Z85.3 V10.3 BRAC-ANALYSIS 2. Malignant neoplasm of right breast in female, estrogen receptor positive, unspecified site [facility] C50.911 174.9 BRAC-ANALYSIS  
 Z17.0 V86.0 3. Family history of breast cancer Z80.3 V16.3 BRAC-ANALYSIS  
 
65 yo female with right breast T1 N0 IDC Er/Pr + Her 2 arvind negative. She is here with her family. I have reviewed the imaging and pathology with her and she was given copies of these reports.  
 
90 minutes were spent face-to-face with the patient during this encounter and 90% of that time was spent on counseling and coordination of care. 1. Discussed lumpectomy and radiation vs mastectomy. Discussed reconstruction. 2. Discussed sentinel lymph node biopsy. 3. Discussed external beam radiation. 4. Discussed hormone therapy. 5. Discussed the possibility of chemotherapy. Given her history of breast cancer and family history will send genetic testing. She has rapidly progressive dementia and I would like for her to see her neurologist to determine how this is doing and if ok to use general anesthesia or not. If not just do us guided lumpectomy. The family was in agreement with this plan. If surgery is not advised at all could do hormone blockade. They prefer surgery. Will schedule surgery once she has seen Dr. Sisi Barber. If you have questions, please do not hesitate to call me. I look forward to following Ms. Jaime Dillon along with you. Sincerely, Hugh Meng MD

## 2019-05-15 NOTE — Clinical Note
Hi! Can you please see her preop? Has breast cancer and needs lumpectomyNeed to know how anesthesia will effect her and How progressive her dementia is. thanks

## 2019-05-15 NOTE — PATIENT INSTRUCTIONS

## 2019-05-16 NOTE — COMMUNICATION BODY
HISTORY OF PRESENT ILLNESS  Edita Parrish is a 66 y.o. female. HPI NEW patient consult referred by  Leslie Mcbride NP for new RIGHT breast cancer. This was found on screening mammogram, no palpable lumps, nipple drainage/inversion or skin dimpling, according to her , who is here with her as she has dementia. She has a history of LEFT breast cancer, 2008, having lumpectomy, chemo and XRT at that time. She has progressive alzheimer's dementia. Menarche 17, LMP unknown, # of children 1, age of 1st delivery 14, Hysterectomy/oophorectomy yes/yes, Breast bx yes-LEFT 2008, breast cancer, history of breast feeding no, BCP no, Hormone therapy no      Family History:  Sister, breast cancer, age 43, survivor    JAVIER Results (most recent):  Results from [place] encounter on 04/23/19   JAVIER POST BX IMAGING RT INCL CAD    Addendum Addendum: ADDENDUM TO ultrasound guided right breast biopsy performed  4/23/2019. PATHOLOGY RESULTS: Invasive ductal carcinoma. RESULTS CONVEYED: Patient's  informed by Dr. Krish Anderson 4/24/2019. ASSESSMENT: These results are concordant with the imaging findings. RECOMMENDATIONS: Patient has been provided a surgical consultation  appointment with Dr. Delia Cano 5/15/2019. Deven Samuels MD 4/24/2019  3:33 PM          Narrative STUDY:  ULTRASOUND-GUIDED CORE NEEDLE BIOPSY OF THE RIGHT BREAST    INDICATION: Right breast mass. PROCEDURE:  The risks and benefits of the procedure were explained to the patient, questions  were answered, and informed consent was obtained. The mass at 8-not o'clock in the right breast was localized with ultrasound. The breast was prepped in the usual sterile manner. Following the  administration of a local anesthetic and dermatotomy, and using ultrasound  guidance, 12 gauge vacuum-assisted Celero needle was advanced to the lesion, and  3 cores were obtained. Pre and post-fire images confirmed accurate needle  trajectory.    A metallic clip was placed at the biopsy site. Post-biopsy  digital mammogram confirms the presence of the clip at the intended biopsy site. The patient tolerated the procedure well without complication. Final pathology is pending. Impression IMPRESSION:   Successful right breast ultrasound-guided biopsy. US Results (most recent):  Results from [facility] encounter on 04/23/19   US 89 Sasha Cheatham RT 1ST LESION W/CLIP AND SPECIMEN    Addendum Addendum: ADDENDUM TO ultrasound guided right breast biopsy performed  4/23/2019. PATHOLOGY RESULTS: Invasive ductal carcinoma. RESULTS CONVEYED: Patient's  informed by Dr. Mable Millard 4/24/2019. ASSESSMENT: These results are concordant with the imaging findings. RECOMMENDATIONS: Patient has been provided a surgical consultation  appointment with Dr. Perfecto Orozco 5/15/2019. Thanh Castro MD 4/24/2019  3:33 PM          Narrative STUDY:  ULTRASOUND-GUIDED CORE NEEDLE BIOPSY OF THE RIGHT BREAST    INDICATION: Right breast mass. PROCEDURE:  The risks and benefits of the procedure were explained to the patient, questions  were answered, and informed consent was obtained. The mass at 8-not o'clock in the right breast was localized with ultrasound. The breast was prepped in the usual sterile manner. Following the  administration of a local anesthetic and dermatotomy, and using ultrasound  guidance, 12 gauge vacuum-assisted Celero needle was advanced to the lesion, and  3 cores were obtained. Pre and post-fire images confirmed accurate needle  trajectory. A metallic clip was placed at the biopsy site. Post-biopsy  digital mammogram confirms the presence of the clip at the intended biopsy site. The patient tolerated the procedure well without complication. Final pathology is pending. Impression IMPRESSION:   Successful right breast ultrasound-guided biopsy.          FINAL PATHOLOGIC DIAGNOSIS   Right breast, mass, ultrasound-guided needle core biopsy:   Invasive ductal carcinoma, nuclear grade 2, present within two cores up to 8 mm in greatest individual   length     ESTROGEN AND PROGESTERONE RECEPTOR ASSAY     Interpretation   Invasive Component   Estrogen receptor 100% strong staining POSITIVE   Progesterone receptor Pending - repeating due to technical issue   In-situ Non-invasive Component   Estrogen receptor Not Applicable   Progesterone receptor Not Applicable     Interpretation   Progesterone Receptor Assay IHC: 0% staining NEGATIVE           Her2 Breast Date Ordered: 5/6/2019 Status: Signed Out   Date Complete: 5/6/2019 By: Kely Gerber   Date Reported: 5/6/2019   Interpretation   FLUORESCENT IN SITU HYBRIDIZATION - HER2 Breast   Results: Negative     Past Medical History:   Diagnosis Date    Breast [facility] [address] 2008    Left    Cancer ([facility] [address]     Hyperlipidemia 6/30/2017    Memory disorder     Radiation therapy complication     Lt breast 2008--no chemo     Past Surgical History:   Procedure Laterality Date    HX BREAST LUMPECTOMY Left 2008    JAVIER US BX BREAST LT 1ST LESION W/CLIP AND SPECIMEN Left 2008    multiple areas sampled--one area breast CA     Social History     Socioeconomic History    Marital status:      Spouse name: Not on file    Number of children: Not on file    Years of education: Not on file    Highest education level: Not on file   Occupational History    Not on file   Social Needs    Financial resource strain: Not on file    Food insecurity:     Worry: Not on file     Inability: Not on file    Transportation needs:     Medical: Not on file     Non-medical: Not on file   Tobacco Use    Smoking status: Never Smoker    Smokeless tobacco: Never Used   Substance and Sexual Activity    Alcohol use: No    Drug use: No    Sexual activity: Not on file   Lifestyle    Physical activity:     Days per week: Not on file     Minutes per session: Not on file    Stress: Not on file   Relationships    Social connections:     Talks on phone: Not on file     Gets together: Not on file     Attends Anabaptist service: Not on file     Active member of club or organization: Not on file     Attends meetings of clubs or organizations: Not on file     Relationship status: Not on file    Intimate partner violence:     Fear of current or ex partner: Not on file     Emotionally abused: Not on file     Physically abused: Not on file     Forced sexual activity: Not on file   Other Topics Concern    Not on file   Social History Narrative    Not on file     OB History    None      Obstetric Comments   Menarche 17, LMP unknown, # of children 1, age of 4st delivery 14, Hysterectomy/oophorectomy yes/yes, Breast bx yes-LEFT 2008, breast cancer, history of breast feeding no, BCP no, Hormone therapy no             Current Outpatient Medications:     pravastatin (PRAVACHOL) 10 mg tablet, take 1 tablet by mouth at bedtime, Disp: 30 Tab, Rfl: 6    memantine (NAMENDA) 10 mg tablet, 1 po bid, Disp: 60 Tab, Rfl: 5    aspirin delayed-release 81 mg tablet, Take 1 Tab by mouth daily. , Disp: 30 Tab, Rfl: 0    donepezil (ARICEPT) 10 mg tablet, Take 1 Tab by mouth nightly., Disp: 30 Tab, Rfl: 5    memantine (NAMENDA) 5 mg tablet, 1 p.o. daily for 30 days and then 1 p.o. twice daily. Indications: MODERATE TO SEVERE ALZHEIMER'S TYPE DEMENTIA, Disp: 60 Tab, Rfl: 5  Allergies   Allergen Reactions    Pcn [Penicillins] Hives       Review of Systems   Psychiatric/Behavioral: Positive for memory loss. All other systems reviewed and are negative. Physical Exam   Constitutional: She is oriented to person, place, and time. She appears well-developed and well-nourished. HENT:   Head: Normocephalic. Eyes: EOM are normal.   Neck: Neck supple. Cardiovascular: Intact distal pulses. Pulmonary/Chest: Effort normal. Right breast exhibits mass (8:00 1 cm mass).  Right breast exhibits no inverted nipple, no nipple discharge, no skin change and no tenderness. Left breast exhibits no inverted nipple, no mass, no nipple discharge, no skin change and no tenderness. Breasts are symmetrical.       Abdominal: Soft. Musculoskeletal: Normal range of motion. Lymphadenopathy:     She has no cervical adenopathy. She has no axillary adenopathy. Neurological: She is alert and oriented to person, place, and time. Skin: Skin is warm and dry. Psychiatric: She has a normal mood and affect. Nursing note and vitals reviewed. BREAST ULTRASOUND, Preop planning  Indication:preop planning  right Breast 8:00   Technique: The area was scanned using a high-frequency linear-array near-field transducer  Findings: 1.2 cm  irregular mass with dropout  Impression: Biopsy site visible with ultrasound  Disposition:  Will schedule lumpectomy with sentinel lymph node biopsy  ASSESSMENT and PLAN    ICD-10-CM ICD-9-CM    1. History of left breast cancer Z85.3 V10.3 BRAC-ANALYSIS   2. Malignant neoplasm of right breast in female, estrogen receptor positive, unspecified site of [facility] ([facility] C50.911 174.9 BRAC-ANALYSIS    Z17.0 V86.0    3. Family history of breast cancer Z80.3 V16.3 BRAC-ANALYSIS     65 yo female with right breast T1 N0 IDC Er/Pr + Her 2 arvind negative. She is here with her family. I have reviewed the imaging and pathology with her and she was given copies of these reports. 90 minutes were spent face-to-face with the patient during this encounter and 90% of that time was spent on counseling and coordination of care. 1. Discussed lumpectomy and radiation vs mastectomy. Discussed reconstruction. 2. Discussed sentinel lymph node biopsy. 3. Discussed external beam radiation. 4. Discussed hormone therapy. 5. Discussed the possibility of chemotherapy. Given her history of breast cancer and family history will send genetic testing.    She has rapidly progressive dementia and I would like for her to see her neurologist to determine how this is doing and if ok to use general anesthesia or not. If not just do us guided lumpectomy. The family was in agreement with this plan. If surgery is not advised at all could do hormone blockade. They prefer surgery. Will schedule surgery once she has seen Dr. Carey Coker.

## 2019-05-22 ENCOUNTER — TELEPHONE (OUTPATIENT)
Dept: SURGERY | Age: 64
End: 2019-05-22

## 2019-05-22 NOTE — TELEPHONE ENCOUNTER
The patient's  called wondering when they  would hear from surgery scheduling? I read the office note and told the patient's  we were waiting on the neurologist to make a decision about anesthesia and if they would clear her from their standpoint? The patient's  states the neurologist felt as though that was an anesthesia decision. I instructed the  I would let Dr. Fernie Kay know. There is not any notation of this in CC. He was appreciative.

## 2019-05-23 ENCOUNTER — DOCUMENTATION ONLY (OUTPATIENT)
Dept: SURGERY | Age: 64
End: 2019-05-23

## 2019-05-23 NOTE — PROGRESS NOTES
Received a message from Dr. Staci Minor that Dr. Beth Ridley will see the patient prior to her breast surgery. The neurology office will call the patient's  with the date and time. I called the patient's  to relay the message. If he doesn't hear from them by 3:00pm today he will call me back.

## 2019-05-23 NOTE — PROGRESS NOTES
I called Dr. Maged Hamilton office and spoke to his assistantAnnemarie Starks. I made sure that she was aware that the Dr. Patty Davis wanted the patient to be seen prior to her breast cancer surgery. She stated there was not an appointment available until September. I asked for Dr. Carmine Hope to please call Dr. Patty Davis. Dr. Claude Schmid cell phone number was given.

## 2019-05-24 ENCOUNTER — OFFICE VISIT (OUTPATIENT)
Dept: NEUROLOGY | Age: 64
End: 2019-05-24

## 2019-05-24 VITALS
WEIGHT: 134 LBS | DIASTOLIC BLOOD PRESSURE: 70 MMHG | HEIGHT: 66 IN | OXYGEN SATURATION: 98 % | SYSTOLIC BLOOD PRESSURE: 130 MMHG | HEART RATE: 82 BPM | BODY MASS INDEX: 21.53 KG/M2

## 2019-05-24 DIAGNOSIS — G30.9 ALZHEIMER'S DEMENTIA WITHOUT BEHAVIORAL DISTURBANCE, UNSPECIFIED TIMING OF DEMENTIA ONSET: Primary | ICD-10-CM

## 2019-05-24 DIAGNOSIS — F02.80 ALZHEIMER'S DEMENTIA WITHOUT BEHAVIORAL DISTURBANCE, UNSPECIFIED TIMING OF DEMENTIA ONSET: Primary | ICD-10-CM

## 2019-05-24 NOTE — PROGRESS NOTES
HISTORY OF PRESENT ILLNESS  Easton Craven is a 66 y.o. female. HPI Comments: Patient returns, her memory is changed for the last time I saw her which was relatively recently. Her  is with her today, Dr. Miles Phillips spoke to me yesterday stating that this patient has a breast cancer and needs mastectomy with axillary node dissection. She does have an Alzheimer type dementia but it is far from end-stage. She is pleasant and knows where she is but has very poor recent memory. She was very intolerant of the donepezil and lost weight on it and was nauseated all the time. She is tolerating the amantadine 5 mg twice daily. Complete review of systems done all others negative. Current Outpatient Medications on File Prior to Visit   Medication Sig Dispense Refill    pravastatin (PRAVACHOL) 10 mg tablet take 1 tablet by mouth at bedtime 30 Tab 6    donepezil (ARICEPT) 10 mg tablet Take 1 Tab by mouth nightly. 30 Tab 5    memantine (NAMENDA) 10 mg tablet 1 po bid 60 Tab 5    aspirin delayed-release 81 mg tablet Take 1 Tab by mouth daily. 30 Tab 0     No current facility-administered medications on file prior to visit.       Past Medical History:   Diagnosis Date    Breast cancer ([facility] [address] 2008    Left    Cancer ([facility] [address]     Hyperlipidemia 6/30/2017    Memory disorder     Radiation therapy complication     Lt breast 2008--no chemo     Family History   Problem Relation Age of Onset    Lung Disease Brother     Breast Cancer Sister         43     Social History     Tobacco Use    Smoking status: Never Smoker    Smokeless tobacco: Never Used   Substance Use Topics    Alcohol use: No    Drug use: No     Patient Active Problem List   Diagnosis Code    Ductal carcinoma in situ (DCIS) of left breast D05.12    Hyperlipidemia E78.5    Early onset Alzheimer's dementia without behavioral disturbance G30.0, F02.80    Vestibular schwannoma (HCC) D33.3     /70   Pulse 82   Ht 5' 6\" (1.676 m)   Wt 134 lb (60.8 kg)   SpO2 98%   BMI 21.63 kg/m²     MRI Results (most recent):  Results from Hospital Encounter encounter on 10/12/18   MRI BRAIN W WO CONT    Narrative Clinical indication: Alzheimer dementia, behavioral change, follow-up  schwannoma. Technical factors: Diffusion imaging, axial T1-weighted pre and post 11 cc IV  dotarem ,  T2-weighted and FLAIR, axial gradient echo temporal bone, axial and  coronal T1-weighted temporal bone pre and postcontrast. Comparison November 8, 2017. Diffusion imaging does not show acute ischemic changes. There is no extra-axial  fluid collection hemorrhage or shift. Ventricular size is prominent for age. Stable. Major flow voids in blood vessels at the base of the brain are present. Remote nonspecific white matter disease also unchanged. The  left schwannoma is increasing size maximal AP dimension at this time is 19  mm, it was 12 no new lesion no enhancing lesion are seen. No other significant  abnormality of the temporal bones. Impression IMPRESSION: No acute findings. 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 No  other changes otherwise. Physical Exam   Constitutional: She appears well-developed and well-nourished. No distress. HENT:   Head: Normocephalic and atraumatic. Psychiatric: She has a normal mood and affect. She sits quietly and will answer simple questions. She is pleasantly mildly confused, I did not do a formal MMSE today however it was markedly abnormal in the past with a score of 18    ASSESSMENT and PLAN    BREAST CANCER  This patient has breast cancer, while she does have a moderate dementia, I see no reason she cannot undergo radical mastectomy, treatment for her breast cancer whether to involve surgery with general anesthesia or other nonsurgical treatments to prevent recurrence may worsen her dementia somewhat however I do not see any alternative.   DEMENTIA  Patient has an early fairly rapidly progressive Alzheimer type dementia. She is now taking memantine 5 mg twice daily, she was very intolerant of the donepezil. I am going to increase the memantine to 10 mg in the morning and 5 mg in the evening for 4 weeks and then up to 10 mg twice a day. I am not going to attempt to the donepezil back. LEFT CP ANGLE TUMOR  The last MRI scan done 12/2/2018 revealed that acoustic neuroma has  increased a maximum of 7 mm in the AP measurement over the 12 months prior to 10/1. The patient is still asymptomatic from this tumor.

## 2019-05-24 NOTE — PATIENT INSTRUCTIONS

## 2019-05-24 NOTE — LETTER
5/24/2019 11:26 AM 
 
Patient:  Sean Leyva YOB: 1955 Date of Visit: 5/24/2019 Dear Francia Izaguirre, YAJAIRA 
[address] [place] [facility] [address] VIA In Basket Kingston Sanders MD 
[address] [facility] [address] [id number] VIA In Basket 
 : Thank you for referring Ms. Ga Richmond to me for evaluation/treatment. Below are the relevant portions of my assessment and plan of care. Patient having surgery Needs clearance from dr. London Rodriguez HISTORY OF PRESENT ILLNESS Sean Leyva is a 66 y.o. female. HPI Comments: Patient returns, her memory is changed for the last time I saw her which was relatively recently. Her  is with her today, Dr. Donald Pearson spoke to me yesterday stating that this patient has a breast cancer and needs mastectomy with axillary node dissection. She does have an Alzheimer type dementia but it is far from end-stage. She is pleasant and knows where she is but has very poor recent memory. She was very intolerant of the donepezil and lost weight on it and was nauseated all the time. She is tolerating the amantadine 5 mg twice daily. Complete review of systems done all others negative. Current Outpatient Medications on File Prior to Visit Medication Sig Dispense Refill  pravastatin (PRAVACHOL) 10 mg tablet take 1 tablet by mouth at bedtime 30 Tab 6  
 donepezil (ARICEPT) 10 mg tablet Take 1 Tab by mouth nightly. 30 Tab 5  
 memantine (NAMENDA) 10 mg tablet 1 po bid 60 Tab 5  
 aspirin delayed-release 81 mg tablet Take 1 Tab by mouth daily. 30 Tab 0 No current facility-administered medications on file prior to visit. Past Medical History:  
Diagnosis Date  Breast cancer ([address] 2008 Left  Cancer ([facility] [address]  Hyperlipidemia 6/30/2017  Memory disorder  Radiation therapy complication Lt breast 2008--no chemo Family History Problem Relation Age of Onset  Lung Disease Brother  Breast Cancer Sister 43 Social History Tobacco Use  Smoking status: Never Smoker  Smokeless tobacco: Never Used Substance Use Topics  Alcohol use: No  
 Drug use: No  
 
Patient Active Problem List  
Diagnosis Code  Ductal carcinoma in situ (DCIS) of left breast D05.12  
 Hyperlipidemia E78.5  Early onset Alzheimer's dementia without behavioral disturbance G30.0, F02.80  Vestibular schwannoma ([address] D33.3 /70   Pulse 82   Ht 5' 6\" (1.676 m)   Wt 134 lb (60.8 kg)   SpO2 98%   BMI 21.63 kg/m² MRI Results (most recent): 
Results from Hospital Encounter encounter on 10/12/18 MRI BRAIN W WO CONT Narrative Clinical indication: Alzheimer dementia, behavioral change, follow-up 
schwannoma. Technical factors: Diffusion imaging, axial T1-weighted pre and post 11 cc IV 
dotarem ,  T2-weighted and FLAIR, axial gradient echo temporal bone, axial and 
coronal T1-weighted temporal bone pre and postcontrast. Comparison November 8, 2017. Diffusion imaging does not show acute ischemic changes. There is no extra-axial 
fluid collection hemorrhage or shift. Ventricular size is prominent for age. Stable. Major flow voids in blood vessels at the base of the brain are present. Remote nonspecific white matter disease also unchanged. The  left schwannoma is increasing size maximal AP dimension at this time is 19 
mm, it was 12 no new lesion no enhancing lesion are seen. No other significant 
abnormality of the temporal bones. Impression IMPRESSION: No acute findings. 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 No 
other changes otherwise. Physical Exam  
Constitutional: She appears well-developed and well-nourished. No distress. HENT:  
Head: Normocephalic and atraumatic. Psychiatric: She has a normal mood and affect. She sits quietly and will answer simple questions. She is pleasantly mildly confused, I did not do a formal MMSE today however it was markedly abnormal in the past with a score of 18 ASSESSMENT and PLAN 
 
BREAST CANCER This patient has breast cancer, while she does have a moderate dementia, I see no reason she cannot undergo radical mastectomy, treatment for her breast cancer whether to involve surgery with general anesthesia or other nonsurgical treatments to prevent recurrence may worsen her dementia somewhat however I do not see any alternative. DEMENTIA Patient has an early fairly rapidly progressive Alzheimer type dementia. She is now taking memantine 5 mg twice daily, she was very intolerant of the donepezil. I am going to increase the memantine to 10 mg in the morning and 5 mg in the evening for 4 weeks and then up to 10 mg twice a day. I am not going to attempt to the donepezil back. LEFT CP ANGLE TUMOR The last MRI scan done 12/2/2018 revealed that acoustic neuroma has  increased a maximum of 7 mm in the AP measurement over the 12 months prior to 10/1. The patient is still asymptomatic from this tumor. If you have questions, please do not hesitate to call me. I look forward to following Ms. Soumya Vann along with you. Sincerely, Estela Collins MD

## 2019-06-17 ENCOUNTER — DOCUMENTATION ONLY (OUTPATIENT)
Dept: SURGERY | Age: 64
End: 2019-06-17

## 2019-06-17 DIAGNOSIS — C50.911 MALIGNANT NEOPLASM OF RIGHT BREAST IN FEMALE, ESTROGEN RECEPTOR POSITIVE, UNSPECIFIED SITE OF BREAST (HCC): Primary | ICD-10-CM

## 2019-06-17 DIAGNOSIS — Z17.0 MALIGNANT NEOPLASM OF RIGHT BREAST IN FEMALE, ESTROGEN RECEPTOR POSITIVE, UNSPECIFIED SITE OF BREAST (HCC): Primary | ICD-10-CM

## 2019-06-17 NOTE — PROGRESS NOTES
SCHEDULED SURGERY AT [address] [place] [address] ON 6-27-19 AT 9:00 AM; LOC  AM; PATIENT WILL ARRIVE AT 6:00 AM AND CHECK IN AT M[facility] 3    PREADMISSION TESTING-NURSE FROM HOSPITAL WILL CALL PATIENT    POST OP AT [address] [place] [address] ON 7-17-19 AT 3 PM    PATIENT'S , GREGG SORIANO, WAS CALLED AND GIVEN INSTRUCTIONS

## 2019-06-24 NOTE — PERIOP NOTES
[facility] Ambulatory Surgery Unit Pre-operative Instructions Surgery/Procedure Date  06/27/19            Tentative Arrival Time 0630 1. On the day of your surgery/procedure, please report to the Ambulatory Surgery Unit Registration Desk and sign in at your designated time. The Ambulatory Surgery Unit is located in [facility] on the [facility] side of the [facility] across from the [address] [place] [address]. Please have all of your health insurance cards and a photo ID. 2. You must have someone with you to drive you home, as you should not drive a car for 24 hours following anesthesia. Please make arrangements for a responsible adult friend or family member to stay with you for at least the first 24 hours after your surgery. 3. Do not have anything to eat or drink (including water, gum, mints, coffee, juice) after 11:59 PM  06/26/19. This may not apply to medications prescribed by your physician. (Please note below the special instructions with medications to take the morning of surgery, if applicable.) 4. We recommend you do not drink any alcoholic beverages for 24 hours before and after your surgery. 5. Contact your surgeons office for instructions on the following medications: non-steroidal anti-inflammatory drugs (i.e. Advil, Aleve), vitamins, and supplements. (Some surgeons will want you to stop these medications prior to surgery and others may allow you to take them) **If you are currently taking Plavix, Coumadin, Aspirin and/or other blood-thinning agents, contact your surgeon for instructions. ** Your surgeon will partner with the physician prescribing these medications to determine if it is safe to stop or if you need to continue taking. Please do not stop taking these medications without instructions from your surgeon.  
 
6. In an effort to help prevent surgical site infection, we ask that you shower with an anti-bacterial soap (i.e. Dial/Safeguard, or the soap provided to you at your preadmission testing appointment) for 3 days prior to and on the morning of surgery, using a fresh towel after each shower. (Please begin this process with fresh bed linens.) Do not apply any lotions, powders, or deodorants after the shower on the day of your procedure. If applicable, please do not shave the operative site for 48 hours prior to surgery. 7. Wear comfortable clothes. Wear glasses instead of contacts. Do not bring any jewelry or money (other than copays or fees as instructed). Do not wear make-up, particularly mascara, the morning of your surgery. Do not wear nail polish, particularly if you are having foot /hand surgery. Wear your hair loose or down, no ponytails, buns, aminah pins or clips. All body piercings must be removed. 8. You should understand that if you do not follow these instructions your surgery may be cancelled. If your physical condition changes (i.e. fever, cold or flu) please contact your surgeon as soon as possible. 9. It is important that you be on time. If a situation occurs where you may be late, or if you have any questions or problems, please call [phone]. 
 
10. Your surgery time may be subject to change. You will receive a phone call the day prior to surgery to confirm your arrival time. Special Instructions: Take all medications and inhalers, as prescribed, on the morning of surgery with a sip of water EXCEPT: n/a I understand a pre-operative phone call will be made to verify my surgery time. In the event that I am not available, I give permission for a message to be left on my answering service and/or with another person? yes 
 
 
 
 ___________________      ___________________      ________________ 
(Signature of Patient)          (Witness)                   (Date and Time)

## 2019-06-26 ENCOUNTER — ANESTHESIA EVENT (OUTPATIENT)
Dept: SURGERY | Age: 64
End: 2019-06-26
Payer: COMMERCIAL

## 2019-06-27 ENCOUNTER — HOSPITAL ENCOUNTER (OUTPATIENT)
Dept: NUCLEAR MEDICINE | Age: 64
Discharge: HOME | End: 2019-06-27
Attending: SURGERY
Payer: COMMERCIAL

## 2019-06-27 ENCOUNTER — HOSPITAL ENCOUNTER (OUTPATIENT)
Age: 64
Setting detail: OUTPATIENT SURGERY
Discharge: HOME | End: 2019-06-27
Attending: SURGERY | Admitting: SURGERY
Payer: COMMERCIAL

## 2019-06-27 ENCOUNTER — ANESTHESIA (OUTPATIENT)
Dept: SURGERY | Age: 64
End: 2019-06-27
Payer: COMMERCIAL

## 2019-06-27 VITALS
DIASTOLIC BLOOD PRESSURE: 79 MMHG | OXYGEN SATURATION: 99 % | BODY MASS INDEX: 21.53 KG/M2 | RESPIRATION RATE: 15 BRPM | HEART RATE: 77 BPM | SYSTOLIC BLOOD PRESSURE: 138 MMHG | WEIGHT: 134 LBS | HEIGHT: 66 IN | TEMPERATURE: 97.5 F

## 2019-06-27 DIAGNOSIS — Z17.0 MALIGNANT NEOPLASM OF RIGHT BREAST IN FEMALE, ESTROGEN RECEPTOR POSITIVE, UNSPECIFIED SITE OF BREAST (HCC): ICD-10-CM

## 2019-06-27 DIAGNOSIS — C50.911 MALIGNANT NEOPLASM OF RIGHT BREAST IN FEMALE, ESTROGEN RECEPTOR POSITIVE, UNSPECIFIED SITE OF BREAST (HCC): ICD-10-CM

## 2019-06-27 DIAGNOSIS — N63.13 BREAST LUMP ON RIGHT SIDE AT 7 O'CLOCK POSITION: ICD-10-CM

## 2019-06-27 PROCEDURE — 74011250636 HC RX REV CODE- 250/636

## 2019-06-27 PROCEDURE — 88331 PATH CONSLTJ SURG 1 BLK 1SPC: CPT

## 2019-06-27 PROCEDURE — 88342 IMHCHEM/IMCYTCHM 1ST ANTB: CPT

## 2019-06-27 PROCEDURE — 77030002996 HC SUT SLK J&J -A: Performed by: SURGERY

## 2019-06-27 PROCEDURE — 77030011267 HC ELECTRD BLD COVD -A: Performed by: SURGERY

## 2019-06-27 PROCEDURE — 74011250636 HC RX REV CODE- 250/636: Performed by: ANESTHESIOLOGY

## 2019-06-27 PROCEDURE — 74011250636 HC RX REV CODE- 250/636: Performed by: SURGERY

## 2019-06-27 PROCEDURE — 77030021352 HC CBL LD SYS DISP COVD -B: Performed by: SURGERY

## 2019-06-27 PROCEDURE — 77030011825 HC SUPP SURG PSTOP S2SG -B: Performed by: SURGERY

## 2019-06-27 PROCEDURE — 77030034556 HC PRB MARGN DETECT LUMPECTMY DISP DUNE -G: Performed by: SURGERY

## 2019-06-27 PROCEDURE — 77030034626 HC LIGASURE SM JAW SEAL OPN SURG COVD -E: Performed by: SURGERY

## 2019-06-27 PROCEDURE — 88307 TISSUE EXAM BY PATHOLOGIST: CPT

## 2019-06-27 PROCEDURE — 76210000034 HC AMBSU PH I REC 0.5 TO 1 HR: Performed by: SURGERY

## 2019-06-27 PROCEDURE — 77030031139 HC SUT VCRL2 J&J -A: Performed by: SURGERY

## 2019-06-27 PROCEDURE — 77030018836 HC SOL IRR NACL ICUM -A: Performed by: SURGERY

## 2019-06-27 PROCEDURE — 77030020255 HC SOL INJ LR 1000ML BG: Performed by: SURGERY

## 2019-06-27 PROCEDURE — 76030000001 HC AMB SURG OR TIME 1 TO 1.5: Performed by: SURGERY

## 2019-06-27 PROCEDURE — 77030011640 HC PAD GRND REM COVD -A: Performed by: SURGERY

## 2019-06-27 PROCEDURE — 77030018673: Performed by: SURGERY

## 2019-06-27 PROCEDURE — 77030002933 HC SUT MCRYL J&J -A: Performed by: SURGERY

## 2019-06-27 PROCEDURE — 76210000056 HC AMBSU PH II REC 1.5 TO 2 HR: Performed by: SURGERY

## 2019-06-27 PROCEDURE — 74011000250 HC RX REV CODE- 250: Performed by: SURGERY

## 2019-06-27 PROCEDURE — 77030010509 HC AIRWY LMA MSK TELE -A: Performed by: NURSE ANESTHETIST, CERTIFIED REGISTERED

## 2019-06-27 PROCEDURE — 76060000062 HC AMB SURG ANES 1 TO 1.5 HR: Performed by: SURGERY

## 2019-06-27 PROCEDURE — 78195 LYMPH SYSTEM IMAGING: CPT

## 2019-06-27 RX ORDER — DEXAMETHASONE SODIUM PHOSPHATE 4 MG/ML
INJECTION, SOLUTION INTRA-ARTICULAR; INTRALESIONAL; INTRAMUSCULAR; INTRAVENOUS; SOFT TISSUE AS NEEDED
Status: DISCONTINUED | OUTPATIENT
Start: 2019-06-27 | End: 2019-06-27 | Stop reason: HOSPADM

## 2019-06-27 RX ORDER — SODIUM CHLORIDE 0.9 % (FLUSH) 0.9 %
5-40 SYRINGE (ML) INJECTION AS NEEDED
Status: DISCONTINUED | OUTPATIENT
Start: 2019-06-27 | End: 2019-06-27 | Stop reason: HOSPADM

## 2019-06-27 RX ORDER — PROPOFOL 10 MG/ML
INJECTION, EMULSION INTRAVENOUS AS NEEDED
Status: DISCONTINUED | OUTPATIENT
Start: 2019-06-27 | End: 2019-06-27 | Stop reason: HOSPADM

## 2019-06-27 RX ORDER — MORPHINE SULFATE 10 MG/ML
2 INJECTION, SOLUTION INTRAMUSCULAR; INTRAVENOUS
Status: DISCONTINUED | OUTPATIENT
Start: 2019-06-27 | End: 2019-06-27 | Stop reason: HOSPADM

## 2019-06-27 RX ORDER — DIPHENHYDRAMINE HYDROCHLORIDE 50 MG/ML
12.5 INJECTION, SOLUTION INTRAMUSCULAR; INTRAVENOUS AS NEEDED
Status: DISCONTINUED | OUTPATIENT
Start: 2019-06-27 | End: 2019-06-27 | Stop reason: HOSPADM

## 2019-06-27 RX ORDER — SODIUM CHLORIDE 0.9 % (FLUSH) 0.9 %
5-40 SYRINGE (ML) INJECTION EVERY 8 HOURS
Status: DISCONTINUED | OUTPATIENT
Start: 2019-06-27 | End: 2019-06-27 | Stop reason: HOSPADM

## 2019-06-27 RX ORDER — FENTANYL CITRATE 50 UG/ML
INJECTION, SOLUTION INTRAMUSCULAR; INTRAVENOUS AS NEEDED
Status: DISCONTINUED | OUTPATIENT
Start: 2019-06-27 | End: 2019-06-27 | Stop reason: HOSPADM

## 2019-06-27 RX ORDER — SODIUM CHLORIDE, SODIUM LACTATE, POTASSIUM CHLORIDE, CALCIUM CHLORIDE 600; 310; 30; 20 MG/100ML; MG/100ML; MG/100ML; MG/100ML
25 INJECTION, SOLUTION INTRAVENOUS CONTINUOUS
Status: DISCONTINUED | OUTPATIENT
Start: 2019-06-27 | End: 2019-06-27 | Stop reason: HOSPADM

## 2019-06-27 RX ORDER — OXYCODONE AND ACETAMINOPHEN 5; 325 MG/1; MG/1
1 TABLET ORAL
Status: DISCONTINUED | OUTPATIENT
Start: 2019-06-27 | End: 2019-06-27 | Stop reason: HOSPADM

## 2019-06-27 RX ORDER — FENTANYL CITRATE 50 UG/ML
25 INJECTION, SOLUTION INTRAMUSCULAR; INTRAVENOUS
Status: DISCONTINUED | OUTPATIENT
Start: 2019-06-27 | End: 2019-06-27 | Stop reason: HOSPADM

## 2019-06-27 RX ORDER — ACETAMINOPHEN 10 MG/ML
INJECTION, SOLUTION INTRAVENOUS
Status: DISCONTINUED
Start: 2019-06-27 | End: 2019-06-27 | Stop reason: HOSPADM

## 2019-06-27 RX ORDER — ONDANSETRON 2 MG/ML
INJECTION INTRAMUSCULAR; INTRAVENOUS AS NEEDED
Status: DISCONTINUED | OUTPATIENT
Start: 2019-06-27 | End: 2019-06-27 | Stop reason: HOSPADM

## 2019-06-27 RX ORDER — ACETAMINOPHEN 10 MG/ML
1000 INJECTION, SOLUTION INTRAVENOUS ONCE
Status: COMPLETED | OUTPATIENT
Start: 2019-06-27 | End: 2019-06-27

## 2019-06-27 RX ORDER — BUPIVACAINE HYDROCHLORIDE 2.5 MG/ML
20 INJECTION, SOLUTION EPIDURAL; INFILTRATION; INTRACAUDAL ONCE
Status: COMPLETED | OUTPATIENT
Start: 2019-06-27 | End: 2019-06-27

## 2019-06-27 RX ORDER — CLINDAMYCIN PHOSPHATE 900 MG/50ML
INJECTION INTRAVENOUS
Status: COMPLETED
Start: 2019-06-27 | End: 2019-06-27

## 2019-06-27 RX ORDER — CLINDAMYCIN PHOSPHATE 900 MG/50ML
900 INJECTION INTRAVENOUS ONCE
Status: COMPLETED | OUTPATIENT
Start: 2019-06-27 | End: 2019-06-27

## 2019-06-27 RX ORDER — KETOROLAC TROMETHAMINE 30 MG/ML
INJECTION, SOLUTION INTRAMUSCULAR; INTRAVENOUS AS NEEDED
Status: DISCONTINUED | OUTPATIENT
Start: 2019-06-27 | End: 2019-06-27 | Stop reason: HOSPADM

## 2019-06-27 RX ORDER — MIDAZOLAM HYDROCHLORIDE 1 MG/ML
INJECTION, SOLUTION INTRAMUSCULAR; INTRAVENOUS AS NEEDED
Status: DISCONTINUED | OUTPATIENT
Start: 2019-06-27 | End: 2019-06-27 | Stop reason: HOSPADM

## 2019-06-27 RX ORDER — ESMOLOL HYDROCHLORIDE 10 MG/ML
INJECTION INTRAVENOUS AS NEEDED
Status: DISCONTINUED | OUTPATIENT
Start: 2019-06-27 | End: 2019-06-27 | Stop reason: HOSPADM

## 2019-06-27 RX ORDER — TRAMADOL HYDROCHLORIDE 50 MG/1
50 TABLET ORAL
Qty: 20 TAB | Refills: 0 | Status: SHIPPED | OUTPATIENT
Start: 2019-06-27 | End: 2019-06-30

## 2019-06-27 RX ORDER — ONDANSETRON 4 MG/1
4 TABLET, ORALLY DISINTEGRATING ORAL
Qty: 5 TAB | Refills: 1 | Status: SHIPPED | OUTPATIENT
Start: 2019-06-27 | End: 2020-09-21

## 2019-06-27 RX ORDER — LIDOCAINE HYDROCHLORIDE 10 MG/ML
0.1 INJECTION, SOLUTION EPIDURAL; INFILTRATION; INTRACAUDAL; PERINEURAL AS NEEDED
Status: DISCONTINUED | OUTPATIENT
Start: 2019-06-27 | End: 2019-06-27 | Stop reason: HOSPADM

## 2019-06-27 RX ORDER — LIDOCAINE HYDROCHLORIDE AND EPINEPHRINE 10; 10 MG/ML; UG/ML
20 INJECTION, SOLUTION INFILTRATION; PERINEURAL ONCE
Status: COMPLETED | OUTPATIENT
Start: 2019-06-27 | End: 2019-06-27

## 2019-06-27 RX ORDER — HYDROMORPHONE HYDROCHLORIDE 1 MG/ML
.2-.5 INJECTION, SOLUTION INTRAMUSCULAR; INTRAVENOUS; SUBCUTANEOUS ONCE
Status: DISCONTINUED | OUTPATIENT
Start: 2019-06-27 | End: 2019-06-27 | Stop reason: HOSPADM

## 2019-06-27 RX ADMIN — MIDAZOLAM HYDROCHLORIDE 2 MG: 1 INJECTION, SOLUTION INTRAMUSCULAR; INTRAVENOUS at 08:55

## 2019-06-27 RX ADMIN — DEXAMETHASONE SODIUM PHOSPHATE 4 MG: 4 INJECTION, SOLUTION INTRA-ARTICULAR; INTRALESIONAL; INTRAMUSCULAR; INTRAVENOUS; SOFT TISSUE at 09:06

## 2019-06-27 RX ADMIN — SODIUM CHLORIDE, SODIUM LACTATE, POTASSIUM CHLORIDE, AND CALCIUM CHLORIDE 25 ML/HR: 600; 310; 30; 20 INJECTION, SOLUTION INTRAVENOUS at 08:27

## 2019-06-27 RX ADMIN — PROPOFOL 200 MG: 10 INJECTION, EMULSION INTRAVENOUS at 09:03

## 2019-06-27 RX ADMIN — ACETAMINOPHEN 1000 MG: 10 INJECTION, SOLUTION INTRAVENOUS at 11:17

## 2019-06-27 RX ADMIN — KETOROLAC TROMETHAMINE 30 MG: 30 INJECTION, SOLUTION INTRAMUSCULAR; INTRAVENOUS at 09:11

## 2019-06-27 RX ADMIN — CLINDAMYCIN PHOSPHATE 900 MG: 18 INJECTION, SOLUTION INTRAVENOUS at 08:55

## 2019-06-27 RX ADMIN — SODIUM CHLORIDE, SODIUM LACTATE, POTASSIUM CHLORIDE, AND CALCIUM CHLORIDE: 600; 310; 30; 20 INJECTION, SOLUTION INTRAVENOUS at 08:55

## 2019-06-27 RX ADMIN — FENTANYL CITRATE 25 MCG: 50 INJECTION, SOLUTION INTRAMUSCULAR; INTRAVENOUS at 09:37

## 2019-06-27 RX ADMIN — ESMOLOL HYDROCHLORIDE 10 MG: 10 INJECTION INTRAVENOUS at 10:07

## 2019-06-27 RX ADMIN — CLINDAMYCIN PHOSPHATE 2 MG: 18 INJECTION, SOLUTION INTRAVENOUS at 08:56

## 2019-06-27 RX ADMIN — FENTANYL CITRATE 50 MCG: 50 INJECTION, SOLUTION INTRAMUSCULAR; INTRAVENOUS at 09:00

## 2019-06-27 RX ADMIN — ONDANSETRON 4 MG: 2 INJECTION INTRAMUSCULAR; INTRAVENOUS at 09:07

## 2019-06-27 NOTE — PERIOP NOTES
Permission received to review discharge instructions and discuss private health information with  and 2 sisters. 1044-Pt tolerating liquids at this time. VSS. Family brought to bedside, updated on pt's status. Beginning the review of d/c instructions. 1109-D/c instructions reviewed thoroughly, all questions answered. Reviewed when to take pain meds, when to call 911, hygiene, activity and diet. Reviewed when to call the doctor. It was noted at this time, pt's blood pressure increasing. Family stated she is probably in pain, checked with pt, and she stated her axilla area is hurting pretty bad, discussed with Dr Stephanie Maharaj who ordered IV Tylenol. 1117-Iv Tylenol 1000mg I infusing per order. 1130- Pt reports pain is improving in axilla area from pretty bad to in the middle. Blood pressure has decreased as well, pt reporting mild nausea, given Quease ease to smell to allerviate nausea,  given vomit bags for on the ride home as well. 1145-Pt reporting pain is much better, feeling ready to go home, nausea better as well. VSS. Pt's  in agreement with d/c home at this time and pt verbally states she is ready to go home. Wrote for pt and  of when next tylenol dose is due. 1210-Pt voided x 1 w/o difficulty. 1213-Transported via w/c to awaiting transportation.

## 2019-06-27 NOTE — DISCHARGE INSTRUCTIONS
Discharge Instructions from Dr. Jose Cardenas    · I will call you with the pathology results, typically within 1 week from today. · You may shower, but no hot tubs, swimming pools, or baths until your incision is healed. · No heavy lifting with the affected extremity (nothing greater than 5 pounds), and limit its use for the next 4-5 days. · You may use an ice pack for comfort for the next couple of days, but do not place ice directly on the skin. Rather, use a towel or clothing to serve as a barrier between skin and ice to prevent injury. · Follow medication instructions carefully. No aspirin, ibuprofen or aleve x 5 days  · May take tylenol  · Wear surgical bra and dressing for 24 hours, then remove. Wear supportive bra at all times. · You will have bruising and swelling  · Watch for signs of infection as listed below. · Redness  · Swelling  · Drainage from the incision or from your nipple that appears infected  · Fever over 101.5 degrees for consecutive readings, or over 99.5 if you are currently undergoing chemotherapy. · Call our office (number is below) for a follow-up appointment. · If you have any problems, our phone number is [phone]    TO PREVENT AN INFECTION      1. [address] YOUR HANDS     To prevent infection, good handwashing is the most important thing you or your caregiver can do.  Wash your hands with soap and water or use the hand  we gave you before you touch any wounds. 2. SHOWER     Use the antibacterial soap we gave you when you take a shower.  Shower with this soap until your wounds are healed.  To reach all areas of your body, you may need someone to help you.  Dont forget to clean your belly button with every shower. 3.  USE CLEAN SHEETS     Use freshly cleaned sheets on your bed after surgery.  To keep the surgery site clean, do not allow pets to sleep with you while your wound is still healing.      4. STOP SMOKING     Stop smoking, or at least cut back on smoking     Smoking slows your healing. 5.  CONTROL YOUR BLOOD SUGAR     High blood sugars slow wound healing.  If you are diabetic, control your blood sugar levels before and after your surgery. DO NOT TAKE TYLENOL/ACETAMINOPHEN WITH PERCOCET, LORTAB, [address] TAKE NARCOTIC PAIN MEDICATIONS WITH FOOD     Narcotics tend to be constipating, we suggest taking a stool softener such as Colace or Miralax (follow package instructions). DO NOT DRIVE WHILE TAKING NARCOTIC PAIN MEDICATIONS. DO NOT TAKE SLEEPING MEDICATIONS OR ANTIANXIETY MEDICATIONS WHILE TAKING NARCOTIC PAIN MEDICATIONS,  ESPECIALLY THE NIGHT OF ANESTHESIA! CPAP PATIENTS BE SURE TO WEAR MACHINE WHENEVER NAPPING OR SLEEPING! DISCHARGE SUMMARY from Nurse    The following personal items collected during your admission are returned to you:   Dental Appliance: Dental Appliances: Lowers, At home  Vision: Visual Aid: Glasses, At home  Hearing Aid:    Jewelry: Jewelry: None  Clothing: Clothing: With patient  Other Valuables: Other Valuables: None  Valuables sent to safe:        PATIENT INSTRUCTIONS:    After General Anesthesia or Intravenous Sedation, for 24 hours or while taking prescription Narcotics:        Someone should be with you for the next 24 hours. For your own safety, a responsible adult must drive you home. · Limit your activities  · Recommended activity: Rest today, up with assistance today. Do not climb stairs or shower unattended for the next 24 hours. · Please start with a soft bland diet and advance as tolerated (no nausea) to regular diet. · If you have a sore throat you should try the following: fluids, warm salt water gargles, or throat lozenges. If it does not improve after several days please follow up with your primary physician.   · Do not drive and operate hazardous machinery  · Do not make important personal or business decisions  · Do  not drink alcoholic beverages  · If you have not urinated within 8 hours after discharge, please contact your surgeon on call. Report the following to your surgeon:  · Excessive pain, swelling, redness or odor of or around the surgical area  · Temperature over 100.5  · Nausea and vomiting lasting longer than 4 hours or if unable to take medications  · Any signs of decreased circulation or nerve impairment to extremity: change in color, persistent  numbness, tingling, coldness or increase pain      · You will receive a Post Operative Call from one of the Recovery Room Nurses on the day after your surgery to check on you. It is very important for us to know how you are recovering after your surgery. If you have an issue or need to speak with someone, please call your surgeon, do not wait for the post operative call. · You may receive an e-mail or letter in the mail from [facility] regarding your experience with us in the Ambulatory Surgery Unit. Your feedback is valuable to us and we appreciate your participation in the survey. · If the above instructions are not adequate or you are having problems after your surgery, call the physician at their office number. · We wish you a speedy recovery ? What to do at Home:      *  Please give a list of your current medications to your Primary Care Provider. *  Please update this list whenever your medications are discontinued, doses are      changed, or new medications (including over-the-counter products) are added. *  Please carry medication information at all times in case of emergency situations. If you have not received your influenza and/or pneumococcal vaccine, please follow up with your primary care physician. The discharge information has been reviewed with the patient and caregiver. The patient and caregiver verbalized understanding.     [address] [facility]  DEEP VEIN THROMBOSIS AND PULMONARY EMBOLUS    SURGICAL PATIENTS  Surgical patients are the #1 risk for DVT and PE. WHAT IS DVT? WHAT IS PE?  DVT is a serious condition where blood clots develop deep in the veins of the legs. PE occurs when a blood clot breaks loose from the wall of a vein and travels to the lungs blocking the pulmonary artery or one of its branches impairing blood flow from the heart, which could result in death. RISK FACTORS   Surgery lasting longer than 45 minutes   History of inflammatory bowel disease   Oral contraceptive or hormone replacement therapy   Immobilization   Varicose veins / swollen legs   Smoking    CHF / Acute MI / Irregular heart beat   Family history of thrombosis   General anesthesia greater than 2 hours   Obesity   Infection of less than one month   Less than 1 month postpartum   COPD / Pneumonia   Arthroscopic surgery   Malignancy / cancer   Spine surgery   Blood abnormalities   Stroke / Paralysis / Coma    SIGNS AND SYMPTOMS OF DEEP VEIN TROMBOSIS  Usually occurs in one leg, above or below the knee   Swelling - one calf or thigh may be larger than the other   Feeling increased warmth in the area of the leg that is swollen or painful   Leg pain, which may increase when standing or walking   Swelling along the vein of the leg   When swollen areas is pressed with a finger, a depression may remain   Tenderness of the leg that may be confined to one area   Change in leg color (bluish or red)    SIGNS AND SYMPTOMS OF PULMONARY EMBOLUS   Chest pain that gets worse with deep breath, coughing or chest movement   Coughing up blood   Sweating   Shortness of breath or difficulty breathing   Rapid heart beat   Lightheadedness    HOW TO REDUCE THE POSSIBLE RISK OF DVT   Exercise - simple activities as rotating ankles and wrists, wiggling toes and fingers, tightening and relaxing muscles in calves and thighs promotes circulation while recovering from surgery.  Please do these exercises every hour during waking hours   Resume your normal activities as soon as your doctor advises you to do so.  Remember, when traveling, to [place] your legs frequently.         PATIENTS WHO BELIEVE THEY MAY BE EXPERIENCING SIGNS AND SYMPTOMS OF DVT OR PE SHOULD SEEK MEDICAL HELP IMMEDIATELY

## 2019-06-27 NOTE — ANESTHESIA POSTPROCEDURE EVALUATION
Procedure(s): RIGHT BREAST LUMPECTOMY, RIGHT BREAST SENTINEL NODE BIOPSY SENTINEL NODE BIOPSY. general 
 
Anesthesia Post Evaluation Multimodal analgesia: multimodal analgesia used between 6 hours prior to anesthesia start to PACU discharge Patient location during evaluation: bedside Patient participation: complete - patient participated Level of consciousness: awake Pain score: 0 Airway patency: patent Anesthetic complications: no 
Cardiovascular status: acceptable Respiratory status: acceptable Hydration status: acceptable Post anesthesia nausea and vomiting:  none Vitals Value Taken Time /88 6/27/2019 10:25 AM  
Temp 36.7 °C (98.1 °F) 6/27/2019 10:13 AM  
Pulse 95 6/27/2019 10:25 AM  
Resp 12 6/27/2019 10:25 AM  
SpO2 100 % 6/27/2019 10:25 AM

## 2019-06-27 NOTE — OP NOTES
[address] 
OPERATIVE REPORT Name:  Lionel Meehan 
MR#:  [id number] :  1955 ACCOUNT #:  [[id number]] DATE OF SERVICE:  2019 PREOPERATIVE DIAGNOSIS:  Right breast cancer, lower outer quadrant. History of left breast cancer. POSTOPERATIVE DIAGNOSIS:  Right breast cancer, lower outer quadrant. History of left breast cancer. PROCEDURE PERFORMED:  Right ultrasound-guided lumpectomy, right axillary sentinel lymph node biopsy, intraoperative margin assessment using RF spectroscopy. SURGEON:  Austen Cespedes MD 
 
ASSISTANT:  Lila Masters MD 
 
ANESTHESIA:  General. 
 
COMPLICATIONS:  None. SPECIMENS REMOVED: 
1. Right axillary sentinel lymph node #1. 
2.  Right axillary sentinel lymph node #2. 
3.  Right axillary sentinel lymph node #3. 4.  Right breast lumpectomy. 5.  Right breast lateral margin. 6.  Right breast superior margin. 7.  Right breast medial margin. 8.  Right breast deep margin. FINDINGS:  [place] lymph nodes negative. IMPLANTS:  none. ESTIMATED BLOOD LOSS:  10 mL. INDICATIONS FOR PROCEDURE:  This is a 73-year-old female with a newly diagnosed right breast cancer in the lower outer quadrant. She wished to have lumpectomy and sentinel node biopsy. PROCEDURE IN DETAIL:  The patient initially went to Nuclear Medicine where technetium-99 was injected in the right breast.  She tolerated this well. She went to the preop holding area where surgical site was marked by surgeon. Informed consent was obtained. She was taken to the operating room, laid in supine position where LMA anesthesia was induced. Right breast and axilla were prepped and draped in the usual fashion and time-out was performed. Attention was turned to the right axilla. An inferior axillary hairline incision was made with a 10 blade. Bovie cautery was used to dissect through the axillary fascia.   Three sentinel lymph nodes were identified using Neoprobe guidance and excised with LigaSure device. These were sent for frozen section and found to be negative. The background count was less than 10% of the hottest node. The cavity was irrigated, 20 mL of local anesthetic was injected in the axilla. Axillary fascia was closed with interrupted 3-0 Vicryl and skin with 4-0 subcuticular Monocryl. Attention was turned to the right breast at 8 o'clock where the lesion and clip were identified using ultrasound. Curvilinear incision was made with a 10 blade. Bovie cautery was used to dissect down around the lesion all the way to the chest wall. This was excised and marked short superior and long stitch lateral.  Margin probe device was plugged in and calibrated. All six margins were assessed. For additional margins, please see above. The cavity was irrigated. Bovie cautery was used to obtain hemostasis. Local anesthetic 20 mL was injected in the breast and the skin. The deeper tissues were closed with interrupted 2-0 Vicryl and the skin with interrupted 3-0 Vicryl and 4-0 subcuticular Monocryl. Skin glue was placed on incisions as well as a pressure dressing and a bra. All sponge, needle, and instrument counts were correct. The patient went to recovery in stable condition. Paris Medina MD 
 
 
[id number] 
D:  06/27/2019 9:58 T:  06/27/2019 10:08 JOB #:  [id number]

## 2019-06-27 NOTE — BRIEF OP NOTE
BRIEF OPERATIVE NOTE Date of Procedure: 6/27/2019 Preoperative Diagnosis: RIGHT BREAST CANCER lower outer quadrant, HX OF LEFT BREAT CANCER Postoperative Diagnosis: RIGHT BREAST CANCER lower outer quadrant, HX OF LEFT BREAT CANCER Procedure(s): RIGHT BREAST LUMPECTOMY, RIGHT BREAST SENTINEL NODE BIOPSY SENTINEL NODE BIOPSY Surgeon(s) and Role: Jewel Wakefield MD - Primary Surgical Assistant: omari miles Surgical Staff: 
Circ-1: Everett Rivers RN Scrub Tech-1: Anayeli Hess, [address] [place] [address] Surg Asst-1: Francia Dent Event Time In Time Out Incision Start 4290 Incision Close Anesthesia: General  
Estimated Blood Loss: 10 ml Specimens:  
ID Type Source Tests Collected by Time Destination 1 : RIGHT AXILLARY SENTINEL NODE #1 Frozen Section Axillary  Winston Powers MD 6/27/2019 0154 Pathology 2 : RIGHT AXILLARY SENTINEL NODE #2 Frozen Section Abner Powers MD 6/27/2019 6316 Pathology 3 : RIGHT AXILLARY SENTINEL NODE #3 Frozen Section Abner Powers MD 6/27/2019 4308 Pathology 4 : RIGHT BREAST LUMPECTOMY Preservative Breast  Winston Powers MD 6/27/2019 8783 Pathology 5 : RIGHT BREAST LATERAL MARGIN Preservative Breast  Winston Powers MD 6/27/2019 8675 Pathology 6 : RIGHT BREAST SUPERIOR MARGIN Preservative Breast  Winston Powers MD 6/27/2019 9601 Pathology 7 : RIGHT BREAST MEDIAL MARGIN Preservative Breast  Winston Powers MD 6/27/2019 3563 Pathology 8 : RIGHT BREAST DEEP MARGIN Preservative Breast  Winston Powers MD 6/27/2019 7783 Pathology Findings: sln negative Complications: none Implants: * No implants in log * Dictated [id number] Pulmonary Critical Care Progress Note    Chief Complaint: altered level of consciousness, sepsis, CHF exacerbation.     HPI   The patient is a very unfortunate 55-year-old male with a past medical history significant for end-stage heart failure with an ejection fraction less than 15% secondary to cocaine abuse with associated chronic pulmonary edema, intraventricular clot, cirrhosis of the liver as well as pulmonary embolism. He was brought into the emergency department by EMS from the [facility] where he presented to follow up with a new primary care doctor and was found to have abnormal vital signs and EMS was called. Per report, he has had over the last 2-3 weeks, worsening lower extremity as well as abdominal swelling with a 10-pound unintentional weight gain and intermittent confusion. He has also noted increased shortness of breath over the last several days. In the emergency department, patient was started on BiPAP by the emergency physician. We were consulted for assistance in his management. He was maintaining normal blood pressure at the time of my evaluation, but had other signs of decompensated heart failure. A bedside limited ultrasound of his heart revealed a dilated inferior vena cava of 2.2 cm without respiratory variability and biventricular enlargement as well severely depressed function. No obvious pericardial effusion was seen. Of note, he was recently admitted back in January of this year for similar symptoms including decompensated heart failure and he was initially planned [facility] hosp[facility]; however, there was some discussion with the nurse per the family that he would follow up with Dr. Ortiz who might have additional options for his improvement and buy him another year or two of life. He had not followed up with her and per his son, they did not even feel all the medications he was discharged on including blood thinners and his Aldactone because of the price and they were hoping to see   Angel beforehand to ask her about it. Patient denies any recent chest pain, no recent travel and he has been sedentary for the last month and a half.       Interval Events:  24 hour interval history reviewed  Overnight events: 2 seizures overnight, agonal breathing noted. Now hypotensive and unresponsive. Bleeding from NGT noted  Mobility: none    Reason for visit:  Acute on chronic systolic heart failure      PFSH:  No change.    Respiratory:   5L FM  Pulse Oximetry: 97 %  Exam: Coarse breath sounds throughout. 3+ anasarca. Agonal breathing pattern  CXR (personally reviewed) with no new imaging for today.   No blood gas for review.    HemoDynamics:  Pulse: 83, Heart Rate (Monitored): 83  NIBP: (!) 69/39 mmHg  CVP (mm Hg): (!) 30 MM HG  Levophed 30. Dobutamine 5 mg  Exam: NSR, no murmur  Imaging: CONCLUSIONS  Compared to the images of the prior study done on 01/29/17-  there has   been no significant change.   Severely reduced left ventricular systolic function.  Left ventricular ejection fraction is visually estimated to be 20%.  Global hypokinesis.  Diastolic function is difficult to assess.  Moderately dilated right ventricle.  Pacer/ICD wire seen in right ventricle.  Moderate mitral regurgitation.  Mild tricuspid regurgitation.  Right ventricular systolic pressure is estimated to be 40 mmHg.     Neuro:  Totally obtunded, no reponse to painful stimuli, eyes 5 mm and minimally reactive.   Images reviewed.     Fluids:  Intake/Output       04/04/17 0700 - 04/05/17 0659 04/05/17 0700 - 04/06/17 0659 04/06/17 0700 - 04/07/17 0659      8041-1146 6249-6349 Total 7968-1413 0298-6300 Total 0028-3146 3758-9894 Total       Intake    I.V.  1130.4  1083.6 2214  1155.9  180.6 1336.5  --  -- --    Norepinephrine Volume 619.4 675.6 1295 747.9 112.6 860.5 -- -- --    Dobutamine Volume 408 408 816 408 68 476 -- -- --    IV Volume (NS Bolus) 100 -- 100 -- -- -- -- -- --    IV Volume (albumin) 3 -- 3 -- -- -- -- -- --    Other  --   90 90  60  -- 60  --  -- --    Medications (P.O./ Enteral Liquids) -- 90 90 60 -- 60 -- -- --    Enteral  540  570 1110  540  80 620  --  -- --    Enteral Volume 480 480 960 480 80 560 -- -- --    Free Water / Tube Flush 60 90 150 60 -- 60 -- -- --    Total Intake 1670.4 1743.6 3414 1755.9 260.6 2016.5 -- -- --       Output    Urine  5  -- 5  5  5 10  --  -- --    Indwelling Cathether 5 -- 5 5 5 10 -- -- --    Stool/Urine  --  -- --  300  -- 300  --  -- --    Measurable Stool (ml) -- -- -- 300 -- 300 -- -- --    Stool  --  -- --  --  -- --  --  -- --    Number of Times Stooled 3 x -- 3 x -- -- -- -- -- --    Total Output 5 -- 5 305 5 310 -- -- --       Net I/O     1665.4 1743.6 3409 1450.9 255.6 1706.5 -- -- --           Recent Labs      17   0340   SODIUM  125*  125*  127*   POTASSIUM  6.6*  5.3  5.8*   CHLORIDE  98  97  98   CO2  14*  12*  14*   BUN  143*  143*  155*   CREATININE  5.22*  5.58*  6.60*   CALCIUM  9.0  8.6  7.7*       GI/Nutrition:  Distended, non-tender, soft, positive fluid shift, hypoactive sounds. NG tube with blood output. Rectal tube in place  Images reviewed.   Liver Function  Recent Labs      17   0340   ALTSGPT  27  26  25   ASTSGOT  55*  47*  48*   ALKPHOSPHAT  51  56  55   TBILIRUBIN  4.7*  3.8*  3.1*   GLUCOSE  109*  136*  99       Heme:  Recent Labs      175  17   0340   RBC  4.16*  3.83*  2.94*   HEMOGLOBIN  10.8*  10.0*  7.9*   HEMATOCRIT  36.2*  33.6*  26.6*   PLATELETCT  178  152*  132*       Infectious Disease:  Temp  Av.2 °C (97.1 °F)  Min: 35.7 °C (96.3 °F)  Max: 36.5 °C (97.7 °F)  Reviewed.  Afebrile   No antibiotics.   Recent Labs      17   0315  17   0455  17   0340   WBC  15.8*  13.8*  12.6*   NEUTSPOLYS  87.00*  85.20*  85.30*   LYMPHOCYTES  3.70*  2.60*  3.40*   MONOCYTES  7.30  6.10  9.10   EOSINOPHILS  0.10  0.00  0.20   BASOPHILS  0.30   0.90  0.10   ASTSGOT  55*  47*  48*   ALTSGPT  27  26  25   ALKPHOSPHAT  51  56  55   TBILIRUBIN  4.7*  3.8*  3.1*     Current Facility-Administered Medications   Medication Dose Frequency Provider Last Rate Last Dose   • lorazepam (ATIVAN) injection 2 mg  2 mg Q HOUR PRN Jeremy M Gonda, M.D.   2 mg at 04/06/17 0300   • norepinephrine (LEVOPHED) 16 mg in  mL Infusion  0.5-30 mcg/min Continuous Keenan Moss PHARMD 56.3 mL/hr at 04/06/17 0039 30 mcg/min at 04/06/17 0039   • omeprazole 2 mg/mL in sodium bicarbonate (PRILOSEC) oral susp 40 mg  40 mg Q12HRS Jeremy M Gonda, M.D.   40 mg at 04/05/17 2120   • furosemide (LASIX) 100 mg in  mL infusion  10 mg/hr Continuous Fadi Najjar, M.D.   Stopped at 04/04/17 1315   • midodrine (PROAMATINE) tablet 5 mg  5 mg TID WITH MEALS Jeremy M Gonda, M.D.   5 mg at 04/05/17 1635   • haloperidol lactate (HALDOL) injection 5 mg  5 mg Q4HRS PRN David Pack M.D.       • DOBUTamine (DOBUTREX) 1 mg/mL premix infusion  5 mcg/kg/min Continuous Nikhil Nunez M.D. 34 mL/hr at 04/05/17 2320 5 mcg/kg/min at 04/05/17 2320   • glucose 4 g chewable tablet 16 g  16 g Q15 MIN PRN Gavin Molina M.D.        And   • dextrose 50% (D50W) injection 25 mL  25 mL Q15 MIN PRN Gavin Molina M.D.       • gabapentin (NEURONTIN) capsule 300 mg  300 mg BID Nikhil Nunez M.D.   300 mg at 04/05/17 2120   • lactulose 20 GM/30ML solution 30 mL  30 mL BID Gavin Molina M.D.   30 mL at 04/05/17 2120   • rifaximin (XIFAXAN) tablet 550 mg  550 mg BID Everton Paredes M.D.   550 mg at 04/05/17 2120   • oxycodone immediate-release (ROXICODONE) tablet 2.5-5 mg  2.5-5 mg Q4HRS PRN Alexia Munoz D.O.   2.5 mg at 04/01/17 0212   • Respiratory Care per Protocol   Continuous RT Alexis Hamilton M.D.       • ondansetron (ZOFRAN) syringe/vial injection 4 mg  4 mg Q4HRS PRN Alexis Hamilton M.D.   4 mg at 03/20/17 1821   • ondansetron (ZOFRAN ODT) dispertab 4 mg  4 mg Q4HRS PRN  Alexis Hamilton M.D.       • promethazine (PHENERGAN) tablet 12.5-25 mg  12.5-25 mg Q4HRS PRN Alexis Hamilton M.D.       • promethazine (PHENERGAN) suppository 12.5-25 mg  12.5-25 mg Q4HRS PRN Alexis Hamilton M.D.       • prochlorperazine (COMPAZINE) injection 5-10 mg  5-10 mg Q4HRS PRN Alexis Hamilton M.D.       • acetaminophen (TYLENOL) tablet 650 mg  650 mg Q6HRS PRN Alexis Hamilton M.D.   650 mg at 03/20/17 0838   • senna-docusate (PERICOLACE or SENOKOT S) 8.6-50 MG per tablet 2 Tab  2 Tab BID Alexis Hamilton M.D.   2 Tab at 04/05/17 2100    And   • polyethylene glycol/lytes (MIRALAX) PACKET 1 Packet  1 Packet QDAY PRN Alexis Hamilton M.D.        And   • magnesium hydroxide (MILK OF MAGNESIA) suspension 30 mL  30 mL QDAY PRN Alexis Hamilton M.D.        And   • bisacodyl (DULCOLAX) suppository 10 mg  10 mg QDAY PRN Alexis Hamilton M.D.       • POLYMEM ALGINATE DRESSING PADS 1 Each  1 Each QDAY PRN Jeremy M Gonda, M.D.         Last reviewed on 3/13/2017  5:30 PM by Loi Ponce    Quality  Measures:  Labs reviewed, Medications reviewed and Radiology images reviewed  Holt catheter: Critically Ill - Requiring Accurate Measurement of Urinary Output  Central line in place: Need for access    DVT Prophylaxis: Contraindicated - High bleeding risk  DVT prophylaxis - mechanical: SCDs  Ulcer prophylaxis: Not indicated    Assessed for rehab: Patient unable to tolerate rehabilitation therapeutic regimen      Assessment and Plan:  Acute on chronic hypoxemic respiratory failure - unchanged   - cont O2/RT protocols  Acute on chronic systolic heart failure   - EF 15-20%   - cont dobutamine gtt  Shock - combined cardiogenic with vasodistributive    - cont levo + dobutamine, midodrine   Acute cardiogenic pulmonary edema - kidneys unresponsive to lasix, worsening  Acute metabolic/toxic encephalopathy - worsening   - lactulose and rifaximin  Sepsis - skin source  Seizures - ativan prn + keppra  Bilateral lower extremity cellulitis with  open ulcerations   - completed Zosyn per ID  Thrombocytopenia  Acute on chronic kidney disease - anuric with associated hyperkalemia acidosis and fluid overload   - renal consulted and risks>benefits of HD, continue medical management  Hepatitis C cirrhosis  UGIB - all anticoagulation stopped   - follow Hgb with conservative transfusion strategy  Acute pancreatitis   LV thrombus - anticoagulation now contraindicated due to blood in stool  Prophylaxis, TFs, DNR/DNI, dismal prognosis with likely death in 1-2 days    Critical Care Time:  33 minutes  18223  No time overlap  Time excludes procedures  Discussed with RN, RT, Team, family    Magaly KEEN (Lisseth), am scribing for, and in the presence of, Dr. Gonda M.D.   Electronically signed by: Magaly Cortez (Lisseth), 4/6/2017  I, Dr. Gonda, M.D. personally performed the services described in this documentation, as scribed by Magaly Cortez in my presence, and it is both accurate and complete.

## 2019-06-27 NOTE — PERIOP NOTES
Permission received to review discharge instructions and discuss private health information with Kade Workman, .  
 
Pt has dementia and  is here with pt

## 2019-06-27 NOTE — PERIOP NOTES
Nena Burkett 1955 
[id number] Situation: 
Verbal report given from: WAYNE Vogel CRNA, NGOC Coreas RN Procedure: Procedure(s): RIGHT BREAST LUMPECTOMY, RIGHT BREAST SENTINEL NODE BIOPSY SENTINEL NODE BIOPSY Background: 
 
Preoperative diagnosis: RIGHT BREAST CANCER, HX OF LEFT BREAT CANCER Postoperative diagnosis: RIGHT BREAST CANCER, HX OF LEFT BREAT CANCER :  Dr. Merrell Goltz Assistant(s): Circ-1: Everett Rivers RN Scrub Tech-1: Anayeli Hess, [address] [place] [address] Surg Asst-1: Francia Dent Specimens:  
ID Type Source Tests Collected by Time Destination 1 : RIGHT AXILLARY SENTINEL NODE #1 Frozen Section Abner Powers MD 6/27/2019 5587 Pathology 2 : RIGHT AXILLARY SENTINEL NODE #2 Frozen Section Axillary  Winston Powers MD 6/27/2019 0610 Pathology 3 : RIGHT AXILLARY SENTINEL NODE #3 Frozen Section Abner Powers MD 6/27/2019 0049 Pathology 4 : RIGHT BREAST LUMPECTOMY Preservative Breast  Winston Powers MD 6/27/2019 3436 Pathology 5 : RIGHT BREAST LATERAL MARGIN Preservative Breast  Winston Powers MD 6/27/2019 6935 Pathology 6 : RIGHT BREAST SUPERIOR MARGIN Preservative Breast  Winston Powers MD 6/27/2019 9606 Pathology 7 : RIGHT BREAST MEDIAL MARGIN Preservative Breast  Winston Powers MD 6/27/2019 4485 Pathology 8 : RIGHT BREAST DEEP MARGIN Preservative Breast  Winston Powers MD 6/27/2019 5105 Pathology Assessment: 
Intra-procedure medications Anesthesia gave intra-procedure sedation and medications, see anesthesia flow sheet Intravenous fluids: Lexa Nena Vital signs stable Recommendation:

## 2019-06-27 NOTE — ANESTHESIA PREPROCEDURE EVALUATION
Relevant Problems No relevant active problems Anesthetic History No history of anesthetic complications Review of Systems / Medical History Patient summary reviewed, nursing notes reviewed and pertinent labs reviewed Pulmonary Within defined limits Neuro/Psych Dementia (Alzheimer's) Cardiovascular Hyperlipidemia Exercise tolerance: >4 METS 
  
GI/Hepatic/Renal 
Within defined limits Endo/Other Cancer (Left breast 2008, right breast currently) Other Findings Physical Exam 
 
Airway Mallampati: II 
TM Distance: 4 - 6 cm Neck ROM: normal range of motion Mouth opening: Normal 
 
 Cardiovascular Rhythm: regular Rate: normal 
 
 
Pertinent negatives: No murmur Dental 
 
Dentition: Lower partial plate Pulmonary Breath sounds clear to auscultation Abdominal 
GI exam deferred Other Findings Anesthetic Plan ASA: 3 Anesthesia type: general 
 
 
 
 
Induction: Intravenous Anesthetic plan and risks discussed with: Patient and Spouse

## 2019-06-27 NOTE — H&P
HISTORY OF PRESENT ILLNESS Megan Kay is a 66 y.o. female. HPI NEW patient consult referred by  Rafael Archibald NP for new RIGHT breast cancer. This was found on screening mammogram, no palpable lumps, nipple drainage/inversion or skin dimpling, according to her , who is here with her as she has dementia. She has a history of LEFT breast cancer, 2008, having lumpectomy, chemo and XRT at that time. She has progressive alzheimer's dementia.  
  
Menarche 17, LMP unknown, # of children 1, age of 1st delivery 14, Hysterectomy/oophorectomy yes/yes, Breast bx yes-LEFT 2008, breast cancer, history of breast feeding no, BCP no, Hormone therapy no 
  
  
Family History: 
Sister, breast cancer, age 43, survivor 
  
JAVIER Results (most recent): 
    
Results from Hospital Encounter encounter on 04/23/19 JAVIER POST BX IMAGING RT INCL CAD  
  Addendum Addendum: ADDENDUM TO ultrasound guided right breast biopsy performed  4/23/2019. PATHOLOGY RESULTS: Invasive ductal carcinoma. RESULTS CONVEYED: Patient's  informed by Dr. Anastacio Suresh 4/24/2019. ASSESSMENT: These results are concordant with the imaging findings. RECOMMENDATIONS: Patient has been provided a surgical consultation  appointment with Dr. Juan Carlos Sethi 5/15/2019. Lili Talavera MD 4/24/2019  3:33 PM        
  Narrative STUDY:  ULTRASOUND-GUIDED CORE NEEDLE BIOPSY OF THE RIGHT BREAST 
  
INDICATION: Right breast mass. 
  
PROCEDURE: The risks and benefits of the procedure were explained to the patient, questions 
were answered, and informed consent was obtained. 
  
The mass at 8-not o'clock in the right breast was localized with ultrasound. The breast was prepped in the usual sterile manner. Following the 
administration of a local anesthetic and dermatotomy, and using ultrasound 
guidance, 12 gauge vacuum-assisted Celero needle was advanced to the lesion, and 
3 cores were obtained. Pre and post-fire images confirmed accurate needle trajectory. A metallic clip was placed at the biopsy site. Post-biopsy 
digital mammogram confirms the presence of the clip at the intended biopsy site. The patient tolerated the procedure well without complication. Final pathology is pending. 
   
  Impression IMPRESSION:  
Successful right breast ultrasound-guided biopsy. 
  
   
  
US Results (most recent): 
    
Results from Hospital Encounter encounter on 04/23/19 US BX BREAST VAC RT 1ST LESION W/CLIP AND SPECIMEN  
  Addendum Addendum: ADDENDUM TO ultrasound guided right breast biopsy performed  4/23/2019. PATHOLOGY RESULTS: Invasive ductal carcinoma. RESULTS CONVEYED: Patient's  informed by Dr. Kassidy Bailey 4/24/2019. ASSESSMENT: These results are concordant with the imaging findings. RECOMMENDATIONS: Patient has been provided a surgical consultation  appointment with Dr. Rubi Vicente 5/15/2019. Duy Shields MD 4/24/2019  3:33 PM        
  Narrative STUDY:  ULTRASOUND-GUIDED CORE NEEDLE BIOPSY OF THE RIGHT BREAST 
  
INDICATION: Right breast mass. 
  
PROCEDURE: The risks and benefits of the procedure were explained to the patient, questions 
were answered, and informed consent was obtained. 
  
The mass at 8-not o'clock in the right breast was localized with ultrasound. The breast was prepped in the usual sterile manner. Following the 
administration of a local anesthetic and dermatotomy, and using ultrasound 
guidance, 12 gauge vacuum-assisted Celero needle was advanced to the lesion, and 
3 cores were obtained. Pre and post-fire images confirmed accurate needle 
trajectory. A metallic clip was placed at the biopsy site. Post-biopsy 
digital mammogram confirms the presence of the clip at the intended biopsy site. The patient tolerated the procedure well without complication.   
Final pathology is pending. 
   
  Impression IMPRESSION:  
Successful right breast ultrasound-guided biopsy. 
  
   
  
FINAL PATHOLOGIC DIAGNOSIS  
 Right breast, mass, ultrasound-guided needle core biopsy:  
Invasive ductal carcinoma, nuclear grade 2, present within two cores up to 8 mm in greatest individual  
length  
  
ESTROGEN AND PROGESTERONE RECEPTOR ASSAY  
  
Interpretation Invasive Component Estrogen receptor 100% strong staining POSITIVE Progesterone receptor Pending  repeating due to technical issue In-situ Non-invasive Component Estrogen receptor Not Applicable Progesterone receptor Not Applicable  
  
Interpretation Progesterone Receptor Assay IHC: 0% staining NEGATIVE  
   
   
  
Her2 Breast Date Ordered: 5/6/2019 Status: Signed Out Date Complete: 5/6/2019 By: Courtney Bass Date Reported: 5/6/2019 Interpretation FLUORESCENT IN SITU HYBRIDIZATION - HER2 Breast  
Results: Negative  
  
    
Past Medical History:  
Diagnosis Date  Breast cancer ([facility] [address] 2008  
  Left  Cancer ([facility] [address]    
 Hyperlipidemia 6/30/2017  Memory disorder    
 Radiation therapy complication    
  Lt breast 2008--no chemo  
  
     
Past Surgical History:  
Procedure Laterality Date  HX BREAST LUMPECTOMY Left 2008  JAVIER US BX BREAST LT 1ST LESION W/CLIP AND SPECIMEN Left 2008  
  multiple areas sampled--one area breast CA  
  
Social History  
  
     
Socioeconomic History  Marital status:   
    Spouse name: Not on file  Number of children: Not on file  Years of education: Not on file  Highest education level: Not on file Occupational History  Not on file Social Needs  Financial resource strain: Not on file  Food insecurity:  
    Worry: Not on file  
    Inability: Not on file  Transportation needs:  
    Medical: Not on file  
    Non-medical: Not on file Tobacco Use  Smoking status: Never Smoker  Smokeless tobacco: Never Used Substance and Sexual Activity  Alcohol use: No  
 Drug use: No  
 Sexual activity: Not on file Lifestyle  Physical activity:  
    Days per week: Not on file     Minutes per session: Not on file  Stress: Not on file Relationships  Social connections:  
    Talks on phone: Not on file  
    Gets together: Not on file  
    Attends [employer] service: Not on file  
    Active member of club or organization: Not on file  
    Attends meetings of clubs or organizations: Not on file  
    Relationship status: Not on file  Intimate partner violence:  
    Fear of current or ex partner: Not on file  
    Emotionally abused: Not on file  
    Physically abused: Not on file  
    Forced sexual activity: Not on file Other Topics Concern  Not on file Social History Narrative  Not on file  
  
OB History None 
  
  Obstetric Comments Menarche 17, LMP unknown, # of children 1, age of 4st delivery 14, Hysterectomy/oophorectomy yes/yes, Breast bx yes-LEFT 2008, breast cancer, history of breast feeding no, BCP no, Hormone therapy no  
  
   
  
  
Current Outpatient Medications:  
  pravastatin (PRAVACHOL) 10 mg tablet, take 1 tablet by mouth at bedtime, Disp: 30 Tab, Rfl: 6 
  memantine (NAMENDA) 10 mg tablet, 1 po bid, Disp: 60 Tab, Rfl: 5 
  aspirin delayed-release 81 mg tablet, Take 1 Tab by mouth daily. , Disp: 30 Tab, Rfl: 0 
  donepezil (ARICEPT) 10 mg tablet, Take 1 Tab by mouth nightly., Disp: 30 Tab, Rfl: 5 
  memantine (NAMENDA) 5 mg tablet, 1 p.o. daily for 30 days and then 1 p.o. twice daily. Indications: MODERATE TO SEVERE ALZHEIMER'S TYPE DEMENTIA, Disp: 60 Tab, Rfl: 5 Allergies Allergen Reactions  Pcn [Penicillins] Hives  
  
  
Review of Systems Psychiatric/Behavioral: Positive for memory loss. All other systems reviewed and are negative. 
  
  
Physical Exam  
Constitutional: She is oriented to person, place, and time. She appears well-developed and well-nourished. HENT:  
Head: Normocephalic. Eyes: EOM are normal.  
Neck: Neck supple. Cardiovascular: Intact distal pulses. Pulmonary/Chest: Effort normal. Right breast exhibits mass (8:00 1 cm mass). Right breast exhibits no inverted nipple, no nipple discharge, no skin change and no tenderness. Left breast exhibits no inverted nipple, no mass, no nipple discharge, no skin change and no tenderness. Breasts are symmetrical.  
 
 
Abdominal: Soft. Musculoskeletal: Normal range of motion. Lymphadenopathy:  
  She has no cervical adenopathy. She has no axillary adenopathy. Neurological: She is alert and oriented to person, place, and time. Skin: Skin is warm and dry. Psychiatric: She has a normal mood and affect. Nursing note and vitals reviewed. 
  
  
BREAST ULTRASOUND, Preop planning Indication:preop planning  right Breast 8:00 Technique: The area was scanned using a high-frequency linear-array near-field transducer Findings: 1.2 cm  irregular mass with dropout Impression: Biopsy site visible with ultrasound Disposition:  Will schedule lumpectomy with sentinel lymph node biopsy ASSESSMENT and PLAN 
    ICD-10-CM ICD-9-CM    
1. History of left breast cancer Z85.3 V10.3 BRAC-ANALYSIS 2. Malignant neoplasm of right breast in female, estrogen receptor positive, unspecified site of breast ([facility] C50.911 174.9 BRAC-ANALYSIS  
  Z17.0 V86.0    
3. Family history of breast cancer Z80.3 V16.3 BRAC-ANALYSIS  
  
67 yo female with right breast T1 N0 IDC Er/Pr + Her 2 arvind negative. She is here with her family. I have reviewed the imaging and pathology with her and she was given copies of these reports. 
  
90 minutes were spent face-to-face with the patient during this encounter and 90% of that time was spent on counseling and coordination of care. 1. Discussed lumpectomy and radiation vs mastectomy. Discussed reconstruction. 2. Discussed sentinel lymph node biopsy. 3. Discussed external beam radiation. 4. Discussed hormone therapy.  
5. Discussed the possibility of chemotherapy.  
  
 Right us guided lumpectomy, sln biopsy

## 2019-06-27 NOTE — PERIOP NOTES
Patient: Titus Peralta MRN: [id number]  SSN: [id number] YOB: 1955  Age: 66 y.o. Sex: female Patient is status post Procedure(s): RIGHT BREAST LUMPECTOMY, RIGHT BREAST SENTINEL NODE BIOPSY SENTINEL NODE BIOPSY. Surgeon(s) and Role: Ruperto Hodgkin, MD - Primary Local/Dose/Irrigation:  SEE MAR Peripheral IV 06/27/19 Left Wrist (Active) Site Assessment Clean, dry, & intact 6/27/2019  8:24 AM  
Phlebitis Assessment 0 6/27/2019  8:24 AM  
Infiltration Assessment 0 6/27/2019  8:24 AM  
Dressing Status New 6/27/2019  8:24 AM  
Dressing Type Transparent;Tape 6/27/2019  8:24 AM  
Hub Color/Line Status Pink; Infusing 6/27/2019  8:24 AM  
        
Airway - Endotracheal Tube 06/27/19 Oral (Active) Airway - Endotracheal Tube 06/27/19 Oral (Active) Dressing/Packing:  Wound Breast Right-Dressing Type: 4 x 4;Bra;Topical skin adhesive/glue (06/27/19 0900) Wound Axilla Right-Dressing Type: 4 x 4;Bra;Topical skin adhesive/glue (06/27/19 0900) INTRAOPERATIVE MARGIN ASSESSMENT USING MARGIN PROBE.

## 2019-06-28 ENCOUNTER — TELEPHONE (OUTPATIENT)
Dept: SURGERY | Age: 64
End: 2019-06-28

## 2019-06-28 NOTE — TELEPHONE ENCOUNTER
I called the patient to check on her POD #1 RIGHT lumpectomy SLNB. She is doing well. Has taken pain medication X1 with relief. Using ice in the axilla area. The patient was reminded of her post op appointment date and time. 7/17/2019 @ 3:00pm. She was appreciative of the call.

## 2019-07-09 DIAGNOSIS — Z85.3 HISTORY OF CANCER OF LEFT BREAST: ICD-10-CM

## 2019-07-09 DIAGNOSIS — C50.911 DUCTAL CARCINOMA OF BREAST, STAGE 1, ESTROGEN RECEPTOR POSITIVE, RIGHT (HCC): ICD-10-CM

## 2019-07-09 DIAGNOSIS — Z17.0 DUCTAL CARCINOMA OF BREAST, STAGE 1, ESTROGEN RECEPTOR POSITIVE, RIGHT (HCC): ICD-10-CM

## 2019-07-09 DIAGNOSIS — Z80.3 FAMILY HISTORY OF BREAST CANCER: Primary | ICD-10-CM

## 2019-07-17 ENCOUNTER — OFFICE VISIT (OUTPATIENT)
Dept: SURGERY | Age: 64
End: 2019-07-17

## 2019-07-17 VITALS
DIASTOLIC BLOOD PRESSURE: 81 MMHG | HEART RATE: 52 BPM | WEIGHT: 134 LBS | HEIGHT: 66 IN | SYSTOLIC BLOOD PRESSURE: 135 MMHG | BODY MASS INDEX: 21.53 KG/M2

## 2019-07-17 DIAGNOSIS — C50.511 MALIGNANT NEOPLASM OF LOWER-OUTER QUADRANT OF RIGHT FEMALE BREAST, UNSPECIFIED ESTROGEN RECEPTOR STATUS (HCC): Primary | ICD-10-CM

## 2019-07-17 DIAGNOSIS — Z15.89 BIALLELIC MUTATION OF ATM GENE: ICD-10-CM

## 2019-07-17 NOTE — PATIENT INSTRUCTIONS
Lumpectomy: What to Expect at [address] [place] [address]    For 1 or 2 days after the surgery you will probably feel tired and have some pain. The skin around the cut (incision) may feel firm, swollen, and tender, and be bruised. Tenderness should go away in about 2 or 3 days, and the bruising within 2 weeks. Firmness and swelling may last for 3 to 6 months. You may feel a soft lump in your breast that gradually turns hard. This is the incision healing. It is not cancer. Women should wear a well-fitted and supportive bra, even during the night, for 1 week. You will probably be able to go back to work or your normal routine in 1 to 3 weeks after the surgery. This may depend on whether you have more treatment. Your doctor may have removed some lymph nodes in your armpit to see if the cancer has spread. If so, you may feel either numbness or tingling (\"pins and needles\") in your armpit or on the inside of your upper arm. This should improve over the next several weeks. Some people have numbness for a longer time. When you find out that you have cancer, you may feel many emotions and may need some help coping. Seek out family, friends, and counselors for support. You also can do things at home to make yourself feel better while you go through treatment. Call the [employer] (0-397.579.4793) or visit its website at [address] [employer] for more information. This care sheet gives you a general idea about how long it will take for you to recover. But each person recovers at a different pace. Follow the steps below to get better as quickly as possible. How can you care for yourself at home? Activity    · Rest when you feel tired. Getting enough sleep will help you recover. You may want to sleep on the side that has not been operated on.  A woman may want to use a pillow to support the affected breast while lying on her side.     · Avoid strenuous activities, such as biking, jogging, weightlifting, or aerobic exercise, for 1 month or until your doctor says it is okay. This may include housework, such as washing windows, especially if you have to use the arm next to the affected breast.     · Most people can return to their normal activities within 2 weeks.     · Try to walk each day. Start out by walking a little more than you did the day before. Bit by bit, increase the amount you walk. Walking boosts blood flow and helps prevent pneumonia and constipation.     · For 1 to 2 weeks, avoid lifting anything over 10 to 15 pounds or that would make you strain. This may include heavy grocery bags and milk containers, a heavy briefcase or backpack, cat litter or dog food bags, a vacuum , or a child.     · You may drive when you are no longer taking pain medicine and can use your arm without pain. Talk to your doctor about when to start driving, especially if you are having radiation treatments.     · You will probably be able to go back to work or your normal routine in 1 to 3 weeks. It may be longer, depending on the type of work you do and whether you are having radiation or chemotherapy.     · You may shower 24 to 48 hours after surgery, if your doctor okays it. Pat the incision dry. Do not take a bath for the first 2 weeks, or until your doctor tells you it is okay. Diet    · You can eat your normal diet. If your stomach is upset, try bland, low-fat foods like plain rice, broiled chicken, toast, and yogurt.     · You may notice that your bowel movements are not regular right after your surgery. This is common. Try to avoid constipation and straining with bowel movements. You may want to take a fiber supplement every day. If you have not had a bowel movement after a couple of days, ask your doctor about taking a mild laxative. Medicines    · Your doctor will tell you if and when you can restart your medicines.  He or she will also give you instructions about taking any new medicines.     · If you take blood thinners, such as warfarin (Coumadin), clopidogrel (Plavix), or aspirin, be sure to talk to your doctor. He or she will tell you if and when to start taking those medicines again. Make sure that you understand exactly what your doctor wants you to do.     · Take pain medicines exactly as directed. ? Your doctor may have given you a medicine to numb the area inside and around your cut (incision). The numbness will last from 6 to 12 hours. If you went home right after the surgery, you may want to take pain medicine before this wears off.  ? If the doctor gave you a prescription medicine for pain, take it as prescribed. ? If you are not taking a prescription pain medicine, ask your doctor if you can take an over-the-counter medicine.     · If your doctor prescribed antibiotics, take them as directed. Do not stop taking them just because you feel better. You need to take the full course of antibiotics.     · If you think your pain medicine is making you sick to your stomach:  ? Take your medicine after meals (unless your doctor has told you not to). ? Ask your doctor for a different pain medicine. Incision care    · If you have strips of tape on the cut the doctor made (incision), leave the tape on for a week or until it falls off.     · When you can shower, wash the area daily with warm, soapy water and pat it dry. Follow-up care is a key part of your treatment and safety. Be sure to make and go to all appointments, and call your doctor if you are having problems. It's also a good idea to know your test results and keep a list of the medicines you take. When should you call for help? Call 911 anytime you think you may need emergency care.  For example, call if:    · You passed out (lost consciousness).     · You have chest pain, are short of breath, or cough up blood.    Call your doctor now or seek immediate medical care if:    · You are sick to your stomach or cannot drink fluids.     · You cannot pass stools or gas.     · You have pain that does not get better after you take your pain medicine.     · You have loose stitches, or your incision comes open.     · Bright red blood has soaked through the bandage over your incision.     · You have signs of a blood clot in your leg (called a deep vein thrombosis), such as:  ? Pain in your calf, back of the knee, thigh, or groin. ? Redness or swelling in your leg.     · You have signs of infection, such as:  ? Increased pain, swelling, warmth, or redness. ? Red streaks leading from the incision. ? Pus draining from the incision. ? A fever.    Watch closely for changes in your health, and be sure to contact your doctor if:    · You have any problems.     · You have new or worse swelling or pain in your arm. Where can you learn more? Go to http://shelby-shanika.info/. Enter D222 in the search box to learn more about \"Lumpectomy: What to Expect at Home. \"  Current as of: March 27, 2018  Content Version: 11.9  © 9509-6331 [employer]. Care instructions adapted under license by [employer] (which disclaims liability or warranty for this information). If you have questions about a medical condition or this instruction, always ask your healthcare professional. [address] any warranty or liability for your use of this information.

## 2019-07-17 NOTE — PROGRESS NOTES
HISTORY OF PRESENT ILLNESS  Xuan Agustin is a 66 y.o. female. HPI ESTABLISHED patient here for post op visit s/p RIGHT breast lumpectomy and RIGHT SLNB 6/27/19. Having some pain in RIGHT breast. Taking Tylenol. She has progressive alzheimer's dementia. Her  is here with her. Breast cancer-  She has a history of LEFT breast cancer, 2008, having lumpectomy, chemo and XRT at that time. 4/23/19 - 65 yo female with right breast T1 N0 IDC Er/Pr + Her 2 arvind negative. 6/27/19 - LEFT breast lumpectomy and LEFT SLNB. Surg path T1 N0, Margins clear    Family History-  Sister, breast cancer, age 43, survivor. Patient has gene mutation MANJEET. Has appt with Swrve in September 2019. FINAL PATHOLOGIC DIAGNOSIS   1. Right axillary sentinel node #1, biopsy:   One (1) lymph node, negative for carcinoma (levels and cytokeratin stain examined)   2. Right axillary sentinel node #2, biopsy:   One (1) lymph node, negative for carcinoma (levels and cytokeratin stain examined)   3. Right axillary sentinel node #3, biopsy:   One (1) lymph node, negative for carcinoma (levels and cytokeratin examined)   4. Right breast, lumpectomy without wire-guided localization:   Invasive ductal carcinoma, 1.6 cm, grade 3   Ductal carcinoma in situ, a few involved ducts, nuclear grade 3, cribriform pattern   Biopsy site   See synoptic report   5. Right breast lateral margin, excision:   Ductal carcinoma in situ, single involved duct   New lateral margin: 0.4 cm   6. Right breast superior margin, excision:   Benign fatty breast showing fibrocystic changes; negative for carcinoma   7. Right breast medial margin, excision:   Benign breast, negative for carcinoma   8.  Right breast deep margin, excision:   Ductal carcinoma in situ, single involved duct   New deep margin: <0.1 cm   INVASIVE CARCINOMA OF THE BREAST   SPECIMEN   Procedure: Excision (less than total mastectomy)   Specimen Laterality: Right   TUMOR   Tumor Site: Invasive Carcinoma: Not specified   Histologic Type: Invasive carcinoma of no special type (ductal, not   otherwise specified)   Glandular (Acinar) / Tubular Differentiation: Score 3   Nuclear Pleomorphism: Score 2   Mitotic Rate: Score 3 (>=8 mitoses per mm2)   Number of Mitoses per 10 High-Power Fields: 30 mitotic figures   Diameter of Microscope Field in Millimeters: 0.54 Millimeters   (mm)   Overall Grade: Grade 3 (scores of 8 or 9)   Tumor Size: 16 x 15 x 14 Millimeters (mm)   Tumor Focality: Single focus of invasive carcinoma   Ductal Carcinoma In Situ (DCIS): Present   Ductal Carcinoma In Situ (DCIS): Negative for extensive   intraductal component (EIC)   Number of Blocks with DCIS: 4   Number of Blocks Examined: 32   Architectural Patterns: Cribriform   Nuclear Grade: Grade III (high)   Necrosis: Not identified   Lobular Carcinoma In Situ (LCIS): No LCIS in specimen   Tumor Extent   Skin: Skin is not present   Accessory Findings   Lymphovascular Invasion: Not identified   Microcalcifications: Present in non-neoplastic tissue   Treatment Effect: No known presurgical therapy   MARGINS- Includes all specimens   Invasive Carcinoma Margins: Uninvolved by invasive carcinoma   Distance from Closest Margin in Millimeters: 1 Millimeters (mm)   Closest Margin: Inferior   Distance from Anterior Margin in Millimeters: 5 Millimeters (mm)   DCIS Margins: Uninvolved by DCIS   Distance of DCIS from Closest Margin in Millimeters: Distance is   < 1 Millimeter   Closest Margin: Posterior (specimen #8)   Distance of DCIS to Inferior Margin in Millimeters: 6   Millimeters (mm)   LYMPH NODES   Regional Lymph Nodes: Uninvolved by tumor cells   Number of Lymph Nodes Examined: 3   Number of [place] Nodes Examined: 3   PATHOLOGIC STAGE CLASSIFICATION (pTNM, AJCC 8th Edition)   Primary Tumor (Invasive Carcinoma) (pT): pT1c   Regional Lymph Nodes (pN)   Modifier: (sn): Only sentinel node(s) evaluated.    Category (pN): pN0   ADDITIONAL FINDINGS   Additional Pathologic Findings: Fibrocystic changes   HORMONE RECEPTOR and HER-2 status from  :   Estrogen receptor: Positive   Progesterone receptor: Negative   HER-2 IHC: Equivocal   HER2 FISH: Negative *Electronically Signed Out By Bay Schroeder M.D.*      Review of Systems   All other systems reviewed and are negative. Physical Exam   Pulmonary/Chest:       Incisions healing well   Nursing note and vitals reviewed. ASSESSMENT and PLAN    ICD-10-CM ICD-9-CM    1. Malignant neoplasm of lower-outer quadrant of right female breast, unspecified estrogen receptor status (HCC) C50.511 174.5    2.  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

## 2019-08-20 NOTE — TELEPHONE ENCOUNTER
Daily Note     Today's date: 7/15/2019  Patient name: Irene Chun  : 1955  MRN: [id number]  Referring provider: Irma Blankenship MD  Dx:   Encounter Diagnosis     ICD-10-CM    1  Primary osteoarthritis of left knee M17 12                   Subjective: Patient reports he is doing well, but is a little stiff today because he was doing a lot of work around his house this weekend  Feels like he may have over done it  Objective: See treatment diary below      Assessment: Patient's knee flexion is WNL, knee ext is still lacking  Patient was educated to continue to work on knee extension at home and communicated understanding  He was less steady during dynamic balance as he has been in past treatments, which could be due to feeling like he did too much this weekend  Will continue to work on strengthening for functional activities and work on dynamic balance  Tolerated treatment well  Patient would benefit from continued PT To have an optimal return to function  Plan: Continue per plan of care        Precautions: none      Manual  7/5 7/8 7/11 7/15         STM ant distal thigh AF AF KD          Post knee mob to promote extension AF AF KD                                                     Exercise Diary    7/11 7/15         Bike  10' 10' 10'         Hamstring stretch  30 sec  X3 30 sec x3 30 sec x3         Prone quad stretch  30 sec  X3 30sec x3 30 sec x3         Prone quad set  5 sec  X20           Prone extension hang  5 min           Stepping on foam/dynadisc   30           Sit to stand  10#  3X10           Mini squats w/ TB at knees   3x12 GR 3x12 GR LLE back         Step up onto dynadisc to step and down to dynadisc    2 risers 30x 2 dynadisc 10x         Lateral Step Down             Standing TKE     18# 30x         Lunges onto Dynadisc    20x                                                                                                                     Modalities Please send rx today

## 2019-08-21 RX ORDER — MEMANTINE HYDROCHLORIDE 10 MG/1
TABLET ORAL
Qty: 60 TAB | Refills: 5 | Status: SHIPPED | OUTPATIENT
Start: 2019-08-21 | End: 2019-10-21 | Stop reason: SDUPTHER

## 2019-08-21 NOTE — TELEPHONE ENCOUNTER
Received refill request for memantine   Last filled 2/7/19   Follow up appt.  with Dr. Tanya Lorenzana on 10/21/19  Dr. Tanya Lorenzana please refill namenda

## 2019-10-02 ENCOUNTER — TELEPHONE (OUTPATIENT)
Dept: SURGERY | Age: 64
End: 2019-10-02

## 2019-10-02 NOTE — TELEPHONE ENCOUNTER
Patient's  Junior Mitchell called to find out what appointment they were supposed to have at [facility] and what it is about. I returned his call and stated it was with [facility] to follow-up with with results of his wife's genetic testing. His wife has dementia and he stated they had to cancel the appointment due to her other appointments and that Deborah Guillen is done with appointments for awhile\". I encouraged him to reschedule and that it was not for treatment but rather to discuss the results and how it could affect their daughter, who is planning on having genetic testing done in [place]. He said they would think about it and reschedule after talking with his wife. He was appreciative of the call back and states he does have the number at [facility] when they are ready.

## 2019-10-21 ENCOUNTER — OFFICE VISIT (OUTPATIENT)
Dept: NEUROLOGY | Age: 64
End: 2019-10-21

## 2019-10-21 VITALS
BODY MASS INDEX: 22.66 KG/M2 | SYSTOLIC BLOOD PRESSURE: 110 MMHG | OXYGEN SATURATION: 98 % | DIASTOLIC BLOOD PRESSURE: 62 MMHG | HEIGHT: 66 IN | HEART RATE: 65 BPM | WEIGHT: 141 LBS

## 2019-10-21 DIAGNOSIS — G30.9 ALZHEIMER'S DEMENTIA WITHOUT BEHAVIORAL DISTURBANCE, UNSPECIFIED TIMING OF DEMENTIA ONSET: Primary | ICD-10-CM

## 2019-10-21 DIAGNOSIS — F02.80 ALZHEIMER'S DEMENTIA WITHOUT BEHAVIORAL DISTURBANCE, UNSPECIFIED TIMING OF DEMENTIA ONSET: Primary | ICD-10-CM

## 2019-10-21 RX ORDER — MEMANTINE HYDROCHLORIDE 10 MG/1
TABLET ORAL
Qty: 60 TAB | Refills: 11 | Status: SHIPPED | OUTPATIENT
Start: 2019-10-21 | End: 2020-01-01

## 2019-10-21 RX ORDER — DONEPEZIL HYDROCHLORIDE 10 MG/1
10 TABLET, FILM COATED ORAL
Qty: 30 TAB | Refills: 11 | Status: SHIPPED | OUTPATIENT
Start: 2019-10-21 | End: 2020-01-01

## 2019-10-21 NOTE — PATIENT INSTRUCTIONS

## 2019-10-21 NOTE — PROGRESS NOTES
Neurology follow-up note    Chief Complaint   Patient presents with    Follow-up     memory       SUBJECTIVE  Bony Hess is a 66 y.o. female who presented to the neurology office for management of dementia. She was diagnosed with dementia around 2 years ago. t. She is taking memantine 10 mg po bid and aricept 10 mg daily. No side effect. There was been mild worsening since the last visit. She does not know where things are in the kitchen. She does not drive. Her  has to take out clothes from the closet. He fixes meals. No agitation or hallucinations. She did have breast cancer. She is s/p resection. No chemotherapy but did receive RT. Current Outpatient Medications   Medication Sig    donepezil (ARICEPT) 10 mg tablet Take 1 Tab by mouth nightly.  memantine (NAMENDA) 10 mg tablet take 1 tablet by mouth twice a day    ondansetron (ZOFRAN ODT) 4 mg disintegrating tablet Take 1 Tab by mouth every eight (8) hours as needed for Nausea.  pravastatin (PRAVACHOL) 10 mg tablet take 1 tablet by mouth at bedtime     No current facility-administered medications for this visit. Past Medical History:   Diagnosis Date    Breast cancer ([facility] [address] 2008    Left    Hyperlipidemia 6/30/2017    Memory disorder     Radiation therapy complication     Lt breast 2008--no chemo     Past Surgical History:   Procedure Laterality Date    HX BREAST LUMPECTOMY Left 2008    HX BREAST LUMPECTOMY Right 6/27/2019    RIGHT BREAST LUMPECTOMY, RIGHT BREAST SENTINEL NODE BIOPSY performed by Lucía Sheriff [facility] at [facility]    [facility] US BX BREAST LT 1ST LESION W/CLIP AND SPECIMEN Left 2008    multiple areas sampled--one area breast CA     Family History   Problem Relation Age of Onset    Lung Disease Brother     Breast Cancer Sister         43     Social History     Tobacco Use    Smoking status: Never Smoker    Smokeless tobacco: Never Used   Substance Use Topics    Alcohol use: No    Drug use:  No REVIEW OF SYSTEMS:   A ten system review of constitutional, cardiovascular, respiratory, musculoskeletal, endocrine, skin, SHEENT, genitourinary, psychiatric and neurologic systems was obtained and is unremarkable except dementia. EXAMINATION:   Visit Vitals  /62   Pulse 65   Ht 5' 6\" (1.676 m)   Wt 141 lb (64 kg)   SpO2 98%   BMI 22.76 kg/m²        General:   General appearance: Pt is in no acute distress   Distal pulses are preserved    Neurological Examination:   Mental Status: AAO x 2. Speech is fluent. Follows commands, has poor fund of knowledge, attention, short term recall, comprehension and insight. Cranial Nerves: Visual fields are full. PERRL, Extraocular movements are full. Facial sensation intact. Facial movement intact. Hearing intact to conversation. Palate elevates symmetrically. Shoulder shrug symmetric. Tongue midline. Motor: Strength is 5/5 in all 4 ext. Sensation: Light touch - Normal    Coordination/Cerebellar: Intact to finger-nose-finger     Skin: No significant bruising or lacerations. Laboratory review:   Results for orders placed or performed in visit on 00/93/85   METABOLIC PANEL, COMPREHENSIVE   Result Value Ref Range    Glucose 92 65 - 99 mg/dL    BUN 15 8 - 27 mg/dL    Creatinine 0.81 0.57 - 1.00 mg/dL    GFR est non-AA 79 >59 mL/min/1.73    GFR est AA 91 >59 mL/min/1.73    BUN/Creatinine ratio 19 12 - 28    Sodium 144 134 - 144 mmol/L    Potassium 4.3 3.5 - 5.2 mmol/L    Chloride 103 96 - 106 mmol/L    CO2 26 20 - 29 mmol/L    Calcium 9.6 8.7 - 10.3 mg/dL    Protein, total 7.2 6.0 - 8.5 g/dL    Albumin 4.8 3.6 - 4.8 g/dL    GLOBULIN, TOTAL 2.4 1.5 - 4.5 g/dL    A-G Ratio 2.0 1.2 - 2.2    Bilirubin, total 0.5 0.0 - 1.2 mg/dL    Alk.  phosphatase 44 39 - 117 IU/L    AST (SGOT) 18 0 - 40 IU/L    ALT (SGPT) 15 0 - 32 IU/L   LIPID PANEL   Result Value Ref Range    Cholesterol, total 194 100 - 199 mg/dL    Triglyceride 69 0 - 149 mg/dL    HDL Cholesterol 74 >39 mg/dL    VLDL, calculated 14 5 - 40 mg/dL    LDL, calculated 106 (H) 0 - 99 mg/dL   CBC WITH AUTOMATED DIFF   Result Value Ref Range    WBC 4.4 3.4 - 10.8 x10E3/uL    RBC 4.37 3.77 - 5.28 x10E6/uL    HGB 12.1 11.1 - 15.9 g/dL    HCT 36.0 34.0 - 46.6 %    MCV 82 79 - 97 fL    MCH 27.7 26.6 - 33.0 pg    MCHC 33.6 31.5 - 35.7 g/dL    RDW 13.6 12.3 - 15.4 %    PLATELET 377 710 - 276 x10E3/uL    NEUTROPHILS 51 Not Estab. %    Lymphocytes 41 Not Estab. %    MONOCYTES 5 Not Estab. %    EOSINOPHILS 2 Not Estab. %    BASOPHILS 1 Not Estab. %    ABS. NEUTROPHILS 2.2 1.4 - 7.0 x10E3/uL    Abs Lymphocytes 1.8 0.7 - 3.1 x10E3/uL    ABS. MONOCYTES 0.2 0.1 - 0.9 x10E3/uL    ABS. EOSINOPHILS 0.1 0.0 - 0.4 x10E3/uL    ABS. BASOPHILS 0.0 0.0 - 0.2 x10E3/uL    IMMATURE GRANULOCYTES 0 Not Estab. %    ABS. IMM. GRANS. 0.0 0.0 - 0.1 x10E3/uL   TSH 3RD GENERATION   Result Value Ref Range    TSH 3.620 0.450 - 4.500 uIU/mL   CVD REPORT   Result Value Ref Range    INTERPRETATION Note        Imaging review:  10/12/2018  MRI brain with and without contrast  No acute finding. Increasing size of left acoustic schwannoma. No other changes otherwise. Documentation review:  I requested records from [facility] providers in preparation for my face-to-face visit with her today regarding her presenting complaint. I spent 35 minutes performing a non-face-to-face review of these records and they are summarized below. I reviewed Dr. Emilie Aguero note from 5/24/2019. Patient does have Alzheimer's type dementia which is far from end-stage. She is pleasant and knows where she is but has very poor recent memory. She is very intolerant of donepezil and lost weight on it and was nauseated all the time. On assessment and plan patient does have a early fairly rapid progressive Alzheimer's dementia. Patient is taking memantine 5 mg twice a day and she is intolerant of donepezil.   Will increase memantine to 10 mg in the morning and 5 in the evening for 4 weeks and then 10 mg twice a week. I am not going to attempt to try donepezil back. 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 Assessment/Plan:   Alex Alegria is a 66 y.o. female who presented to the neurology office for management of dementia. Patient is taking Aricept 10 mg daily and memantine 10 mg p.o. twice daily. Refilled medications for a year. We will see the patient back in a year. 3 most recent PHQ Screens 10/25/2018   Little interest or pleasure in doing things Not at all   Feeling down, depressed, irritable, or hopeless Not at all   Total Score PHQ 2 0     Primary care to address possible depression if PHQ-9 score is more than 9. ICD-10-CM ICD-9-CM    1. Alzheimer's dementia without behavioral disturbance, unspecified timing of dementia onset ([facility] G30.9 331.0 donepezil (ARICEPT) 10 mg tablet    F02.80 294.10 memantine (NAMENDA) 10 mg tablet        Tatyana Simon MD  Neurologist    CC: Parish Arredondo NP  Fax: [phone]    This note was created using voice recognition software. Despite editing, there may be syntax errors.

## 2019-11-08 ENCOUNTER — OFFICE VISIT (OUTPATIENT)
Dept: FAMILY MEDICINE CLINIC | Age: 64
End: 2019-11-08

## 2019-11-08 VITALS
DIASTOLIC BLOOD PRESSURE: 69 MMHG | HEART RATE: 59 BPM | RESPIRATION RATE: 16 BRPM | TEMPERATURE: 98.3 F | HEIGHT: 66 IN | WEIGHT: 141 LBS | BODY MASS INDEX: 22.66 KG/M2 | SYSTOLIC BLOOD PRESSURE: 110 MMHG | OXYGEN SATURATION: 98 %

## 2019-11-08 DIAGNOSIS — F02.80 EARLY ONSET ALZHEIMER'S DEMENTIA WITHOUT BEHAVIORAL DISTURBANCE (HCC): Primary | ICD-10-CM

## 2019-11-08 DIAGNOSIS — G30.0 EARLY ONSET ALZHEIMER'S DEMENTIA WITHOUT BEHAVIORAL DISTURBANCE (HCC): Primary | ICD-10-CM

## 2019-11-08 DIAGNOSIS — C50.411 MALIGNANT NEOPLASM OF UPPER-OUTER QUADRANT OF RIGHT BREAST IN FEMALE, ESTROGEN RECEPTOR NEGATIVE (HCC): ICD-10-CM

## 2019-11-08 DIAGNOSIS — E78.5 HYPERLIPIDEMIA, UNSPECIFIED HYPERLIPIDEMIA TYPE: ICD-10-CM

## 2019-11-08 DIAGNOSIS — Z13.29 SCREENING FOR THYROID DISORDER: ICD-10-CM

## 2019-11-08 DIAGNOSIS — R26.81 UNSTEADY GAIT: ICD-10-CM

## 2019-11-08 DIAGNOSIS — R82.998 LEUKOCYTES IN URINE: ICD-10-CM

## 2019-11-08 DIAGNOSIS — Z17.1 MALIGNANT NEOPLASM OF UPPER-OUTER QUADRANT OF RIGHT BREAST IN FEMALE, ESTROGEN RECEPTOR NEGATIVE (HCC): ICD-10-CM

## 2019-11-08 DIAGNOSIS — E55.9 VITAMIN D DEFICIENCY: ICD-10-CM

## 2019-11-08 LAB
BILIRUB UR QL STRIP: NEGATIVE
GLUCOSE UR-MCNC: NEGATIVE MG/DL
KETONES P FAST UR STRIP-MCNC: NEGATIVE MG/DL
PH UR STRIP: 5.5 [PH] (ref 4.6–8)
PROT UR QL STRIP: NEGATIVE
SP GR UR STRIP: 1.01 (ref 1–1.03)
UA UROBILINOGEN AMB POC: NORMAL (ref 0.2–1)
URINALYSIS CLARITY POC: CLEAR
URINALYSIS COLOR POC: YELLOW
URINE BLOOD POC: NORMAL
URINE LEUKOCYTES POC: NORMAL
URINE NITRITES POC: NEGATIVE

## 2019-11-08 RX ORDER — MULTIVITAMIN WITH IRON
1 TABLET ORAL DAILY
COMMUNITY

## 2019-11-08 RX ORDER — LETROZOLE 2.5 MG/1
2.5 TABLET, FILM COATED ORAL
COMMUNITY
Start: 2019-10-24 | End: 2019-12-23

## 2019-11-08 NOTE — PROGRESS NOTES
Chief Complaint   Patient presents with    Well Woman     no pap     1. Have you been to the ER, urgent care clinic since your last visit? Hospitalized since your last visit? Yes When: Pt had breast cancer on right side and had surgery since last visit. 2. Have you seen or consulted any other health care providers outside of the [address] [place] [address] since your last visit? Include any pap smears or colon screening. No      Health maintenance reviewed. Pt informed of health maintenance past due and/or upcoming. Pt verbalized understanding. Health Maintenance Due   Topic Date Due    Shingrix Vaccine Age 49> (1 of 2) 12/03/2007    Influenza Age 5 to Adult  08/01/2019     Pt declined flu vaccine this visit. Pt is fasting this visit.

## 2019-11-08 NOTE — PATIENT INSTRUCTIONS
Alzheimer's Disease: Care Instructions  Your Care Instructions    Alzheimer's disease is a type of dementia. It causes memory loss and affects judgment, language, and behavior. You may have trouble making decisions or may get lost in places that you used to know well. Alzheimer's disease is different than mild memory loss that occurs with aging. It is not clear what causes Alzheimer's disease, but it is the most common form of dementia in older adults. Finding out that you have this disease is a shock. You may be afraid and worried about how the condition will change your life. Although there is no cure at this time, medicine in some cases may slow memory loss for a while. Other medicines may be able to help you sleep or cope with depression and behavior changes. Alzheimer's disease is different for everyone. It may take many years to develop. In some cases, people can function well for a long time. In the early stage of the disease, you can do things at home to make life easier and safer. You also can keep doing your hobbies and other activities. Many people find comfort in planning now for their future needs. Follow-up care is a key part of your treatment and safety. Be sure to make and go to all appointments, and call your doctor if you are having problems. It's also a good idea to know your test results and keep a list of the medicines you take. How can you care for yourself at home? Taking care of yourself  · If your doctor gives you medicines, take them exactly as prescribed. Call your doctor if you think you are having a problem with your medicine. You will get more details on the medicines your doctor prescribes. · Eat a balanced diet. Get plenty of whole grains, fruits, and vegetables every day. If you are not hungry at mealtimes, eat snacks at midmorning and in the afternoon. Try drinks such as Boost, Ensure, or Sustacal if you are having trouble keeping your weight up. · Stay active.  Exercise such as walking may slow the decline of your mental abilities. Try to stay active mentally too. Read and work crossword puzzles if you enjoy these activities. · If you have trouble sleeping, do not nap during the day. Get regular exercise (but not within several hours of bedtime). Drink a glass of warm milk or caffeine-free herbal tea before going to bed. · Ask your doctor about support groups and other resources in your area. They can help people who have Alzheimer's disease and their families. · Be patient. You may find that a task takes you longer than it used to. · If you have not already done so, make a list of advance directives. Advance directives are instructions to your doctor and family members about what kind of care you want if you become unable to speak or express yourself. Talk to a  about making a will, if you do not already have one. Keeping schedules  · Develop a routine. You will feel less frustrated or confused if you have a clear, simple plan of what to do every day. ? Make lists of your medicines and when to take them. ? Write down appointments and other tasks in a calendar. ? Put sticky notes around the house to help you remember events and other things you have to do. ? Schedule activities and tasks for times of the day when you are best able to handle them. Staying safe  · Tell someone when you are going out and where you are going. Let the person know when you will be back. Before you go out alone, write down where you are going, how to get there, and how to get back home. Do this even if you have gone there many times before. Take someone along with you when possible. · Make your home safe. Tack down rugs, put no-slip tape in the tub, use handrails, and put safety switches on stoves and appliances. · Have a family member or other caregiver tell you whether you are driving badly. Deciding to stop driving is very hard for many people. Driving helps you feel independent.  Your state 's license bureau can do a driving test if there is any question. Plan for other means of getting around when you are no longer able to drive. · Use strong lighting, especially at night. Put night-lights in bedrooms, hallways, and bathrooms. · Lower the hot water temperature setting to 120°F or lower to avoid burns. When should you call for help? Call 911 anytime you think you may need emergency care. For example, call if:    · You are lost and do not know whom to call.     · You are injured and do not know whom to call.    Call your doctor now or seek immediate medical care if:    · Your symptoms suddenly get much worse.    Watch closely for changes in your health, and be sure to contact your doctor if:    · You want more information about how you can take care of yourself. Where can you learn more? Go to http://shelbyOkCupidshanika.info/. Enter Y179 in the search box to learn more about \"Alzheimer's Disease: Care Instructions. \"  Current as of: May 28, 2019  Content Version: 12.2  © 1548-3663 [employer]. Care instructions adapted under license by [employer] (which disclaims liability or warranty for this information). If you have questions about a medical condition or this instruction, always ask your healthcare professional. [address] any warranty or liability for your use of this information. Learning About Dementia  What is dementia? We all forget things as we get older. Many older people have a slight loss of memory that does not affect their daily lives. But memory loss that gets worse may mean that you have dementia. Dementia is a loss of mental skills that affects your daily life. It can cause problems with memory, problem-solving, and learning. It also can cause problems with thinking and planning. Dementia usually gets worse over time. But how quickly it gets worse is different for each person.  Some people stay the same for years. Others lose skills quickly. Your chances of having dementia rise as you get older. But this doesn't mean that everyone will get it. How is dementia diagnosed? To diagnose dementia, your doctor will:  · Do a physical exam.  · Ask questions about recent and past illnesses and life events. The doctor will want to talk to a close family member to check details. · Ask you to do some simple things that test your memory and other mental skills. Your doctor may ask you to tell what day and year it is, repeat a series of words, or draw a clock face. The doctor may do tests to look for a cause that can be treated. For example, you might have blood tests to check your thyroid or to look for an infection. You might also have a test that shows a picture of your brain, like an MRI or a CT scan. These tests can help your doctor find a tumor or brain injury. Knowing the type of dementia a person has can help the doctor prescribe medicines or other treatments. What are the symptoms? Usually the first symptom of dementia is memory loss. Often the person who has the memory problem doesn't notice it, but family and friends do. People who have dementia may have increasing trouble with:  · Recalling recent events. They may forget appointments or lose objects. · Recognizing people and places. · Keeping up with conversations and activity. · Finding their way around familiar places, or driving to and from places they know well. · Keeping up personal care such as grooming or bathing. · Planning and carrying out routine tasks. They may have trouble following a recipe or writing a letter or email. How is dementia treated? Medicines for dementia can slow it down for a while and make it easier to live with. Medicines can't cure it. But they may help improve mental function, mood, or behavior. If a stroke caused the dementia, doing things to reduce the chance of another stroke may help.  They include eating healthy foods, being active, staying at a healthy weight, and not smoking. As dementia gets worse, a person may get depressed or angry and upset. An active social life, counseling, and sometimes medicine may help with changing emotions. The goals of ongoing treatment are to keep the person safely at home as long as possible and to provide support and guidance to the caregivers. The person will need routine follow-up visits. The doctor will monitor medicines and the person's level of functioning. Follow-up care is a key part of your treatment and safety. Be sure to make and go to all appointments, and call your doctor if you are having problems. It's also a good idea to know your test results and keep a list of the medicines you take. Where can you learn more? Go to http://shelby-shanika.info/. Enter [id number] [id number] [id number] in the search box to learn more about \"Learning About Dementia. \"  Current as of: May 28, 2019  Content Version: 12.2  © 6585-3496 [employer]. Care instructions adapted under license by [employer] (which disclaims liability or warranty for this information). If you have questions about a medical condition or this instruction, always ask your healthcare professional. [address] any warranty or liability for your use of this information.

## 2019-11-08 NOTE — PROGRESS NOTES
Chief Complaint   Patient presents with    Well Woman     no pap         S: Enrique Durán is a 66 y.o. female  who presents for wellness exam (no pap), here with  who provides history. Last pap-UTD  Last Mammogram- UTD  Colonoscopy- UTD        History of Malignant neoplasm of upper-outer quadrant of right breast in female, estrogen receptor positive  Followed by RADIATION ONCOLOGIST. : Diego Combs MD  And heme/onc Dr Linda Bingham (She is s/p resection. No chemotherapy but did receive RT). Note from last visit on 10/24/19:  Staging form: Breast, AJCC 8th Edition  - Pathologic: pT1c, pN0, cM0, ER+, RI: Unknown, HER2- - Signed by Jess Cole MD on 7/12/2019  Current Treatment:  Femara 2.5 mg daily   Mrs. Aicha Walker returns for a follow-up visit today for breast cancer. She recently stopped Arimidex due to a number of side effects including fatigue, nausea, hot flashes. She is back to her normal condition, she reports good appetite, no nausea, no shortness of breath, no hot flashes. She has a follow-up visit with her surgeon tomorrow. She denies headache, back pain, fevers, recent infection. She denies nausea or abdominal pain. She is here with a family member today. Mrs. Aicha Walker is doing well, recent side effects from Arimidex have resolved. I talked with her and her sister today about trying for marrow which is equivalent in efficacy to Arimidex, hopefully she will not have similar side effects, but I did mention that hot flashes are common side effect with both medications. She agrees to contact us right away if she has any worrisome or bothersome side effects. Prescription was sent to her pharmacy today. She will see her breast surgeon for a follow-up visit tomorrow. Recent metabolic panel and blood counts were normal. We will plan to monitor yearly bone density scan, and yearly mammogram.    Next appointment with oncologist is in January 2020. She has dementia alzheimer's as well. \"good days and bad days\" per .  notes that sometimes she is uncoordinated and off balance. She fell this morning but no injury, \"I looked over and she was laying in the yard, I think she lost her balance\". This is her first noted fall per . She walks slow and  says that her balance is \"a little off since she started these medicines for her dementia\". She is home alone much of the time during the day but never has tried to leave the house and has never appeared to be harm risk to self per .  makes her breakfast everyday and she never eats lunch and then he makes her dinner. She eats well, but he says that she never has wanted lunch. Drinks plenty of water. She was seen by Dr Tayo Mistry, neurology 10/21/19:  Patient is taking Aricept 10 mg daily and memantine 10 mg p.o. twice daily. Refilled medications for a year. We will see the patient back in a year. Imaging review:  10/12/2018  MRI brain with and without contrast  No acute finding. Increasing size of left acoustic schwannoma. No other changes otherwise.           No LMP recorded. Patient is postmenopausal..      Denies any systemic symptoms including fever, myalgias, chills, weakness, weight loss and fatigue. Denies respiratory symptoms including cough, SOB, or wheezing. Denies any cardiac symptoms including chest pain, tightness or discomfort or syncope. ROS is otherwise negative. Nutrition:   Physical excercise:  Social:  Denies any recent stressors.    Tobacco: Denies smoking or use of other tobacco products  Alcohol: Denies alcohol use    Past Medical History:   Diagnosis Date    Breast cancer ([facility] [address] 2008    Left    Hyperlipidemia 6/30/2017    Memory disorder     Radiation therapy complication     Lt breast 2008--no chemo     Past Surgical History:   Procedure Laterality Date    HX BREAST LUMPECTOMY Left 2008    HX BREAST LUMPECTOMY Right 6/27/2019    RIGHT BREAST LUMPECTOMY, RIGHT BREAST SENTINEL NODE BIOPSY performed by Tejas Wahl MD at [facility]UL[facility] OR    [facility] US BX BREAST LT 1ST LESION W/CLIP AND SPECIMEN Left 2008    multiple areas sampled--one area breast CA     Allergies   Allergen Reactions    Pcn [Penicillins] Hives     Current Outpatient Medications   Medication Sig Dispense Refill    letrozole (FEMARA) 2.5 mg tablet Take 2.5 mg by mouth.  pumpkin seed extract/soy germ (AZO BLADDER CONTROL PO) Take  by mouth.  multivitamin with iron tablet Take 1 Tab by mouth daily.  omega 3-dha-epa-fish oil (FISH OIL) 100-160-1,000 mg cap Take  by mouth.  donepezil (ARICEPT) 10 mg tablet Take 1 Tab by mouth nightly. 30 Tab 11    memantine (NAMENDA) 10 mg tablet take 1 tablet by mouth twice a day 60 Tab 11    pravastatin (PRAVACHOL) 10 mg tablet take 1 tablet by mouth at bedtime 30 Tab 6    ondansetron (ZOFRAN ODT) 4 mg disintegrating tablet Take 1 Tab by mouth every eight (8) hours as needed for Nausea. 5 Tab 1         Agree with nurses note. O: VS:   Visit Vitals  /69 (BP 1 Location: Left arm, BP Patient Position: Sitting)   Pulse (!) 59   Temp 98.3 °F (36.8 °C) (Oral)   Resp 16   Ht 5' 6\" (1.676 m)   Wt 141 lb (64 kg)   SpO2 98%   BMI 22.76 kg/m²     PAIN: No complaints of pain today. GENERAL: Steve Arrington is sitting on the table in no acute distress. Non-toxic. Well nourished. Well developed. Appropriately groomed. She is friendly, social and cooperative. HEAD:  Normocephalic. Atraumatic. Non tender sinuses x 4. EYE: PERRLA. EOMs intact. Sclera anicteric without injection. No drainage or discharge. EARS: Hearing intact bilaterally. External ear canals normal without evidence of blood or swelling. Bilateral TM's intact, pearly grey with landmarks visible. No erythema or effusion. NOSE: Patent. Nasal turbinates pink. No polyps noted. No erythema. No discharge.     MOUTH: mucous membranes pink and moist. Posterior pharynx normal with cobblestone appearance. No erythema, white exudate or obstruction. NECK: supple. Midline trachea. No thyromegaly noted. RESP: Breath sounds are symmetrical bilaterally. Unlabored without SOB. Speaking in full sentences. Clear to auscultation bilaterally anteriorly and posteriorly. No wheezes. No rales or rhonchi. CV: normal rate. Regular rhythm. S1, S2 audible. No murmur noted. No rubs, clicks or gallops noted. ABDOMEN: Flat without bulges or pulsations. Soft and nondistended. No tenderness on palpation. No masses or organomegaly. No rebound, rigidity or guarding. Bowel sounds normal x 4 quadrants. BACK: No visible deformities or curvature. Full ROM. No pain on palpation of the spinous processes in the cervical, thoracic, lumbar, sacral regions. No CVA tenderness. NEURO:  awake, alert and oriented to person but not time or place. Clear speech. Muscle strength is +5/5 x 4 extremities. Steady but slow gait. MUSC:  Intact x 4 extremities. Full ROM x 4 extremities. No pain with movement. HEME/LYMPH: peripheral pulses palpable 2+ x 4 extremities. No peripheral edema is noted. No cervical adenopathy noted. SKIN: clean dry and intact throughout. No rashes, erythema, ecchymosis, lacerations, abrasions, suspicious moles noted. PSYCH: appropriate behavior, dress. Pleasantly confused, cannot answer questions regarding her health. Good eye contact. Clear and coherent speech. Full affect.     Results for orders placed or performed in visit on 11/08/19   AMB POC URINALYSIS DIP STICK AUTO W/O MICRO   Result Value Ref Range    Color (UA POC) Yellow     Clarity (UA POC) Clear     Glucose (UA POC) Negative Negative    Bilirubin (UA POC) Negative Negative    Ketones (UA POC) Negative Negative    Specific gravity (UA POC) 1.015 1.001 - 1.035    Blood (UA POC) Trace Negative    pH (UA POC) 5.5 4.6 - 8.0    Protein (UA POC) Negative Negative    Urobilinogen (UA POC) 0.2 mg/dL 0.2 - 1    Nitrites (UA POC) Negative Negative    Leukocyte esterase (UA POC) 2+ Negative         Assessment: Wellness examination    Plan:  Differential diagnosis and treatment options reviewed with  who is in agreement with treatment plan as outlined below. ICD-10-CM ICD-9-CM    1. Early onset Alzheimer's dementia without behavioral disturbance (HCC) V16.9 017.8 METABOLIC PANEL, COMPREHENSIVE    F02.80 294.10 CBC WITH AUTOMATED DIFF   2. Hyperlipidemia, unspecified hyperlipidemia type E78.5 272.4 LIPID PANEL      METABOLIC PANEL, COMPREHENSIVE   3. Screening for thyroid disorder Z13.29 V77.0 TSH 3RD GENERATION   4. Vitamin D deficiency E55.9 268.9 VITAMIN D, 25 HYDROXY   5. Malignant neoplasm of upper-outer quadrant of right breast in female, estrogen receptor negative (HCC) C50.411 174.4 CBC WITH AUTOMATED DIFF    Z17.1 V86.1    6. Unsteady gait R26.81 781.2 AMB POC URINALYSIS DIP STICK AUTO W/O MICRO   7. Leukocytes in urine R82.998 791.7 CULTURE, URINE       Will send urine for culture. Labs today. Will monitor at home for signs of weakness. If increased signs of weakness,  will call. May need to consider PT.     To ER for worsening of symptoms   Discussed health maintenance screening guidelines  Advised on nutrition, physical activity, weight management, tobacco, alcohol and safety  Reviewed and given written instruction, see below    [address] [place] [address] [address] (Included in AVS):  · Attain and/or maintain a healthy weight (BMI between 18 and 30)  · Attain and/or maintain regular physical activity for 30 minutes 5 days/week   · Eat at least 5 servings of fruits and vegetables daily, preferably fresh  · Limit fast food and prepared foods  · Attain and/or maintain healthy blood pressure   · Attain and/or maintain no nicotine/tobacco use status  · Annual flu shot (or nasal mist as appropriate)  · Stay up-to-date with immunizations including tetanus, pertussis, HPV, & pneumonia  · Annual eye exam · Biannual dental visits  · Annual skin cancer screening  · Annual gynecologic visit for women over age 24  · Annual prostate exam for black men starting at age 36, and for other races starting at age 48 (unless indicated earlier by history)  · Colonscopy every 8 years after age 48 (unless indicated more frequently by history)  · Consider daily 81mg aspirin for men over age 39 and women over age 54  · Annual screening labs: thyroid tests (TSH and T4), complete blood count, lipid panel (cholesterol), hemoglobin A1c (diabetes screening), comprehensive metabolic panel (includes kidney function, liver function, and electrolytes), vitamin D level, urinalysis (with urine culture and microalbumin as medically indicated)  · Consider annual testing for sexually transmitted infections including HIV  · Screening bone density testing in all women over age 72    Verbal and written instructions (see AVS) provided.  expresses understanding and agreement of diagnosis and treatment plan.

## 2019-11-09 LAB
25(OH)D3+25(OH)D2 SERPL-MCNC: 27.7 NG/ML (ref 30–100)
ALBUMIN SERPL-MCNC: 5 G/DL (ref 3.6–4.8)
ALBUMIN/GLOB SERPL: 1.7 {RATIO} (ref 1.2–2.2)
ALP SERPL-CCNC: 49 IU/L (ref 39–117)
ALT SERPL-CCNC: 21 IU/L (ref 0–32)
AST SERPL-CCNC: 21 IU/L (ref 0–40)
BASOPHILS # BLD AUTO: 0 X10E3/UL (ref 0–0.2)
BASOPHILS NFR BLD AUTO: 1 %
BILIRUB SERPL-MCNC: 0.4 MG/DL (ref 0–1.2)
BUN SERPL-MCNC: 16 MG/DL (ref 8–27)
BUN/CREAT SERPL: 21 (ref 12–28)
CALCIUM SERPL-MCNC: 10.1 MG/DL (ref 8.7–10.3)
CHLORIDE SERPL-SCNC: 102 MMOL/L (ref 96–106)
CHOLEST SERPL-MCNC: 232 MG/DL (ref 100–199)
CO2 SERPL-SCNC: 27 MMOL/L (ref 20–29)
CREAT SERPL-MCNC: 0.75 MG/DL (ref 0.57–1)
EOSINOPHIL # BLD AUTO: 0.1 X10E3/UL (ref 0–0.4)
EOSINOPHIL NFR BLD AUTO: 2 %
ERYTHROCYTE [DISTWIDTH] IN BLOOD BY AUTOMATED COUNT: 11.9 % (ref 12.3–15.4)
GLOBULIN SER CALC-MCNC: 2.9 G/DL (ref 1.5–4.5)
GLUCOSE SERPL-MCNC: 78 MG/DL (ref 65–99)
HCT VFR BLD AUTO: 38.2 % (ref 34–46.6)
HDLC SERPL-MCNC: 74 MG/DL
HGB BLD-MCNC: 12.8 G/DL (ref 11.1–15.9)
IMM GRANULOCYTES # BLD AUTO: 0 X10E3/UL (ref 0–0.1)
IMM GRANULOCYTES NFR BLD AUTO: 0 %
INTERPRETATION, 910389: NORMAL
LDLC SERPL CALC-MCNC: 141 MG/DL (ref 0–99)
LYMPHOCYTES # BLD AUTO: 1 X10E3/UL (ref 0.7–3.1)
LYMPHOCYTES NFR BLD AUTO: 26 %
MCH RBC QN AUTO: 27.7 PG (ref 26.6–33)
MCHC RBC AUTO-ENTMCNC: 33.5 G/DL (ref 31.5–35.7)
MCV RBC AUTO: 83 FL (ref 79–97)
MONOCYTES # BLD AUTO: 0.3 X10E3/UL (ref 0.1–0.9)
MONOCYTES NFR BLD AUTO: 7 %
NEUTROPHILS # BLD AUTO: 2.6 X10E3/UL (ref 1.4–7)
NEUTROPHILS NFR BLD AUTO: 64 %
PLATELET # BLD AUTO: 309 X10E3/UL (ref 150–450)
POTASSIUM SERPL-SCNC: 4 MMOL/L (ref 3.5–5.2)
PROT SERPL-MCNC: 7.9 G/DL (ref 6–8.5)
RBC # BLD AUTO: 4.62 X10E6/UL (ref 3.77–5.28)
SODIUM SERPL-SCNC: 144 MMOL/L (ref 134–144)
TRIGL SERPL-MCNC: 85 MG/DL (ref 0–149)
TSH SERPL DL<=0.005 MIU/L-ACNC: 5.09 UIU/ML (ref 0.45–4.5)
VLDLC SERPL CALC-MCNC: 17 MG/DL (ref 5–40)
WBC # BLD AUTO: 4 X10E3/UL (ref 3.4–10.8)

## 2019-11-10 LAB — BACTERIA UR CULT: NORMAL

## 2019-11-11 NOTE — PROGRESS NOTES
Please let  know:  Cholesterol is a little elevated. Has she been taking her pravastatin every night? If so, we should increase the dose a little. Vitamin d is a little low, should be taking vitamin d3 2000 units daily (this is over the counter). Her TSH (thyroid lab) was a little elevated. Would like to repeat this lab at her follow up appointment to make sure that it normalized. Urine culture did not really show particular bacteria but was a little dirty. She should work on drinking more water. If she shows any signs of UTI then we need to get her to come back in for another specimen.   Let me know if she (or her ) have any questions  Thanks   Manuel Abdalla [facility]

## 2019-11-11 NOTE — PROGRESS NOTES
Called pt, verified name and . Informed pt that per Shalonda Pineda cholesterol is a little elevated. He states that she has been taking her pravastatin every night and would like the new rx called in. Also informed him to start giving her 2000U vit d daily. Informed him that her TSh was a little elevated and we will recheck at next appt. Also informed that urine was a little dirty, but no sign of bacteria however, if she starts having symptoms of UTI he noted he will bring her in. He stated understanding of all lab results and has no further questions at this time.

## 2019-11-14 ENCOUNTER — OFFICE VISIT (OUTPATIENT)
Dept: SURGERY | Age: 64
End: 2019-11-14

## 2019-11-14 VITALS — WEIGHT: 141 LBS | BODY MASS INDEX: 22.66 KG/M2 | HEIGHT: 66 IN

## 2019-11-14 DIAGNOSIS — Z98.890 S/P LUMPECTOMY, RIGHT BREAST: ICD-10-CM

## 2019-11-14 DIAGNOSIS — Z15.89 BIALLELIC MUTATION OF ATM GENE: ICD-10-CM

## 2019-11-14 DIAGNOSIS — Z92.3 S/P RADIATION THERAPY: ICD-10-CM

## 2019-11-14 DIAGNOSIS — C50.511 MALIGNANT NEOPLASM OF LOWER-OUTER QUADRANT OF RIGHT FEMALE BREAST, UNSPECIFIED ESTROGEN RECEPTOR STATUS (HCC): Primary | ICD-10-CM

## 2019-11-14 NOTE — PROGRESS NOTES
HISTORY OF PRESENT ILLNESS  Mohit Barba is a 66 y.o. female. HPI ESTABLISHED patient here for follow up RIGHT breast cancer. No pain or breast problems to report at this time. She has progressive alzheimer's dementia.      Breast cancer-  REferring - Estel Sicard, NP  She has a history of LEFT breast cancer, 2008, having lumpectomy, chemo and XRT at that time. 4/23/19 - 67 yo female with right breast T1 N0 IDC Er/Pr + Her 2 arvind negative. 6/27/19 - LEFT breast lumpectomy and LEFT SLNB. Surg path T1 N0, Margins clear  8/13/19 - 9/29/19 - radiation therapy - Dr. Aniyah Marte in [facility]  Letrozole - per medical oncologist management at [place] [address] History-  Sister, breast cancer, age 43, survivor. Patient has gene mutation MANJEET. Breast imaging-  4/2019 - mammogram - prior to treatment. OB History    None      Obstetric Comments   Menarche 17, LMP unknown, # of children 1, age of 4st delivery 14, Hysterectomy/oophorectomy yes/yes, Breast bx yes-LEFT 2008, breast cancer, history of breast feeding no, BCP no, Hormone therapy no           Past Surgical History:   Procedure Laterality Date    HX BREAST LUMPECTOMY Left 2008    HX BREAST LUMPECTOMY Right 6/27/2019    RIGHT BREAST LUMPECTOMY, RIGHT BREAST SENTINEL NODE BIOPSY performed by Elizabeth Rolon MD at [facility] OR    [address] W/CLIP AND SPECIMEN Left 2008    multiple areas sampled--one area breast CA     ROS    Physical Exam   Constitutional: She appears well-developed and well-nourished. Pulmonary/Chest: Right breast exhibits no inverted nipple, no mass, no nipple discharge, no skin change (post treatment skin changes) and no tenderness. Left breast exhibits no inverted nipple, no mass, no nipple discharge, no skin change and no tenderness. Breasts are symmetrical.       Musculoskeletal: Normal range of motion. UE x 2   Lymphadenopathy:     She has no axillary adenopathy.         Right: No supraclavicular adenopathy present. Left: No supraclavicular adenopathy present. Skin: Skin is warm, dry and intact. Chest and breasts examined   Psychiatric: She has a normal mood and affect. Her speech is normal and behavior is normal.     Visit Vitals  Ht 5' 6\" (1.676 m)   Wt 141 lb (64 kg)   BMI 22.76 kg/m²     ASSESSMENT and PLAN  Encounter Diagnoses   Name Primary?  Malignant neoplasm of lower-outer quadrant of right female breast, unspecified estrogen receptor status ([facility] [address] Yes    Biallelic mutation of MANJEET gene     S/P lumpectomy, right breast     S/P radiation therapy      Normal exam with no evidence of malignancy. Taking letrozole and tolerating well. BDmammogram due in 4/2020. Will likely have closer to home at [facility]. RTC here in 6 months or sooner PRN. She is comfortable with this plan. All questions answered and she and her sister stated understanding.

## 2019-11-14 NOTE — PATIENT INSTRUCTIONS

## 2019-11-25 DIAGNOSIS — E78.5 HYPERLIPIDEMIA, UNSPECIFIED HYPERLIPIDEMIA TYPE: ICD-10-CM

## 2019-11-26 RX ORDER — PRAVASTATIN SODIUM 10 MG/1
TABLET ORAL
Qty: 30 TAB | Refills: 6 | Status: SHIPPED | OUTPATIENT
Start: 2019-11-26 | End: 2020-08-18 | Stop reason: SDUPTHER

## 2019-12-05 ENCOUNTER — TELEPHONE (OUTPATIENT)
Dept: FAMILY MEDICINE CLINIC | Age: 64
End: 2019-12-05

## 2019-12-06 ENCOUNTER — OFFICE VISIT (OUTPATIENT)
Dept: FAMILY MEDICINE CLINIC | Age: 64
End: 2019-12-06

## 2019-12-06 VITALS
OXYGEN SATURATION: 97 % | BODY MASS INDEX: 23.4 KG/M2 | DIASTOLIC BLOOD PRESSURE: 83 MMHG | HEIGHT: 66 IN | HEART RATE: 79 BPM | WEIGHT: 145.6 LBS | SYSTOLIC BLOOD PRESSURE: 121 MMHG | TEMPERATURE: 98.3 F | RESPIRATION RATE: 16 BRPM

## 2019-12-06 DIAGNOSIS — R79.89 ELEVATED TSH: ICD-10-CM

## 2019-12-06 DIAGNOSIS — R26.81 UNSTEADY GAIT: ICD-10-CM

## 2019-12-06 DIAGNOSIS — F02.80 EARLY ONSET ALZHEIMER'S DEMENTIA WITHOUT BEHAVIORAL DISTURBANCE (HCC): Primary | ICD-10-CM

## 2019-12-06 DIAGNOSIS — G30.0 EARLY ONSET ALZHEIMER'S DEMENTIA WITHOUT BEHAVIORAL DISTURBANCE (HCC): Primary | ICD-10-CM

## 2019-12-06 DIAGNOSIS — R29.6 MULTIPLE FALLS: ICD-10-CM

## 2019-12-06 RX ORDER — MULTIVITAMIN
1 TABLET ORAL 2 TIMES DAILY
COMMUNITY

## 2019-12-06 RX ORDER — DIPHENHYDRAMINE HCL 25 MG
25 TABLET ORAL
COMMUNITY
End: 2020-09-21

## 2019-12-06 NOTE — PROGRESS NOTES
Subjective: Chief Complaint Patient presents with  Medication Evaluation 1 month f/u HPI: 
60 y.o.  presents for follow up appointment. At last visit, she had one fall reported per . Since then,  notes that she fell again Monday, not witnessed but she reported that she fell when he was at work. She denied any injury. She has sister that comes to her house during the day but she is home alone for couple hours of time during the day while  is at work. She has not tried to leave the house or cook.  says she usually just watches TV. She is taking same medications for her alzheimer dementia, max doses of Aricept and Namenda. No nausea, eating well. Malignant neoplasm of right breast in female, estrogen receptor positive (CMS/[facility]) Staging form: Breast, AJCC 8th Edition - Pathologic: pT1c, pN0, cM0, ER+, [place]: Unknown, HER2 She has been seen by breast cancer specialists since last visit. Has mammogram scheduled for April. No changes found. Heme/Onc discontinued Arimidex due to a number of side effects including fatigue, nausea, hot flashes and started on Femara in October. She has been doing better on the Femara per  but she does seem a little unsteady on her feet at times per . Has follow up with heme/onc next month. No hospital, ER or specialist visits since last primary care visit except as noted above. Past Medical History:  
Diagnosis Date  Breast cancer ([facility] [address] 2008 Left  Hyperlipidemia 6/30/2017  Memory disorder  Radiation therapy complication Lt breast 2008--no chemo Social History Tobacco Use  Smoking status: Never Smoker  Smokeless tobacco: Never Used Substance Use Topics  Alcohol use: No  
 Drug use: No  
 
 
Outpatient Medications Marked as Taking for the 12/6/19 encounter (Office Visit) with Armando Aviles NP Medication Sig Dispense Refill  calcium-cholecalciferol, D3, (CALTRATE 600+D) tablet Take 1 Tab by mouth daily.  diphenhydrAMINE (BENADRYL) 25 mg tablet Take 25 mg by mouth every six (6) hours as needed for Sleep.  pravastatin (PRAVACHOL) 10 mg tablet take 1 tablet by mouth at bedtime 30 Tab 6  
 letrozole (FEMARA) 2.5 mg tablet Take 2.5 mg by mouth.  pumpkin seed extract/soy germ (AZO BLADDER CONTROL PO) Take  by mouth.  multivitamin with iron tablet Take 1 Tab by mouth daily.  omega 3-dha-epa-fish oil (FISH OIL) 100-160-1,000 mg cap Take  by mouth.  donepezil (ARICEPT) 10 mg tablet Take 1 Tab by mouth nightly. 30 Tab 11  
 memantine (NAMENDA) 10 mg tablet take 1 tablet by mouth twice a day 60 Tab 11  
 ondansetron (ZOFRAN ODT) 4 mg disintegrating tablet Take 1 Tab by mouth every eight (8) hours as needed for Nausea. 5 Tab 1 Allergies Allergen Reactions  Pcn [facility]illins] [facility] reviewed . ROS: 
Gen: some fatigue, no fever, no chills, no unexplained weight loss or weight gain Eyes: no excessive tearing, itching, or discharge Nose: no rhinorrhea, no sinus pain Mouth: no oral lesions, no sore throat, no difficulty swallowing Resp: no shortness of breath, no wheezing, no cough CV: no chest pain, no orthopnea, no paroxysmal nocturnal dyspnea, no lower extremity edema, no palpitations Abd: no nausea, no heartburn, no diarrhea, no constipation, no abdominal pain Neuro: no headaches, no syncope or presyncopal episodes Endo: no polyuria, no polydipsia. : no hematuria, no dysuria, no frequency, no incontinence Heme: no lymphadenopathy, no easy bruising or bleeding, no night sweats MSK: no joint pain or swelling PE: 
Visit Vitals /83 (BP 1 Location: Left arm, BP Patient Position: Sitting) Pulse 79 Temp 98.3 °F (36.8 °C) (Oral) Resp 16 Ht 5' 6\" (1.676 m) Wt 145 lb 9.6 oz (66 kg) LMP  (LMP Unknown) SpO2 97% BMI 23.50 kg/m² Gen: alert, oriented to self and person only, no acute distress Head: normocephalic, atraumatic Ears: external auditory canals clear, TMs without erythema or effusion Eyes: pupils equal round reactive to light, sclera clear, conjunctiva clear Oral: moist mucus membranes, no oral lesions, no pharyngeal inflammation or exudate Neck: symmetric normal sized thyroid, no carotid bruits, no jugular vein distention Resp: no increase work of breathing, lungs clear to ausculation bilaterally, no wheezing, rales or rhonchi CV: S1, S2 normal.  No murmurs, rubs, or gallops. Abd: soft, not tender, not distended. No hepatosplenomegaly. Normal bowel sounds. No hernias. No abdominal or renal bruits. Neuro: cranial nerves intact, normal strength and movement in all extremities, sensation intact and symmetric. Skin: no lesion or rash Extremities: no cyanosis or edema No results found for this visit on 12/06/19. Assessment/Plan: 
Differential diagnosis and treatment options reviewed with patient who is in agreement with treatment plan as outlined below. ICD-10-CM ICD-9-CM 1. Early onset Alzheimer's dementia without behavioral disturbance ([facility] [address] N82.4 883.0 METABOLIC PANEL, COMPREHENSIVE  
 F02.80 294.10   
2. Unsteady gait L32.55 287.2 METABOLIC PANEL, COMPREHENSIVE  
   OTHER 3. Multiple falls H98.6 C88.61 METABOLIC PANEL, COMPREHENSIVE  
   OTHER 4. Elevated TSH R79.89 794.5 THYROID CASCADE PROFILE Repeat TSH today. Advised  that she should not be left alone if she has had unwitnessed falls. He says that his daughter will be in town and staying with them for a month starting next week and he will have her sisters come over to stay with her during the day until then. Will try [facility] for PT/OT evaluation. Discussed mind activities with  that could help with alzheimer/dementia. Discussed BMI and healthy weight.  Encouraged patient to work to implement changes including diet high in raw fruits and vegetables, lean protein and good fats. Limit refined, processed carbohydrates and sugar. Encouraged regular exercise with supervision. Follow up with Heme/Onc next month. Follow up with me in 2 months or sooner if needed. I have discussed the diagnosis with the patient and the intended plan as seen in the above orders. The patient has received an after-visit summary and questions were answered concerning future plans. I have discussed medication side effects and warnings with the patient as well. The patient verbalizes understanding and agreement with the plan.

## 2019-12-06 NOTE — PATIENT INSTRUCTIONS
Preventing Falls: Care Instructions Your Care Instructions Getting around your home safely can be a challenge if you have injuries or health problems that make it easy for you to fall. Loose rugs and furniture in walkways are among the dangers for many older people who have problems walking or who have poor eyesight. People who have conditions such as arthritis, osteoporosis, or dementia also have to be careful not to fall. You can make your home safer with a few simple measures. Follow-up care is a key part of your treatment and safety. Be sure to make and go to all appointments, and call your doctor if you are having problems. It's also a good idea to know your test results and keep a list of the medicines you take. How can you care for yourself at home? Taking care of yourself · You may get dizzy if you do not drink enough water. To prevent dehydration, drink plenty of fluids, enough so that your urine is light yellow or clear like water. Choose water and other caffeine-free clear liquids. If you have kidney, heart, or liver disease and have to limit fluids, talk with your doctor before you increase the amount of fluids you drink. · Exercise regularly to improve your strength, muscle tone, and balance. Walk if you can. Swimming may be a good choice if you cannot walk easily. · Have your vision and hearing checked each year or any time you notice a change. If you have trouble seeing and hearing, you might not be able to avoid objects and could lose your balance. · Know the side effects of the medicines you take. Ask your doctor or pharmacist whether the medicines you take can affect your balance. Sleeping pills or sedatives can affect your balance. · Limit the amount of alcohol you drink. Alcohol can impair your balance and other senses. · Ask your doctor whether calluses or corns on your feet need to be removed.  If you wear loose-fitting shoes because of calluses or corns, you can lose your balance and fall. · Talk to your doctor if you have numbness in your feet. Preventing falls at home · Remove raised doorway thresholds, throw rugs, and clutter. Repair loose carpet or raised areas in the floor. · Move furniture and electrical cords to keep them out of walking paths. · Use nonskid floor wax, and wipe up spills right away, especially on ceramic tile floors. · If you use a walker or cane, put rubber tips on it. If you use crutches, clean the bottoms of them regularly with an abrasive pad, such as steel wool. · Keep your house well lit, especially Katharine Nyhan, and outside walkways. Use night-lights in areas such as hallways and bathrooms. Add extra light switches or use remote switches (such as switches that go on or off when you clap your hands) to make it easier to turn lights on if you have to get up during the night. · Install sturdy handrails on stairways. · Move items in your cabinets so that the things you use a lot are on the lower shelves (about waist level). · Keep a cordless phone and a flashlight with new batteries by your bed. If possible, put a phone in each of the main rooms of your house, or carry a cell phone in case you fall and cannot reach a phone. Or, you can wear a device around your neck or wrist. You push a button that sends a signal for help. · Wear low-heeled shoes that fit well and give your feet good support. Use footwear with nonskid soles. Check the heels and soles of your shoes for wear. Repair or replace worn heels or soles. · Do not wear socks without shoes on wood floors. · Walk on the grass when the sidewalks are slippery. If you live in an area that gets snow and ice in the winter, sprinkle salt on slippery steps and sidewalks. Preventing falls in the bath · Install grab bars and nonskid mats inside and outside your shower or tub and near the toilet and sinks. · Use shower chairs and bath benches. · Use a hand-held shower head that will allow you to sit while showering. · Get into a tub or shower by putting the weaker leg in first. Get out of a tub or shower with your strong side first. 
· Repair loose toilet seats and consider installing a raised toilet seat to make getting on and off the toilet easier. · Keep your bathroom door unlocked while you are in the shower. Where can you learn more? Go to http://shelby-shanika.info/. Enter [id number] [id number] in the search box to learn more about \"Preventing Falls: Care Instructions. \" Current as of: November 7, 2018 Content Version: 12.2 © 3258-5691 [employer]. Care instructions adapted under license by [employer] (which disclaims liability or warranty for this information). If you have questions about a medical condition or this instruction, always ask your healthcare professional. [place] [address] any warranty or liability for your use of this information. Alzheimer's Disease: Care Instructions Your Care Instructions Alzheimer's disease is a type of dementia. It causes memory loss and affects judgment, language, and behavior. You may have trouble making decisions or may get lost in places that you used to know well. Alzheimer's disease is different than mild memory loss that occurs with aging. It is not clear what causes Alzheimer's disease, but it is the most common form of dementia in older adults. Finding out that you have this disease is a shock. You may be afraid and worried about how the condition will change your life. Although there is no cure at this time, medicine in some cases may slow memory loss for a while. Other medicines may be able to help you sleep or cope with depression and behavior changes. Alzheimer's disease is different for everyone. It may take many years to develop. In some cases, people can function well for a long time.  In the early stage of the disease, you can do things at home to make life easier and safer. You also can keep doing your hobbies and other activities. Many people find comfort in planning now for their future needs. Follow-up care is a key part of your treatment and safety. Be sure to make and go to all appointments, and call your doctor if you are having problems. It's also a good idea to know your test results and keep a list of the medicines you take. How can you care for yourself at home? Taking care of yourself · If your doctor gives you medicines, take them exactly as prescribed. Call your doctor if you think you are having a problem with your medicine. You will get more details on the medicines your doctor prescribes. · Eat a balanced diet. Get plenty of whole grains, fruits, and vegetables every day. If you are not hungry at mealtimes, eat snacks at midmorning and in the afternoon. Try drinks such as Boost, Ensure, or Sustacal if you are having trouble keeping your weight up. · Stay active. Exercise such as walking may slow the decline of your mental abilities. Try to stay active mentally too. Read and work crossword puzzles if you enjoy these activities. · If you have trouble sleeping, do not nap during the day. Get regular exercise (but not within several hours of bedtime). Drink a glass of warm milk or caffeine-free herbal tea before going to bed. · Ask your doctor about support groups and other resources in your area. They can help people who have Alzheimer's disease and their families. · Be patient. You may find that a task takes you longer than it used to. · If you have not already done so, make a list of advance directives. Advance directives are instructions to your doctor and family members about what kind of care you want if you become unable to speak or express yourself. Talk to a  about making a will, if you do not already have one. Keeping schedules · Develop a routine. You will feel less frustrated or confused if you have a clear, simple plan of what to do every day. ? Make lists of your medicines and when to take them. ? Write down appointments and other tasks in a calendar. ? Put sticky notes around the house to help you remember events and other things you have to do. ? Schedule activities and tasks for times of the day when you are best able to handle them. Staying safe · Tell someone when you are going out and where you are going. Let the person know when you will be back. Before you go out alone, write down where you are going, how to get there, and how to get back home. Do this even if you have gone there many times before. Take someone along with you when possible. · Make your home safe. Tack down rugs, put no-slip tape in the tub, use handrails, and put safety switches on stoves and appliances. · Have a family member or other caregiver tell you whether you are driving badly. Deciding to stop driving is very hard for many people. Driving helps you feel independent. Your state 's license bureau can do a driving test if there is any question. Plan for other means of getting around when you are no longer able to drive. · Use strong lighting, especially at night. Put night-lights in bedrooms, hallways, and bathrooms. · Lower the hot water temperature setting to 120°F or lower to avoid burns. When should you call for help? Call 911 anytime you think you may need emergency care. For example, call if: 
  · You are lost and do not know whom to call.  
  · You are injured and do not know whom to call.  
 Call your doctor now or seek immediate medical care if: 
  · Your symptoms suddenly get much worse.  
 Watch closely for changes in your health, and be sure to contact your doctor if: 
  · You want more information about how you can take care of yourself. Where can you learn more? Go to http://shelby-shanika.info/. Enter Y179 in the search box to learn more about \"Alzheimer's Disease: Care Instructions. \" Current as of: May 28, 2019 Content Version: 12.2 © 1136-7797 [employer]. Care instructions adapted under license by [employer] (which disclaims liability or warranty for this information). If you have questions about a medical condition or this instruction, always ask your healthcare professional. [place] [address] any warranty or liability for your use of this information.

## 2019-12-06 NOTE — PROGRESS NOTES
Chief Complaint Patient presents with  Medication Evaluation 1 month f/u 1. Have you been to the ER, urgent care clinic since your last visit? Hospitalized since your last visit? Yes When: Pt had a fall, went to  for ankle pain 2. Have you seen or consulted any other health care providers outside of the [address] [place] [address] since your last visit? Include any pap smears or colon screening. No 
 
 
Health maintenance reviewed. Pt informed of health maintenance past due and/or upcoming. Pt verbalized understanding. Health Maintenance Due Topic Date Due  Shingrix Vaccine Age 50> (1 of 2) 12/03/2007 Pt is not fasting this visit. Fall Risk Assessment, last 12 mths 12/6/2019 Able to walk? Yes Fall in past 12 months? Yes Fall with injury? Yes  
Number of falls in past 12 months 2 Fall Risk Score 3

## 2019-12-07 LAB
ALBUMIN SERPL-MCNC: 4.5 G/DL (ref 3.6–4.8)
ALBUMIN/GLOB SERPL: 1.7 {RATIO} (ref 1.2–2.2)
ALP SERPL-CCNC: 45 IU/L (ref 39–117)
ALT SERPL-CCNC: 26 IU/L (ref 0–32)
AST SERPL-CCNC: 20 IU/L (ref 0–40)
BILIRUB SERPL-MCNC: 0.4 MG/DL (ref 0–1.2)
BUN SERPL-MCNC: 13 MG/DL (ref 8–27)
BUN/CREAT SERPL: 14 (ref 12–28)
CALCIUM SERPL-MCNC: 10 MG/DL (ref 8.7–10.3)
CHLORIDE SERPL-SCNC: 101 MMOL/L (ref 96–106)
CO2 SERPL-SCNC: 26 MMOL/L (ref 20–29)
CREAT SERPL-MCNC: 0.91 MG/DL (ref 0.57–1)
GLOBULIN SER CALC-MCNC: 2.7 G/DL (ref 1.5–4.5)
GLUCOSE SERPL-MCNC: 72 MG/DL (ref 65–99)
INTERPRETIVE COMMENT, 330017: ABNORMAL
POTASSIUM SERPL-SCNC: 4.3 MMOL/L (ref 3.5–5.2)
PROT SERPL-MCNC: 7.2 G/DL (ref 6–8.5)
SODIUM SERPL-SCNC: 142 MMOL/L (ref 134–144)
T4 FREE SERPL-MCNC: 0.99 NG/DL (ref 0.82–1.77)
THYROPEROXIDASE AB SERPL-ACNC: >600 IU/ML (ref 0–34)
TSH SERPL DL<=0.005 MIU/L-ACNC: 4.93 UIU/ML (ref 0.45–4.5)

## 2019-12-09 ENCOUNTER — TELEPHONE (OUTPATIENT)
Dept: FAMILY MEDICINE CLINIC | Age: 64
End: 2019-12-09

## 2019-12-09 NOTE — TELEPHONE ENCOUNTER
Leonardo Tidwell from [facility] is calling to speak with Priti Coy regarding patient. She stated that she can be reached at [phone].

## 2019-12-09 NOTE — TELEPHONE ENCOUNTER
Called HH and they wanted to voiced that they received HH order. They always wanted to know which MD would sign orders, voiced to them either Dr. Godfrey Bob or Dr. Kalpesh Luna, they voiced understanding.

## 2019-12-10 NOTE — PROGRESS NOTES
Called pt, spoke w/, verified name and . Informed pt that per Chidi Craig her thyroid level is still a little elevated. We will fax this to her Heme/Onc doctor. Pts  stated understanding. Printed labs and put on [address] desk to be faxed.

## 2019-12-10 NOTE — PROGRESS NOTES
Her thyroid level is still a little elevated. I am would like to fax this to her Heme/Onc doctor. Wonder if her cancer treatment is causing the abnormality in the levels. Please print these labs and I will fill out fax cover sheet.  Thanks

## 2019-12-11 DIAGNOSIS — D33.3 CPA (CEREBELLOPONTINE ANGLE) TUMOR (HCC): Primary | ICD-10-CM

## 2019-12-11 RX ORDER — LEVOTHYROXINE SODIUM 25 UG/1
25 TABLET ORAL
Qty: 90 TAB | Refills: 0 | Status: SHIPPED | OUTPATIENT
Start: 2019-12-11 | End: 2020-04-06 | Stop reason: SDUPTHER

## 2019-12-11 NOTE — PROGRESS NOTES
Called pts , verified name and . Informed pt that per Dr. 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 He stated that these MRI's cost him $1500 out of pocket and he would like to take another route b/c he doesn't feel this is necessary.

## 2019-12-11 NOTE — PROGRESS NOTES
Appears to have a little underactive thyroid. Will start on low dose levothyroxine and recheck levels in 8-10 weeks.

## 2019-12-11 NOTE — PROGRESS NOTES
Called pt, verified name and . Informed pt that per [place] Officer to have a little underactive thyroid. Will start on low dose levothyroxine and recheck levels in 8-10 weeks. Pt stated understanding.

## 2019-12-11 NOTE — PROGRESS NOTES
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

## 2019-12-11 NOTE — PROGRESS NOTES
I called and spoke to Dr Florian Ours (Heme/Onc)  JAY Dominguez, discussed labs. She will pass on findings to NP or MD and get back in touch with me regarding elevated TSH and TPO and if they think the elevation is secondary to cancer treatment and/or if we should treat with synthroid or not.

## 2019-12-12 NOTE — PROGRESS NOTES
Called pts , informed him that per Tona Ewing there is no other test to look at this. We should re-check MRI because there is a concern that this tumor has grown and is causing her falls because it is located near her center or balance. He stated understanding and is ok w/having the test done.

## 2019-12-26 ENCOUNTER — HOSPITAL ENCOUNTER (OUTPATIENT)
Dept: MRI IMAGING | Age: 64
Discharge: HOME | End: 2019-12-26
Attending: PSYCHIATRY & NEUROLOGY
Payer: COMMERCIAL

## 2019-12-26 DIAGNOSIS — D33.3 CPA (CEREBELLOPONTINE ANGLE) TUMOR (HCC): ICD-10-CM

## 2019-12-26 PROCEDURE — A9575 INJ GADOTERATE MEGLUMI 0.1ML: HCPCS | Performed by: PSYCHIATRY & NEUROLOGY

## 2019-12-26 PROCEDURE — 74011250636 HC RX REV CODE- 250/636: Performed by: PSYCHIATRY & NEUROLOGY

## 2019-12-26 PROCEDURE — 70553 MRI BRAIN STEM W/O & W/DYE: CPT

## 2019-12-26 RX ORDER — GADOTERATE MEGLUMINE 376.9 MG/ML
15 INJECTION INTRAVENOUS
Status: COMPLETED | OUTPATIENT
Start: 2019-12-26 | End: 2019-12-26

## 2019-12-26 RX ADMIN — GADOTERATE MEGLUMINE 15 ML: 376.9 INJECTION INTRAVENOUS at 12:25

## 2019-12-30 DIAGNOSIS — D33.3 RIGHT ACOUSTIC NEUROMA (HCC): Primary | ICD-10-CM

## 2020-01-01 ENCOUNTER — TELEPHONE (OUTPATIENT)
Dept: FAMILY MEDICINE CLINIC | Age: 65
End: 2020-01-01

## 2020-01-01 ENCOUNTER — VIRTUAL VISIT (OUTPATIENT)
Dept: NEUROLOGY | Age: 65
End: 2020-01-01
Payer: MEDICARE

## 2020-01-01 ENCOUNTER — HOSPITAL ENCOUNTER (OUTPATIENT)
Dept: CT IMAGING | Age: 65
Discharge: HOME | End: 2020-11-06
Attending: NEUROLOGICAL SURGERY
Payer: MEDICARE

## 2020-01-01 ENCOUNTER — TELEPHONE (OUTPATIENT)
Dept: NEUROLOGY | Age: 65
End: 2020-01-01

## 2020-01-01 ENCOUNTER — OFFICE VISIT (OUTPATIENT)
Dept: FAMILY MEDICINE CLINIC | Age: 65
End: 2020-01-01
Payer: MEDICARE

## 2020-01-01 ENCOUNTER — TRANSCRIBE ORDER (OUTPATIENT)
Dept: SCHEDULING | Age: 65
End: 2020-01-01

## 2020-01-01 VITALS
OXYGEN SATURATION: 97 % | BODY MASS INDEX: 25.15 KG/M2 | WEIGHT: 156.5 LBS | DIASTOLIC BLOOD PRESSURE: 76 MMHG | HEIGHT: 66 IN | HEART RATE: 68 BPM | SYSTOLIC BLOOD PRESSURE: 136 MMHG | RESPIRATION RATE: 11 BRPM | TEMPERATURE: 99 F

## 2020-01-01 VITALS
HEART RATE: 97 BPM | SYSTOLIC BLOOD PRESSURE: 114 MMHG | OXYGEN SATURATION: 97 % | WEIGHT: 150 LBS | RESPIRATION RATE: 16 BRPM | TEMPERATURE: 98 F | HEIGHT: 66 IN | DIASTOLIC BLOOD PRESSURE: 73 MMHG | BODY MASS INDEX: 24.11 KG/M2

## 2020-01-01 DIAGNOSIS — F02.80 ALZHEIMER'S DEMENTIA WITHOUT BEHAVIORAL DISTURBANCE, UNSPECIFIED TIMING OF DEMENTIA ONSET: Primary | ICD-10-CM

## 2020-01-01 DIAGNOSIS — G30.9 ALZHEIMER'S DEMENTIA WITHOUT BEHAVIORAL DISTURBANCE, UNSPECIFIED TIMING OF DEMENTIA ONSET: Primary | ICD-10-CM

## 2020-01-01 DIAGNOSIS — G91.9 HYDROCEPHALUS, UNSPECIFIED TYPE (HCC): ICD-10-CM

## 2020-01-01 DIAGNOSIS — R53.1 WEAKNESS GENERALIZED: ICD-10-CM

## 2020-01-01 DIAGNOSIS — Z98.2 PRESENCE OF CEREBROSPINAL FLUID DRAINAGE DEVICE: ICD-10-CM

## 2020-01-01 DIAGNOSIS — Z17.1 MALIGNANT NEOPLASM OF UPPER-OUTER QUADRANT OF RIGHT BREAST IN FEMALE, ESTROGEN RECEPTOR NEGATIVE (HCC): ICD-10-CM

## 2020-01-01 DIAGNOSIS — Z00.00 MEDICARE ANNUAL WELLNESS VISIT, SUBSEQUENT: Primary | ICD-10-CM

## 2020-01-01 DIAGNOSIS — Z98.2 S/P VP SHUNT: ICD-10-CM

## 2020-01-01 DIAGNOSIS — C50.411 MALIGNANT NEOPLASM OF UPPER-OUTER QUADRANT OF RIGHT BREAST IN FEMALE, ESTROGEN RECEPTOR NEGATIVE (HCC): ICD-10-CM

## 2020-01-01 DIAGNOSIS — Z13.39 SCREENING FOR ALCOHOLISM: ICD-10-CM

## 2020-01-01 DIAGNOSIS — Z91.81 AT RISK FOR FALLS: ICD-10-CM

## 2020-01-01 DIAGNOSIS — Z09 HOSPITAL DISCHARGE FOLLOW-UP: ICD-10-CM

## 2020-01-01 DIAGNOSIS — F02.80 ALZHEIMER'S DEMENTIA WITHOUT BEHAVIORAL DISTURBANCE, UNSPECIFIED TIMING OF DEMENTIA ONSET: ICD-10-CM

## 2020-01-01 DIAGNOSIS — G30.9 ALZHEIMER'S DEMENTIA WITHOUT BEHAVIORAL DISTURBANCE, UNSPECIFIED TIMING OF DEMENTIA ONSET: ICD-10-CM

## 2020-01-01 DIAGNOSIS — Z98.2 PRESENCE OF CEREBROSPINAL FLUID DRAINAGE DEVICE: Primary | ICD-10-CM

## 2020-01-01 DIAGNOSIS — R26.81 UNSTEADY GAIT: ICD-10-CM

## 2020-01-01 LAB
GLUCOSE BLD STRIP.AUTO-MCNC: 107 MG/DL (ref 65–100)
SERVICE CMNT-IMP: ABNORMAL

## 2020-01-01 PROCEDURE — 99214 OFFICE O/P EST MOD 30 MIN: CPT | Performed by: PSYCHIATRY & NEUROLOGY

## 2020-01-01 PROCEDURE — 74011250637 HC RX REV CODE- 250/637: Performed by: NEUROLOGICAL SURGERY

## 2020-01-01 PROCEDURE — 74011250637 HC RX REV CODE- 250/637: Performed by: NURSE PRACTITIONER

## 2020-01-01 PROCEDURE — 99214 OFFICE O/P EST MOD 30 MIN: CPT | Performed by: NURSE PRACTITIONER

## 2020-01-01 PROCEDURE — 92526 ORAL FUNCTION THERAPY: CPT | Performed by: SPEECH-LANGUAGE PATHOLOGIST

## 2020-01-01 PROCEDURE — 70450 CT HEAD/BRAIN W/O DYE: CPT

## 2020-01-01 PROCEDURE — G9899 SCRN MAM PERF RSLTS DOC: HCPCS | Performed by: PSYCHIATRY & NEUROLOGY

## 2020-01-01 PROCEDURE — G8420 CALC BMI NORM PARAMETERS: HCPCS | Performed by: NURSE PRACTITIONER

## 2020-01-01 PROCEDURE — G8428 CUR MEDS NOT DOCUMENT: HCPCS | Performed by: PSYCHIATRY & NEUROLOGY

## 2020-01-01 PROCEDURE — 3017F COLORECTAL CA SCREEN DOC REV: CPT | Performed by: PSYCHIATRY & NEUROLOGY

## 2020-01-01 PROCEDURE — G8420 CALC BMI NORM PARAMETERS: HCPCS | Performed by: PSYCHIATRY & NEUROLOGY

## 2020-01-01 PROCEDURE — G8432 DEP SCR NOT DOC, RNG: HCPCS | Performed by: NURSE PRACTITIONER

## 2020-01-01 PROCEDURE — 74011250636 HC RX REV CODE- 250/636: Performed by: NEUROLOGICAL SURGERY

## 2020-01-01 PROCEDURE — G8432 DEP SCR NOT DOC, RNG: HCPCS | Performed by: PSYCHIATRY & NEUROLOGY

## 2020-01-01 PROCEDURE — 1111F DSCHRG MED/CURRENT MED MERGE: CPT | Performed by: NURSE PRACTITIONER

## 2020-01-01 PROCEDURE — 82962 GLUCOSE BLOOD TEST: CPT

## 2020-01-01 PROCEDURE — G0439 PPPS, SUBSEQ VISIT: HCPCS | Performed by: NURSE PRACTITIONER

## 2020-01-01 PROCEDURE — 3017F COLORECTAL CA SCREEN DOC REV: CPT | Performed by: NURSE PRACTITIONER

## 2020-01-01 PROCEDURE — G9899 SCRN MAM PERF RSLTS DOC: HCPCS | Performed by: NURSE PRACTITIONER

## 2020-01-01 PROCEDURE — G8427 DOCREV CUR MEDS BY ELIG CLIN: HCPCS | Performed by: NURSE PRACTITIONER

## 2020-01-01 RX ORDER — ACETAMINOPHEN 325 MG/1
650 TABLET ORAL
Qty: 10 TAB | Refills: 0 | Status: SHIPPED
Start: 2020-01-01

## 2020-01-01 RX ORDER — LEVOTHYROXINE SODIUM 25 UG/1
25 TABLET ORAL
Qty: 90 TAB | Refills: 0 | Status: SHIPPED | OUTPATIENT
Start: 2020-01-01 | End: 2021-01-01 | Stop reason: SDUPTHER

## 2020-01-01 RX ORDER — HYDROCODONE BITARTRATE AND ACETAMINOPHEN 5; 325 MG/1; MG/1
1 TABLET ORAL
Qty: 12 TAB | Refills: 0 | Status: SHIPPED | OUTPATIENT
Start: 2020-01-01 | End: 2020-01-01

## 2020-01-01 RX ORDER — MEMANTINE HYDROCHLORIDE 10 MG/1
TABLET ORAL
Qty: 180 TAB | Refills: 3 | Status: SHIPPED | OUTPATIENT
Start: 2020-01-01

## 2020-01-01 RX ORDER — DONEPEZIL HYDROCHLORIDE 10 MG/1
TABLET, FILM COATED ORAL
Qty: 30 TAB | Refills: 11 | Status: SHIPPED | OUTPATIENT
Start: 2020-01-01 | End: 2020-01-01 | Stop reason: SDUPTHER

## 2020-01-01 RX ORDER — DONEPEZIL HYDROCHLORIDE 10 MG/1
TABLET, FILM COATED ORAL
Qty: 30 TAB | Refills: 11 | Status: SHIPPED | OUTPATIENT
Start: 2020-01-01

## 2020-01-01 RX ADMIN — LEVOTHYROXINE SODIUM 25 MCG: 0.03 TABLET ORAL at 07:48

## 2020-01-01 RX ADMIN — Medication 10 ML: at 06:00

## 2020-01-01 RX ADMIN — HYDROCODONE BITARTRATE AND ACETAMINOPHEN 1 TABLET: 5; 325 TABLET ORAL at 04:21

## 2020-01-01 RX ADMIN — LETROZOLE 2.5 MG: 2.5 TABLET ORAL at 08:10

## 2020-01-01 RX ADMIN — HYDROCODONE BITARTRATE AND ACETAMINOPHEN 1 TABLET: 5; 325 TABLET ORAL at 09:52

## 2020-01-01 RX ADMIN — MEMANTINE HYDROCHLORIDE 10 MG: 10 TABLET ORAL at 08:10

## 2020-01-02 ENCOUNTER — DOCUMENTATION ONLY (OUTPATIENT)
Dept: NEUROLOGY | Age: 65
End: 2020-01-02

## 2020-02-06 ENCOUNTER — DOCUMENTATION ONLY (OUTPATIENT)
Dept: NEUROLOGY | Age: 65
End: 2020-02-06

## 2020-02-07 ENCOUNTER — OFFICE VISIT (OUTPATIENT)
Dept: FAMILY MEDICINE CLINIC | Age: 65
End: 2020-02-07

## 2020-02-07 VITALS
OXYGEN SATURATION: 96 % | BODY MASS INDEX: 24.08 KG/M2 | SYSTOLIC BLOOD PRESSURE: 129 MMHG | DIASTOLIC BLOOD PRESSURE: 81 MMHG | TEMPERATURE: 98.4 F | HEART RATE: 69 BPM | HEIGHT: 66 IN | RESPIRATION RATE: 16 BRPM | WEIGHT: 149.8 LBS

## 2020-02-07 DIAGNOSIS — R26.81 UNSTEADY GAIT: ICD-10-CM

## 2020-02-07 DIAGNOSIS — E03.9 ACQUIRED HYPOTHYROIDISM: ICD-10-CM

## 2020-02-07 DIAGNOSIS — F02.80 EARLY ONSET ALZHEIMER'S DEMENTIA WITHOUT BEHAVIORAL DISTURBANCE (HCC): Primary | ICD-10-CM

## 2020-02-07 DIAGNOSIS — G30.0 EARLY ONSET ALZHEIMER'S DEMENTIA WITHOUT BEHAVIORAL DISTURBANCE (HCC): Primary | ICD-10-CM

## 2020-02-07 DIAGNOSIS — D33.3 VESTIBULAR SCHWANNOMA (HCC): ICD-10-CM

## 2020-02-07 NOTE — PROGRESS NOTES
Subjective:     Chief Complaint   Patient presents with    Medication Evaluation    Dementia     f/u        HPI:  60 y.o.  presents for follow up appointment. Per oncology note from visit 1/27/2020: Malignant neoplasm of right breast in female, estrogen receptor positive (CMS/HCC)   7/12/2019 Initial Diagnosis   Malignant neoplasm of right breast in female, estrogen receptor positive (CMS/HCC)    7/12/2019 Cancer Staged   Staging form: Breast, AJCC 8th Edition  - Pathologic: pT1c, pN0, cM0, ER+, ME: Unknown, HER2- - Signed by Eloy Garcia MD on 7/12/2019   been on Femara for several months. The only side effect she notes are dry skin and hot flashes. She is using moisturizer cream for the dry skin, and feels the hot flashes are annoying but tolerable. She denies fevers, chills, recent infection. She reports a good appetite and denies nausea. She denies chest pain or back pain. She has dementia and her  accompanies her today and provides much of her medical history and review of systems. I let her know that her bone density test done yesterday was reported as normal.      Suppose to start \"radiation to mass in her brain\", suppose to have one treatment 2/26/2020 Dr Ysabel Byrd- neurosurgery at [facility].  says that she might need a shunt as well. MRI done on 12.26/19:  Possible slight increase in size of left CP angle  vestibular/acoustic schwannoma. Today's measurement approximately 1.4 x 2.1 x  1.8 cm. 2. Progressive development of ventriculomegaly suggesting developing  communicating hydrocephalus. This may likely be related to the known condition  of communicating hydrocephalus in association with IAC CP angle schwannomas.     She had PT come to her house, completed.  says she has not had any more falls. She has a little unsteady gait still but no falls. Dementia is not improving per , no behavioral issues. He is hopeful that the shunt may help.   Has follow up with Dr Gisselle Toney 10/21/2020.     Has been taking levothyroxine per  for the past two months. Will recheck labs today. He has not noticed any improvement in her mental status since starting the thyroid medication. No hospital, ER or specialist visits since last primary care visit except as noted above. Past Medical History:   Diagnosis Date    Breast cancer ([facility] [address] 2008    Left    Hyperlipidemia 6/30/2017    Memory disorder     Radiation therapy complication     Lt breast 2008--no chemo       Social History     Tobacco Use    Smoking status: Never Smoker    Smokeless tobacco: Never Used   Substance Use Topics    Alcohol use: No    Drug use: No       Outpatient Medications Marked as Taking for the 2/7/20 encounter (Office Visit) with Lidia Newton NP   Medication Sig Dispense Refill    levothyroxine (SYNTHROID) 25 mcg tablet Take 1 Tab by mouth Daily (before breakfast). 90 Tab 0    calcium-cholecalciferol, D3, (CALTRATE 600+D) tablet Take 1 Tab by mouth daily.  diphenhydrAMINE (BENADRYL) 25 mg tablet Take 25 mg by mouth every six (6) hours as needed for Sleep. [address], PT/OT to evaluate and treat for unsteady gait, falls at home. 1 Each 0    pravastatin (PRAVACHOL) 10 mg tablet take 1 tablet by mouth at bedtime 30 Tab 6    pumpkin seed extract/soy germ (AZO BLADDER CONTROL PO) Take  by mouth.  multivitamin with iron tablet Take 1 Tab by mouth daily.  omega 3-dha-epa-fish oil (FISH OIL) 100-160-1,000 mg cap Take  by mouth.  donepezil (ARICEPT) 10 mg tablet Take 1 Tab by mouth nightly.  30 Tab 11    memantine (NAMENDA) 10 mg tablet take 1 tablet by mouth twice a day 60 Tab 11       Allergies   Allergen Reactions    Pcn [Penicillins] Hives       Health Maintenance reviewed -      ROS:  Gen: no fatigue, no fever, no chills, no unexplained weight loss or weight gain  Eyes: no excessive tearing, itching, or discharge  Nose: no rhinorrhea, no sinus pain  Mouth: no oral lesions, no sore throat, no difficulty swallowing  Resp: no shortness of breath, no wheezing, no cough  CV: no chest pain, no orthopnea, no paroxysmal nocturnal dyspnea, no lower extremity edema, no palpitations  Abd: no nausea, no heartburn, no diarrhea, no constipation, no abdominal pain  Neuro: no headaches, no syncope or presyncopal episodes  Endo: no polyuria, no polydipsia. : no hematuria, no dysuria, no frequency, no incontinence      PE:  Visit Vitals  /81 (BP 1 Location: Left arm, BP Patient Position: Sitting)   Pulse 69   Temp 98.4 °F (36.9 °C) (Oral)   Resp 16   Ht 5' 6\" (1.676 m)   Wt 149 lb 12.8 oz (67.9 kg)   LMP  (LMP Unknown)   SpO2 96%   BMI 24.18 kg/m²     Gen: alert, no acute distress. Only answers simple yes/no questions. Head: normocephalic, atraumatic  Ears: external auditory canals clear, TMs without erythema or effusion  Eyes: pupils equal round reactive to light, sclera clear, conjunctiva clear  Oral: moist mucus membranes, no oral lesions, no pharyngeal inflammation or exudate  Neck: symmetric normal sized thyroid, no carotid bruits, no jugular vein distention  Resp: no increase work of breathing, lungs clear to ausculation bilaterally, no wheezing, rales or rhonchi  CV: S1, S2 normal.  No murmurs, rubs, or gallops. Abd: soft, not tender, not distended. No hepatosplenomegaly. Normal bowel sounds. No hernias. No abdominal or renal bruits. Neuro: cranial nerves intact, normal strength and movement in all extremities, and sensation intact and symmetric. Skin: no lesion or rash  Extremities: no cyanosis or edema    No results found for this visit on 02/07/20. Assessment/Plan:  Differential diagnosis and treatment options reviewed with  who is in agreement with treatment plan as outlined below. ICD-10-CM ICD-9-CM    1. Early onset Alzheimer's dementia without behavioral disturbance (HCC) G30.0 331.0 CBC WITH AUTOMATED DIFF    F02.80 294.10    2.  Unsteady gait R26.81 781.2    3. Acquired hypothyroidism E03.9 244.9 TSH 3RD GENERATION      T4, FREE      METABOLIC PANEL, BASIC   4. Vestibular schwannoma (HCC) D33.3 225.1      Will recheck thyroid labs today. She will continue to follow with her hematologist/oncologist as directed. She will follow-up with neurosurgeon as well, I informed  that I do not have access to [facility] information and requested that he ask them to send me copies of testing and procedures and office notes. No changes in any medications today. Will call with results of labs    Discussed BMI and healthy weight. Encouraged patient to work to implement changes including diet high in raw fruits and vegetables, lean protein and good fats. Limit refined, processed carbohydrates and sugar. Encouraged regular exercise. Follow-up in 3 months or sooner if needed. Appears that she has neurology follow-up scheduled but not until October, will be seeing neurosurgery for vestibular schwannoma. I have discussed the diagnosis with the patient and the intended plan as seen in the above orders. The patient has received an after-visit summary and questions were answered concerning future plans. I have discussed medication side effects and warnings with the patient as well. The patient verbalizes understanding and agreement with the plan.

## 2020-02-07 NOTE — PROGRESS NOTES
Chief Complaint   Patient presents with    Medication Evaluation    Dementia     f/u       1. Have you been to the ER, urgent care clinic since your last visit? Hospitalized since your last visit? No    2. Have you seen or consulted any other health care providers outside of the [address] [place] [address] since your last visit? Include any pap smears or colon screening. No    Health maintenance reviewed. Pt informed of health maintenance past due and/or upcoming. Pt verbalized understanding. Health Maintenance Due   Topic Date Due    Shingrix Vaccine Age 49> (1 of 2) 12/03/2007     Pt is not fasting this visit.

## 2020-02-07 NOTE — PATIENT INSTRUCTIONS
Helping a Person With Alzheimer's Disease: Care Instructions  Your Care Instructions    Alzheimer's disease is a type of dementia. It affects memory, intelligence, judgment, language, and behavior. It is not clear what causes this disease. But it is the most common form of dementia in older adults. It may take many years to develop. Alzheimer's disease is different than mild memory loss that occurs with aging. Family members usually notice symptoms first. But the person also may realize that something is wrong. Follow-up care is a key part of your loved one's treatment and safety. Be sure to make and go to all appointments, and call your doctor if your loved one is having problems. It's also a good idea to know your loved one's test results and keep a list of the medicines he or she takes. How can you care for your loved one at home? · Develop a routine. The person will feel less frustrated or confused with a clear, simple daily plan. Remind him or her about important facts and events. · Be patient. It may take longer for the person to complete a task than it used to. · Help the person eat a balanced diet. Serve plenty of whole grains, fruits, and vegetables every day. If the person is not eating well at mealtimes, give snacks at midmorning and in the afternoon. Offer drinks such as Boost, Ensure, or Sustacal if he or she is losing weight. · Encourage exercise. Walking and other activity may slow the decline of mental ability. Help the person keep an active mind. Encourage hobbies such as reading and crossword puzzles. · Take steps to help if the person is sundowning. This is the restless behavior and trouble with sleeping that may occur in late afternoon and at night. Try not to let the person nap during the day. Offer a glass of warm milk or caffeine-free tea before bedtime. · Ask family members and friends for help. You may need breaks where others can help care for the person.   · Talk to the person's doctor about what resources are available for help in your area. · Review all of the person's medicines with his or her doctor. · For as long as the person is able, allow him or her to make decisions about activities, food, clothing, and other choices. Let the person be independent, even if tasks take more time or are not done perfectly. Tailor tasks to the person's abilities. For example, if cooking is no longer safe, ask for other help. He or she can help set the table or make simple dishes such as a salad. When the person needs help, offer it gently. Keeping safe  · Make your home (or the person's home) safe. Tack down rugs, and put no-slip tape in the tub. Install handrails, and put safety switches on stoves and appliances. Keep rooms free of clutter. Make sure walkways around furniture are clear. Do not move furniture around, because the person may become confused. · Use locks on doors and cupboards. Lock up knives, scissors, medicines, cleaning supplies, and other dangerous things. · Do not let the person drive or cook if he or she cannot do it safely. A person with Alzheimer's should not drive unless he or she is able to pass an on-road driving test. Your state 's license bureau can do a driving test if there is any question. · Get medical alert jewelry for the person so you can be contacted if he or she wanders away. If possible, provide a safe place for wandering, such as an enclosed yard or garden. When should you call for help? Call 911 anytime you think you may need emergency care.  For example, call if:    · A person who has Alzheimer's disease has disappeared.     · A person who has Alzheimer's disease is seriously injured.   [facility] your doctor now or seek immediate medical care if:    · The person you are caring for suddenly sees or hears things that are not there (hallucinates).     · The person you are caring for has a sudden, drastic change in his or her behavior.    Watch closely for changes in your loved one's health, and be sure to contact the doctor if:    · A person who has Alzheimer's disease gradually gets worse or has symptoms that could cause injury.     · You need help caring for a person with Alzheimer's disease.     · The person has problems with his or her medicine. Where can you learn more? Go to http://shelby-shanika.info/. Enter T723 in the search box to learn more about \"Helping a Person With Alzheimer's Disease: Care Instructions. \"  Current as of: May 28, 2019  Content Version: 12.2  © 0504-4345 [employer]. Care instructions adapted under license by [employer] (which disclaims liability or warranty for this information). If you have questions about a medical condition or this instruction, always ask your healthcare professional. [address] any warranty or liability for your use of this information. Hypothyroidism: Care Instructions  Your Care Instructions    You have hypothyroidism, which means that your body is not making enough thyroid hormone. This hormone helps your body use energy. If your thyroid level is low, you may feel tired, be constipated, have an increase in your blood pressure, or have dry skin or memory problems. You may also get cold easily, even when it is warm. Women with low thyroid levels may have heavy menstrual periods. A blood test to find your thyroid-stimulating hormone (TSH) level is used to check for hypothyroidism. A high TSH level may mean that you have low thyroid. When your body is not making enough thyroid hormone, TSH levels rise in an effort to make the body produce more. The treatment for hypothyroidism is to take thyroid hormone pills. You should start to feel better in 1 to 2 weeks. But it can take several months to see changes in the TSH level. You will need regular visits with your doctor to make sure you have the right dose of medicine.   Most people need treatment for the rest of their lives. You will need to see your doctor regularly to have blood tests and to make sure you are doing well. Follow-up care is a key part of your treatment and safety. Be sure to make and go to all appointments, and call your doctor if you are having problems. It's also a good idea to know your test results and keep a list of the medicines you take. How can you care for yourself at home? · Take your thyroid hormone medicine exactly as prescribed. Call your doctor if you think you are having a problem with your medicine. Most people do not have side effects if they take the right amount of medicine regularly. ? Take the medicine 30 minutes before breakfast, and do not take it with calcium, vitamins, or iron. ? Do not take extra doses of your thyroid medicine. It will not help you get better any faster, and it may cause side effects. ? If you forget to take a dose, do NOT take a double dose of medicine. Take your usual dose the next day. · Tell your doctor about all prescription, herbal, or over-the-counter products you take. · Take care of yourself. Eat a healthy diet, get enough sleep, and get regular exercise. When should you call for help? Call 911 anytime you think you may need emergency care. For example, call if:    · You passed out (lost consciousness).     · You have severe trouble breathing.     · You have a very slow heartbeat (less than 60 beats a minute).     · You have a low body temperature (95°F or below).    Call your doctor now or seek immediate medical care if:    · You feel tired, sluggish, or weak.     · You have trouble remembering things or concentrating.     · You do not begin to feel better 2 weeks after starting your medicine.    Watch closely for changes in your health, and be sure to contact your doctor if you have any problems. Where can you learn more? Go to http://shelby-shanika.info/.   Enter X785 in the search box to learn more about \"Hypothyroidism: Care Instructions. \"  Current as of: November 6, 2018  Content Version: 12.2  © 2368-0793 [employer]. Care instructions adapted under license by [employer] (which disclaims liability or warranty for this information). If you have questions about a medical condition or this instruction, always ask your healthcare professional. [facility] any warranty or liability for your use of this information. Acoustic Neuroma: Care Instructions  Your Care Instructions    An acoustic neuroma is a growth (tumor) on the nerve to the inner ear. It does not turn into cancer. But it can cause hearing loss, ringing in the ear, and dizziness. A large acoustic neuroma can press on the brain and become life-threatening. Acoustic neuromas usually grow very slowly. They are most common in people between the ages of 27 and 61. Your doctor may want to watch a small neuroma to see how fast it grows. You may get regular tests to watch its growth. Neuromas that cause problems may be treated with radiation or surgery. An acoustic neuroma that is removed does not usually grow back. Follow-up care is a key part of your treatment and safety. Be sure to make and go to all appointments, and call your doctor if you are having problems. It's also a good idea to know your test results and keep a list of the medicines you take. How can you care for yourself at home? · Protect your ears from loud sounds. This can prevent hearing loss from getting worse. · Try hearing aids if you have trouble hearing. · Think about joining a support group. Sharing your experiences with other people who have the same problem may help you learn more and cope better. When should you call for help? Call 911 anytime you think you may need emergency care. For example, call if:    · You have symptoms of a stroke.  These may include:  ? Sudden numbness, tingling, weakness, or loss of movement in your face, arm, or leg, especially on only one side of your body. ? Sudden vision changes. ? Sudden trouble speaking. ? Sudden confusion or trouble understanding simple statements. ? Sudden problems with walking or balance. ? A sudden, severe headache that is different from past headaches.    Call your doctor now or seek immediate medical care if:    · You are dizzy or lose your balance.     · You lose hearing in one ear.     · Part of your face droops or sags.     · You have vision problems, such as blurred or double vision, or you can see only out of one eye.     · You slur your words or cannot talk normally.    Watch closely for changes in your health, and be sure to contact your doctor if:    · You do not get better as expected. Where can you learn more? Go to http://shelby-shanika.info/. Enter I618 in the search box to learn more about \"Acoustic Neuroma: Care Instructions. \"  Current as of: October 21, 2018  Content Version: 12.2  © 5247-0790 [employer]. Care instructions adapted under license by [employer] (which disclaims liability or warranty for this information). If you have questions about a medical condition or this instruction, always ask your healthcare professional. [address] any warranty or liability for your use of this information.

## 2020-02-08 LAB
BASOPHILS # BLD AUTO: 0 X10E3/UL (ref 0–0.2)
BASOPHILS NFR BLD AUTO: 1 %
BUN SERPL-MCNC: 15 MG/DL (ref 8–27)
BUN/CREAT SERPL: 17 (ref 12–28)
CALCIUM SERPL-MCNC: 10.1 MG/DL (ref 8.7–10.3)
CHLORIDE SERPL-SCNC: 104 MMOL/L (ref 96–106)
CO2 SERPL-SCNC: 28 MMOL/L (ref 20–29)
CREAT SERPL-MCNC: 0.87 MG/DL (ref 0.57–1)
EOSINOPHIL # BLD AUTO: 0.1 X10E3/UL (ref 0–0.4)
EOSINOPHIL NFR BLD AUTO: 2 %
ERYTHROCYTE [DISTWIDTH] IN BLOOD BY AUTOMATED COUNT: 12.1 % (ref 11.7–15.4)
GLUCOSE SERPL-MCNC: 77 MG/DL (ref 65–99)
HCT VFR BLD AUTO: 37.9 % (ref 34–46.6)
HGB BLD-MCNC: 12.4 G/DL (ref 11.1–15.9)
IMM GRANULOCYTES # BLD AUTO: 0 X10E3/UL (ref 0–0.1)
IMM GRANULOCYTES NFR BLD AUTO: 0 %
LYMPHOCYTES # BLD AUTO: 1.2 X10E3/UL (ref 0.7–3.1)
LYMPHOCYTES NFR BLD AUTO: 32 %
MCH RBC QN AUTO: 27.6 PG (ref 26.6–33)
MCHC RBC AUTO-ENTMCNC: 32.7 G/DL (ref 31.5–35.7)
MCV RBC AUTO: 84 FL (ref 79–97)
MONOCYTES # BLD AUTO: 0.4 X10E3/UL (ref 0.1–0.9)
MONOCYTES NFR BLD AUTO: 10 %
NEUTROPHILS # BLD AUTO: 2.1 X10E3/UL (ref 1.4–7)
NEUTROPHILS NFR BLD AUTO: 55 %
PLATELET # BLD AUTO: 278 X10E3/UL (ref 150–450)
POTASSIUM SERPL-SCNC: 4.5 MMOL/L (ref 3.5–5.2)
RBC # BLD AUTO: 4.49 X10E6/UL (ref 3.77–5.28)
SODIUM SERPL-SCNC: 145 MMOL/L (ref 134–144)
T4 FREE SERPL-MCNC: 1.02 NG/DL (ref 0.82–1.77)
TSH SERPL DL<=0.005 MIU/L-ACNC: 4.14 UIU/ML (ref 0.45–4.5)
WBC # BLD AUTO: 3.8 X10E3/UL (ref 3.4–10.8)

## 2020-02-11 NOTE — PROGRESS NOTES
Called pts ,  verified name and . Informed him that per [facility] stable. Continue current treatment. He did not think she needed any refills right now, but will call back if she does.

## 2020-02-26 ENCOUNTER — TELEPHONE (OUTPATIENT)
Dept: SURGERY | Age: 65
End: 2020-02-26

## 2020-02-26 NOTE — TELEPHONE ENCOUNTER
Returned 's call. He is asking about a refill of patient's Letrozole. Says the pharmacy has contacted our office several times, but have not heard back. He went out to the car to get the bottle. The prescriber on the bottle is not one of our providers. I told him I thought this was prescribed by her medical oncologist at [facility]. I gave him the number to call there. I told him to call back if he had any problems. He is at [address] [place] [address] today with his wife. He was appreciative and will call back if he has problems.

## 2020-03-10 ENCOUNTER — TELEPHONE (OUTPATIENT)
Dept: SURGERY | Age: 65
End: 2020-03-10

## 2020-03-10 NOTE — TELEPHONE ENCOUNTER
Called spoke with patient's  - genetic testing reclassification of BRCA1 VUS to likely benign. MANJEET mutation remains pathogenic.

## 2020-03-13 ENCOUNTER — DOCUMENTATION ONLY (OUTPATIENT)
Dept: SURGERY | Age: 65
End: 2020-03-13

## 2020-03-13 NOTE — PROGRESS NOTES
Awaiting payment from [facility] for long-term disability paperwork completion. I faxed the invoice to Fam Bueno @ [phone].

## 2020-03-17 ENCOUNTER — HOSPITAL ENCOUNTER (OUTPATIENT)
Dept: CT IMAGING | Age: 65
Discharge: HOME | End: 2020-03-17
Attending: NEUROLOGICAL SURGERY
Payer: COMMERCIAL

## 2020-03-17 DIAGNOSIS — D33.3 AN (ACOUSTIC NEUROMA) (HCC): ICD-10-CM

## 2020-03-17 PROCEDURE — 70450 CT HEAD/BRAIN W/O DYE: CPT

## 2020-04-06 ENCOUNTER — DOCUMENTATION ONLY (OUTPATIENT)
Dept: NEUROLOGY | Age: 65
End: 2020-04-06

## 2020-04-06 RX ORDER — LEVOTHYROXINE SODIUM 25 UG/1
25 TABLET ORAL
Qty: 90 TAB | Refills: 0 | Status: SHIPPED | OUTPATIENT
Start: 2020-04-06 | End: 2020-07-07 | Stop reason: SDUPTHER

## 2020-04-06 NOTE — TELEPHONE ENCOUNTER
Willy is requesting a refill on  Pt's med    Requested Prescriptions     Pending Prescriptions Disp Refills    levothyroxine (SYNTHROID) 25 mcg tablet 90 Tab 0     Sig: Take 1 Tab by mouth Daily (before breakfast).

## 2020-04-10 ENCOUNTER — TELEPHONE (OUTPATIENT)
Dept: NEUROLOGY | Age: 65
End: 2020-04-10

## 2020-04-27 ENCOUNTER — TELEPHONE (OUTPATIENT)
Dept: FAMILY MEDICINE CLINIC | Age: 65
End: 2020-04-27

## 2020-04-27 NOTE — TELEPHONE ENCOUNTER
----- Message from Erick Blas sent at 4/27/2020 12:36 PM EDT -----  Regarding: YAJAIRA [place]/General  Caller: Merlinda Govern    Reason: The caller said the patient had an appointment scheduled to come into the practice for May 7th, however there is no record of a cancellation nor appointment to be found. The caller would like a call back.      Best contact number(s): [phone]

## 2020-05-20 ENCOUNTER — TELEPHONE (OUTPATIENT)
Dept: FAMILY MEDICINE CLINIC | Age: 65
End: 2020-05-20

## 2020-05-20 NOTE — TELEPHONE ENCOUNTER
Is calling needing an order for mammogram states she was due in April but he needs this to be ordered because she has had breast cancer in the past would like for [facility] in [address]

## 2020-05-26 NOTE — TELEPHONE ENCOUNTER
notified and voiced understanding.  She has a appointment with them on Thursday so he will double check with him then

## 2020-05-26 NOTE — TELEPHONE ENCOUNTER
Patient is followed by her breast oncologist. Has he called him? Appears that she ordered it in November to be done at [address] [place] [address].

## 2020-06-04 LAB — MAMMOGRAPHY, EXTERNAL: NORMAL

## 2020-06-30 ENCOUNTER — TELEPHONE (OUTPATIENT)
Dept: FAMILY MEDICINE CLINIC | Age: 65
End: 2020-06-30

## 2020-06-30 NOTE — TELEPHONE ENCOUNTER
is concerned about pts strength he doesn't know if its the dementia. He states she is getting weaker all she does is sit and watch TV all day. He did get her a treadmill and she gets on that every other day. But he would like for someone to come in and do PT with her. I let him know that would require virtual video visit.  states they are unable to do a video and would rather come in to see you. He really thinks you need to see her in person so you can see the way walks and just check her out over all. I let know that you were on vacation this week and wouldn't be able to respond until next week and he states that's fine.

## 2020-06-30 NOTE — TELEPHONE ENCOUNTER
Message from [address] [facility] to be referred to PT    NP [facility] [place] / telephone   Received:  Today   Message Contents   Nicolle Chicas [facility] Front [facility]             General Message/Vendor Calls     Caller's first and last name:     Sonido Toth (SPOUSE)       Reason for call:   referral       Callback required yes/no and why:  yes to discuss physical therapy     This is the correct # Best contact number(s):   [phone]       Details to clarify the request:  patient is experiencing weakness in arms and  legs       Chante Chicas

## 2020-07-07 RX ORDER — LEVOTHYROXINE SODIUM 25 UG/1
25 TABLET ORAL
Qty: 90 TAB | Refills: 0 | Status: SHIPPED | OUTPATIENT
Start: 2020-07-07 | End: 2020-01-01 | Stop reason: SDUPTHER

## 2020-07-07 NOTE — TELEPHONE ENCOUNTER
Requested Prescriptions     Pending Prescriptions Disp Refills    levothyroxine (SYNTHROID) 25 mcg tablet 90 Tab 0     Sig: Take 1 Tab by mouth Daily (before breakfast).

## 2020-07-21 ENCOUNTER — OFFICE VISIT (OUTPATIENT)
Dept: FAMILY MEDICINE CLINIC | Age: 65
End: 2020-07-21

## 2020-07-21 VITALS
BODY MASS INDEX: 24.43 KG/M2 | SYSTOLIC BLOOD PRESSURE: 138 MMHG | RESPIRATION RATE: 14 BRPM | HEIGHT: 66 IN | TEMPERATURE: 98.6 F | DIASTOLIC BLOOD PRESSURE: 74 MMHG | WEIGHT: 152 LBS | HEART RATE: 58 BPM | OXYGEN SATURATION: 97 %

## 2020-07-21 DIAGNOSIS — E53.8 B12 DEFICIENCY: ICD-10-CM

## 2020-07-21 DIAGNOSIS — R53.1 WEAKNESS GENERALIZED: ICD-10-CM

## 2020-07-21 DIAGNOSIS — G30.0 EARLY ONSET ALZHEIMER'S DEMENTIA WITHOUT BEHAVIORAL DISTURBANCE (HCC): Primary | ICD-10-CM

## 2020-07-21 DIAGNOSIS — D33.3 ACOUSTIC NEUROMA (HCC): ICD-10-CM

## 2020-07-21 DIAGNOSIS — E55.9 VITAMIN D DEFICIENCY: ICD-10-CM

## 2020-07-21 DIAGNOSIS — E03.9 ACQUIRED HYPOTHYROIDISM: ICD-10-CM

## 2020-07-21 DIAGNOSIS — F02.80 EARLY ONSET ALZHEIMER'S DEMENTIA WITHOUT BEHAVIORAL DISTURBANCE (HCC): Primary | ICD-10-CM

## 2020-07-21 LAB
BILIRUB UR QL STRIP: NEGATIVE
GLUCOSE UR-MCNC: NEGATIVE MG/DL
KETONES P FAST UR STRIP-MCNC: NEGATIVE MG/DL
PH UR STRIP: 7 [PH] (ref 4.6–8)
PROT UR QL STRIP: NEGATIVE
SP GR UR STRIP: 1.01 (ref 1–1.03)
UA UROBILINOGEN AMB POC: NORMAL (ref 0.2–1)
URINALYSIS CLARITY POC: CLEAR
URINALYSIS COLOR POC: YELLOW
URINE BLOOD POC: NEGATIVE
URINE LEUKOCYTES POC: NORMAL
URINE NITRITES POC: NEGATIVE

## 2020-07-21 NOTE — PROGRESS NOTES
Acosta Boyd is a 60 y.o. female  Chief Complaint   Patient presents with    Dementia    Referral Request     for  physical therapy at home     Health Maintenance Due   Topic Date Due    Shingrix Vaccine Age 49> (1 of 2) 12/03/2007    Breast Cancer Screen Mammogram  04/23/2020    PAP AKA CERVICAL CYTOLOGY  08/09/2020      Mammo done at [facility] in June 4, 2020. Visit Vitals  Ht 5' 6\" (1.676 m)   BMI 24.18 kg/m²     1. Have you been to the ER, urgent care clinic since your last visit? Hospitalized since your last visit? no    2. Have you seen or consulted any other health care providers outside of the [address] [place] [address] since your last visit? Include any pap smears or colon screening.  No  .Pepper Ceja

## 2020-07-21 NOTE — PROGRESS NOTES
Subjective:     Chief Complaint   Patient presents with    Dementia    Referral Request     for  physical therapy at home        HPI:  60 y.o.  presents for follow up appointment. Patient's  concerned that she is getting weaker and would like PT at home.  says he bought her a treadmill and she is walking on that few times per week, balance doing ok. He is concerned that her arms are weak and has shakes in her hands sometimes when she is trying to eat or drink something. She has no complaints. He says that she appears to have a hard time getting into their bed sometimes, like she does not have the upper body strength to climb into the bed. He says that when she had PT in the past they really helped her build her strength and helped them learn exercises to do to maintain strength  Recent diagnosis of Acoustic Neuroma, seen by Neurosurgery Dr Chaz Montoya who did not want to operate so was referred to [address] [place] [address] and  received radiation therapy x 1 dose per . Saw same rad/oncology at [facility] ([facility]) in April, has follow up in September and will get another scan done on her head then.  says that doctor said she was doing fine and he was happy with the outcome so far. No falls. Eating and drinking normally. No complaints per patient. No hospital, ER or specialist visits since last primary care visit except as noted above.     Past Medical History:   Diagnosis Date    Breast cancer ([facility] [address] 2008    Left    Hyperlipidemia 6/30/2017    Memory disorder     Radiation therapy complication     Lt breast 2008--no chemo       Social History     Tobacco Use    Smoking status: Never Smoker    Smokeless tobacco: Never Used   Substance Use Topics    Alcohol use: No    Drug use: No       Outpatient Medications Marked as Taking for the 7/21/20 encounter (Office Visit) with Lidia [place] NP   Medication Sig Dispense Refill    levothyroxine (SYNTHROID) 25 mcg tablet Take 1 Tab by mouth Daily (before breakfast). 90 Tab 0    calcium-cholecalciferol, D3, (CALTRATE 600+D) tablet Take 1 Tab by mouth daily.  diphenhydrAMINE (BENADRYL) 25 mg tablet Take 25 mg by mouth every six (6) hours as needed for Sleep.  pravastatin (PRAVACHOL) 10 mg tablet take 1 tablet by mouth at bedtime 30 Tab 6    omega 3-dha-epa-fish oil (FISH OIL) 100-160-1,000 mg cap Take  by mouth.  donepezil (ARICEPT) 10 mg tablet Take 1 Tab by mouth nightly. 30 Tab 11    memantine (NAMENDA) 10 mg tablet take 1 tablet by mouth twice a day 60 Tab 11       Allergies   Allergen Reactions    Pcn [Penicillins] Hives       Health Maintenance reviewed       ROS:  Gen: +fatigue, no fever, no chills, no unexplained weight loss or weight gain  Eyes: no excessive tearing, itching, or discharge  Nose: no rhinorrhea, no sinus pain  Mouth: no oral lesions, no sore throat, no difficulty swallowing  Resp: no shortness of breath, no wheezing, no cough  CV: no chest pain, no orthopnea, no paroxysmal nocturnal dyspnea, no lower extremity edema, no palpitations  Abd: no nausea, no heartburn, no diarrhea, no constipation, no abdominal pain  Neuro: no headaches, no syncope or presyncopal episodes  Endo: no polyuria, no polydipsia.     : no hematuria, no dysuria, no frequency, no incontinence  Heme: no lymphadenopathy, no easy bruising or bleeding, no night sweats  MSK: no joint pain or swelling    Results for orders placed or performed in visit on 07/21/20   AMB POC URINALYSIS DIP STICK AUTO W/O MICRO   Result Value Ref Range    Color (UA POC) Yellow     Clarity (UA POC) Clear     Glucose (UA POC) Negative Negative    Bilirubin (UA POC) Negative Negative    Ketones (UA POC) Negative Negative    Specific gravity (UA POC) 1.015 1.001 - 1.035    Blood (UA POC) Negative Negative    pH (UA POC) 7 4.6 - 8.0    Protein (UA POC) Negative Negative    Urobilinogen (UA POC) 0.2 mg/dL 0.2 - 1    Nitrites (UA POC) Negative Negative    Leukocyte esterase (UA POC) Trace Negative           PE:  Visit Vitals  /74 (BP 1 Location: Left arm, BP Patient Position: Sitting)   Pulse (!) 58   Temp 98.6 °F (37 °C) (Oral)   Resp 14   Ht 5' 6\" (1.676 m)   Wt 152 lb (68.9 kg)   LMP  (LMP Unknown)   SpO2 97%   BMI 24.53 kg/m²     Gen: alert, oriented to self only (baseline), no acute distress. Does not talk much, answers simple yes/no questions. Head: normocephalic, atraumatic  Eyes: pupils equal round reactive to light, sclera clear, conjunctiva clear  Oral: moist mucus membranes, no oral lesions, no pharyngeal inflammation or exudate  Neck: symmetric normal sized thyroid, no carotid bruits, no jugular vein distention  Resp: no increase work of breathing, lungs clear to ausculation bilaterally, no wheezing, rales or rhonchi  CV: S1, S2 normal.  No murmurs, rubs, or gallops. Abd: soft, not tender, not distended. No hepatosplenomegaly. Normal bowel sounds. No hernias. No abdominal or renal bruits. Neuro:normal strength and movement in all extremities, and sensation intact and symmetric. Skin: no lesion or rash  Extremities: no cyanosis or edema    No results found for this visit on 07/21/20. Assessment/Plan:  Differential diagnosis and treatment options reviewed with patient who is in agreement with treatment plan as outlined below. ICD-10-CM ICD-9-CM    1. Early onset Alzheimer's dementia without behavioral disturbance (HCC)  P52.9 010.6 METABOLIC PANEL, COMPREHENSIVE    F02.80 294.10 CBC WITH AUTOMATED DIFF      IA HANDLG&/OR CONVEY OF SPEC FOR TR OFFICE TO LAB      COLLECTION VENOUS BLOOD,VENIPUNCTURE      OTHER   2. Acoustic neuroma (HCC)  D33.3 225.1 CBC WITH AUTOMATED DIFF      OTHER   3. Weakness generalized  K33.2 994.51 METABOLIC PANEL, COMPREHENSIVE      CBC WITH AUTOMATED DIFF      OTHER   4. Acquired hypothyroidism  E03.9 244.9 TSH 3RD GENERATION      T4, FREE   5. Vitamin D deficiency  E55.9 268.9 VITAMIN D, 25 HYDROXY   6.  B12 deficiency E53.8 266.2 VITAMIN B12     Appears stable today, will get labs today. May need to see neurology sooner if she declines but discussed with  that she may have good days and she may have bad days. Weakness may be a progression of her chronic disease and/or related to her recent radiation/ acoustic neuroma. Has upcoming MRI, not scheduled yet. He will call her specialist at [facility] for appointment. Will order home health to evaluate and treat, may benefit from PT/OT. Will call after labs result. Discussed BMI and healthy weight. Encouraged patient to work to implement changes including diet high in raw fruits and vegetables, lean protein and good fats. Limit refined, processed carbohydrates and sugar. Encouraged regular exercise. I have discussed the diagnosis with the patient and the intended plan as seen in the above orders. The patient has received an after-visit summary and questions were answered concerning future plans. I have discussed medication side effects and warnings with the patient as well. The patient verbalizes understanding and agreement with the plan.

## 2020-07-22 LAB
25(OH)D3+25(OH)D2 SERPL-MCNC: 43.1 NG/ML (ref 30–100)
ALBUMIN SERPL-MCNC: 5.3 G/DL (ref 3.8–4.8)
ALBUMIN/GLOB SERPL: 2.2 {RATIO} (ref 1.2–2.2)
ALP SERPL-CCNC: 40 IU/L (ref 39–117)
ALT SERPL-CCNC: 13 IU/L (ref 0–32)
AST SERPL-CCNC: 17 IU/L (ref 0–40)
BASOPHILS # BLD AUTO: 0.1 X10E3/UL (ref 0–0.2)
BASOPHILS NFR BLD AUTO: 1 %
BILIRUB SERPL-MCNC: 0.3 MG/DL (ref 0–1.2)
BUN SERPL-MCNC: 13 MG/DL (ref 8–27)
BUN/CREAT SERPL: 14 (ref 12–28)
CALCIUM SERPL-MCNC: 10.4 MG/DL (ref 8.7–10.3)
CHLORIDE SERPL-SCNC: 100 MMOL/L (ref 96–106)
CO2 SERPL-SCNC: 24 MMOL/L (ref 20–29)
CREAT SERPL-MCNC: 0.93 MG/DL (ref 0.57–1)
EOSINOPHIL # BLD AUTO: 0.1 X10E3/UL (ref 0–0.4)
EOSINOPHIL NFR BLD AUTO: 2 %
ERYTHROCYTE [DISTWIDTH] IN BLOOD BY AUTOMATED COUNT: 12.4 % (ref 11.7–15.4)
GLOBULIN SER CALC-MCNC: 2.4 G/DL (ref 1.5–4.5)
GLUCOSE SERPL-MCNC: 95 MG/DL (ref 65–99)
HCT VFR BLD AUTO: 42 % (ref 34–46.6)
HGB BLD-MCNC: 13.2 G/DL (ref 11.1–15.9)
IMM GRANULOCYTES # BLD AUTO: 0 X10E3/UL (ref 0–0.1)
IMM GRANULOCYTES NFR BLD AUTO: 0 %
LYMPHOCYTES # BLD AUTO: 1.4 X10E3/UL (ref 0.7–3.1)
LYMPHOCYTES NFR BLD AUTO: 30 %
MCH RBC QN AUTO: 27.8 PG (ref 26.6–33)
MCHC RBC AUTO-ENTMCNC: 31.4 G/DL (ref 31.5–35.7)
MCV RBC AUTO: 89 FL (ref 79–97)
MONOCYTES # BLD AUTO: 0.3 X10E3/UL (ref 0.1–0.9)
MONOCYTES NFR BLD AUTO: 7 %
NEUTROPHILS # BLD AUTO: 2.7 X10E3/UL (ref 1.4–7)
NEUTROPHILS NFR BLD AUTO: 60 %
PLATELET # BLD AUTO: 283 X10E3/UL (ref 150–450)
POTASSIUM SERPL-SCNC: 4.4 MMOL/L (ref 3.5–5.2)
PROT SERPL-MCNC: 7.7 G/DL (ref 6–8.5)
RBC # BLD AUTO: 4.74 X10E6/UL (ref 3.77–5.28)
SODIUM SERPL-SCNC: 141 MMOL/L (ref 134–144)
T4 FREE SERPL-MCNC: 1.18 NG/DL (ref 0.82–1.77)
TSH SERPL DL<=0.005 MIU/L-ACNC: 3.96 UIU/ML (ref 0.45–4.5)
VIT B12 SERPL-MCNC: 1561 PG/ML (ref 232–1245)
WBC # BLD AUTO: 4.6 X10E3/UL (ref 3.4–10.8)

## 2020-07-27 NOTE — PROGRESS NOTES
b12 is a little high, have  cut dose in half or give only every other day. Her calcium is a slightly elevated. Should let her oncologist have copy of these labs. Please mail  copy of these labs to take to him or we can fax copy (her [address] [place] [address] oncologist for treating her tumor).  
All other labs look great

## 2020-07-28 NOTE — PROGRESS NOTES
verified by patient. Informed spouse per Floyd Delacruz NP results and recommendations. Spouse vocalized understanding.   Results mailed to the address on file

## 2020-08-18 DIAGNOSIS — E78.5 HYPERLIPIDEMIA, UNSPECIFIED HYPERLIPIDEMIA TYPE: ICD-10-CM

## 2020-08-18 RX ORDER — PRAVASTATIN SODIUM 10 MG/1
TABLET ORAL
Qty: 30 TAB | Refills: 6 | Status: SHIPPED | OUTPATIENT
Start: 2020-08-18 | End: 2021-01-01 | Stop reason: SDUPTHER

## 2020-08-18 NOTE — TELEPHONE ENCOUNTER
Requested Prescriptions     Pending Prescriptions Disp Refills    pravastatin (PRAVACHOL) 10 mg tablet 30 Tab 6

## 2020-08-27 ENCOUNTER — HOSPITAL ENCOUNTER (OUTPATIENT)
Dept: MRI IMAGING | Age: 65
Discharge: HOME | End: 2020-08-27
Attending: NEUROLOGICAL SURGERY
Payer: MEDICARE

## 2020-08-27 DIAGNOSIS — D33.3 ACOUSTIC NEUROMA (HCC): ICD-10-CM

## 2020-08-27 PROCEDURE — 70553 MRI BRAIN STEM W/O & W/DYE: CPT

## 2020-08-27 PROCEDURE — 74011250636 HC RX REV CODE- 250/636: Performed by: NEUROLOGICAL SURGERY

## 2020-08-27 PROCEDURE — A9575 INJ GADOTERATE MEGLUMI 0.1ML: HCPCS | Performed by: NEUROLOGICAL SURGERY

## 2020-08-27 RX ORDER — GADOTERATE MEGLUMINE 376.9 MG/ML
12 INJECTION INTRAVENOUS
Status: COMPLETED | OUTPATIENT
Start: 2020-08-27 | End: 2020-08-27

## 2020-08-27 RX ADMIN — GADOTERATE MEGLUMINE 12 ML: 376.9 INJECTION INTRAVENOUS at 17:00

## 2020-09-21 ENCOUNTER — HOSPITAL ENCOUNTER (OUTPATIENT)
Dept: PREADMISSION TESTING | Age: 65
Discharge: HOME | End: 2020-09-21
Payer: MEDICARE

## 2020-09-21 VITALS
TEMPERATURE: 98.2 F | BODY MASS INDEX: 24.48 KG/M2 | RESPIRATION RATE: 16 BRPM | SYSTOLIC BLOOD PRESSURE: 122 MMHG | HEART RATE: 81 BPM | WEIGHT: 152.34 LBS | HEIGHT: 66 IN | DIASTOLIC BLOOD PRESSURE: 83 MMHG

## 2020-09-21 LAB
BASOPHILS # BLD: 0 K/UL (ref 0–0.1)
BASOPHILS NFR BLD: 1 % (ref 0–1)
DIFFERENTIAL METHOD BLD: NORMAL
EOSINOPHIL # BLD: 0.1 K/UL (ref 0–0.4)
EOSINOPHIL NFR BLD: 1 % (ref 0–7)
ERYTHROCYTE [DISTWIDTH] IN BLOOD BY AUTOMATED COUNT: 12.1 % (ref 11.5–14.5)
HCT VFR BLD AUTO: 39.5 % (ref 35–47)
HGB BLD-MCNC: 12.5 G/DL (ref 11.5–16)
IMM GRANULOCYTES # BLD AUTO: 0 K/UL (ref 0–0.04)
IMM GRANULOCYTES NFR BLD AUTO: 0 % (ref 0–0.5)
LYMPHOCYTES # BLD: 1.2 K/UL (ref 0.8–3.5)
LYMPHOCYTES NFR BLD: 29 % (ref 12–49)
MCH RBC QN AUTO: 28.4 PG (ref 26–34)
MCHC RBC AUTO-ENTMCNC: 31.6 G/DL (ref 30–36.5)
MCV RBC AUTO: 89.8 FL (ref 80–99)
MONOCYTES # BLD: 0.3 K/UL (ref 0–1)
MONOCYTES NFR BLD: 8 % (ref 5–13)
NEUTS SEG # BLD: 2.5 K/UL (ref 1.8–8)
NEUTS SEG NFR BLD: 61 % (ref 32–75)
NRBC # BLD: 0 K/UL (ref 0–0.01)
NRBC BLD-RTO: 0 PER 100 WBC
PLATELET # BLD AUTO: 243 K/UL (ref 150–400)
PMV BLD AUTO: 10.3 FL (ref 8.9–12.9)
RBC # BLD AUTO: 4.4 M/UL (ref 3.8–5.2)
WBC # BLD AUTO: 4 K/UL (ref 3.6–11)

## 2020-09-21 PROCEDURE — 36415 COLL VENOUS BLD VENIPUNCTURE: CPT

## 2020-09-21 PROCEDURE — 85025 COMPLETE CBC W/AUTO DIFF WBC: CPT

## 2020-09-21 RX ORDER — DIPHENHYDRAMINE HCL 25 MG
50 TABLET ORAL
COMMUNITY

## 2020-09-21 NOTE — PERIOP NOTES
Patient given surgical site infection FAQs handout and hand hygiene tips sheet. Pre-operative instructions reviewed and patient verbalizes understanding of instructions. Patient has been given the opportunity to ask additional questions. Pt given 2 bottles of CHG soap and instructed in use. Pt instructed that they will be scheduled for COVID 19 test 4 days prior to surgery. COVID instruction sheet and instructions given to PT. Pt instructed they must self quarantine between  COVID 19 test and day of surgery. Parking deck instructions with phone number given to patient. Instructions reviewed with patient.

## 2020-09-21 NOTE — PERIOP NOTES
PATIENT'S , GREGG, CALLED AND MADE AWARE OF COVID-19 TESTING REQUIRED TO BE DONE WITHIN 96 HOURS OF SURGERY. COVID-19 TESTING APPOINTMENT MADE FOR PATIENT. PATIENT'S  INSTRUCTED ON SELF QUARANTINE BETWEEN TESTING AND ARRIVAL TIME DAY OF SURGERY. INSTRUCTED NOT TO USE NASAL SPRAY OR NASAL OINTMENTS DAY OF TESTING, PRIOR TO TEST. LOCATION OF TESTING DISCUSSED WITH PATIENT'S .

## 2020-09-27 ENCOUNTER — HOSPITAL ENCOUNTER (OUTPATIENT)
Dept: PREADMISSION TESTING | Age: 65
Discharge: HOME | End: 2020-09-27
Payer: MEDICARE

## 2020-09-27 DIAGNOSIS — Z01.812 PRE-PROCEDURE LAB EXAM: ICD-10-CM

## 2020-09-27 PROCEDURE — 87635 SARS-COV-2 COVID-19 AMP PRB: CPT

## 2020-09-28 LAB — SARS-COV-2, COV2NT: NOT DETECTED

## 2020-09-30 ENCOUNTER — ANESTHESIA EVENT (OUTPATIENT)
Dept: SURGERY | Age: 65
DRG: 033 | End: 2020-09-30
Payer: MEDICARE

## 2020-10-01 ENCOUNTER — ANESTHESIA (OUTPATIENT)
Dept: SURGERY | Age: 65
DRG: 033 | End: 2020-10-01
Payer: MEDICARE

## 2020-10-01 ENCOUNTER — HOSPITAL ENCOUNTER (INPATIENT)
Age: 65
LOS: 5 days | Discharge: HOME HEALTH | DRG: 033 | End: 2020-10-06
Attending: NEUROLOGICAL SURGERY | Admitting: NEUROLOGICAL SURGERY
Payer: MEDICARE

## 2020-10-01 DIAGNOSIS — Z98.2 S/P VP SHUNT: Primary | ICD-10-CM

## 2020-10-01 PROBLEM — G91.9 HYDROCEPHALUS (HCC): Status: ACTIVE | Noted: 2020-10-01

## 2020-10-01 PROBLEM — G91.2 NPH (NORMAL PRESSURE HYDROCEPHALUS) (HCC): Status: ACTIVE | Noted: 2020-10-01

## 2020-10-01 LAB
APPEARANCE CSF: CLEAR
COLOR CSF: ABNORMAL
COLOR SPUN CSF: COLORLESS
GLUCOSE CSF-MCNC: 59 MG/DL (ref 40–70)
PROT CSF-MCNC: 59 MG/DL (ref 15–45)
RBC # CSF: 425 /CU MM
TUBE # CSF: 1
WBC # CSF: 0 /CU MM (ref 0–5)

## 2020-10-01 PROCEDURE — 74011250637 HC RX REV CODE- 250/637: Performed by: ANESTHESIOLOGY

## 2020-10-01 PROCEDURE — 76210000017 HC OR PH I REC 1.5 TO 2 HR: Performed by: NEUROLOGICAL SURGERY

## 2020-10-01 PROCEDURE — 84157 ASSAY OF PROTEIN OTHER: CPT

## 2020-10-01 PROCEDURE — 77030004831 HC CATH CSF PERI MEDT -C: Performed by: NEUROLOGICAL SURGERY

## 2020-10-01 PROCEDURE — 74011250636 HC RX REV CODE- 250/636: Performed by: NURSE ANESTHETIST, CERTIFIED REGISTERED

## 2020-10-01 PROCEDURE — 76060000035 HC ANESTHESIA 2 TO 2.5 HR: Performed by: NEUROLOGICAL SURGERY

## 2020-10-01 PROCEDURE — 0WJG4ZZ INSPECTION OF PERITONEAL CAVITY, PERCUTANEOUS ENDOSCOPIC APPROACH: ICD-10-PCS | Performed by: SURGERY

## 2020-10-01 PROCEDURE — 77030038600 HC TU BPLR IRR DISP STRY -B: Performed by: NEUROLOGICAL SURGERY

## 2020-10-01 PROCEDURE — 77030008684 HC TU ET CUF COVD -B: Performed by: NURSE ANESTHETIST, CERTIFIED REGISTERED

## 2020-10-01 PROCEDURE — 77030034443 HC VLV CSF STRTA MEDT -I3: Performed by: NEUROLOGICAL SURGERY

## 2020-10-01 PROCEDURE — 76010000131 HC OR TIME 2 TO 2.5 HR: Performed by: NEUROLOGICAL SURGERY

## 2020-10-01 PROCEDURE — 74011250636 HC RX REV CODE- 250/636: Performed by: NEUROLOGICAL SURGERY

## 2020-10-01 PROCEDURE — 77030004833 HC CATH CSF VENT MEDT -C: Performed by: NEUROLOGICAL SURGERY

## 2020-10-01 PROCEDURE — 77030026438 HC STYL ET INTUB CARD -A: Performed by: NURSE ANESTHETIST, CERTIFIED REGISTERED

## 2020-10-01 PROCEDURE — 77030002946 HC SUT NRLN J&J -B: Performed by: NEUROLOGICAL SURGERY

## 2020-10-01 PROCEDURE — 82945 GLUCOSE OTHER FLUID: CPT

## 2020-10-01 PROCEDURE — 74011000250 HC RX REV CODE- 250: Performed by: NURSE ANESTHETIST, CERTIFIED REGISTERED

## 2020-10-01 PROCEDURE — 77030014650 HC SEAL MTRX FLOSEL BAXT -C: Performed by: NEUROLOGICAL SURGERY

## 2020-10-01 PROCEDURE — 74011250636 HC RX REV CODE- 250/636: Performed by: ANESTHESIOLOGY

## 2020-10-01 PROCEDURE — 74011000250 HC RX REV CODE- 250: Performed by: SURGERY

## 2020-10-01 PROCEDURE — 89050 BODY FLUID CELL COUNT: CPT

## 2020-10-01 PROCEDURE — 2709999900 HC NON-CHARGEABLE SUPPLY: Performed by: NEUROLOGICAL SURGERY

## 2020-10-01 PROCEDURE — 65660000000 HC RM CCU STEPDOWN

## 2020-10-01 PROCEDURE — 77030010507 HC ADH SKN DERMBND J&J -B: Performed by: NEUROLOGICAL SURGERY

## 2020-10-01 PROCEDURE — 77030018673: Performed by: NEUROLOGICAL SURGERY

## 2020-10-01 PROCEDURE — 77030008462 HC STPLR SKN PROX J&J -A: Performed by: NEUROLOGICAL SURGERY

## 2020-10-01 PROCEDURE — 74011000250 HC RX REV CODE- 250: Performed by: NEUROLOGICAL SURGERY

## 2020-10-01 PROCEDURE — 62223 ESTABLISH BRAIN CAVITY SHUNT: CPT | Performed by: SURGERY

## 2020-10-01 PROCEDURE — 77030012270 HC CATH PASS SUBQ MEDT -C: Performed by: NEUROLOGICAL SURGERY

## 2020-10-01 PROCEDURE — 77030002996 HC SUT SLK J&J -A: Performed by: NEUROLOGICAL SURGERY

## 2020-10-01 PROCEDURE — 77030031139 HC SUT VCRL2 J&J -A: Performed by: NEUROLOGICAL SURGERY

## 2020-10-01 PROCEDURE — 77030029099 HC BN WAX SSPC -A: Performed by: NEUROLOGICAL SURGERY

## 2020-10-01 PROCEDURE — 77030003029 HC SUT VCRL J&J -B: Performed by: NEUROLOGICAL SURGERY

## 2020-10-01 PROCEDURE — 00163J6 BYPASS CEREBRAL VENTRICLE TO PERITONEAL CAVITY WITH SYNTHETIC SUBSTITUTE, PERCUTANEOUS APPROACH: ICD-10-PCS | Performed by: NEUROLOGICAL SURGERY

## 2020-10-01 RX ORDER — FENTANYL CITRATE 50 UG/ML
INJECTION, SOLUTION INTRAMUSCULAR; INTRAVENOUS AS NEEDED
Status: DISCONTINUED | OUTPATIENT
Start: 2020-10-01 | End: 2020-10-01 | Stop reason: HOSPADM

## 2020-10-01 RX ORDER — OXYCODONE HYDROCHLORIDE 5 MG/1
5 TABLET ORAL AS NEEDED
Status: DISCONTINUED | OUTPATIENT
Start: 2020-10-01 | End: 2020-10-01 | Stop reason: HOSPADM

## 2020-10-01 RX ORDER — SODIUM CHLORIDE, SODIUM LACTATE, POTASSIUM CHLORIDE, CALCIUM CHLORIDE 600; 310; 30; 20 MG/100ML; MG/100ML; MG/100ML; MG/100ML
125 INJECTION, SOLUTION INTRAVENOUS CONTINUOUS
Status: DISCONTINUED | OUTPATIENT
Start: 2020-10-01 | End: 2020-10-01 | Stop reason: HOSPADM

## 2020-10-01 RX ORDER — DEXAMETHASONE SODIUM PHOSPHATE 4 MG/ML
INJECTION, SOLUTION INTRA-ARTICULAR; INTRALESIONAL; INTRAMUSCULAR; INTRAVENOUS; SOFT TISSUE AS NEEDED
Status: DISCONTINUED | OUTPATIENT
Start: 2020-10-01 | End: 2020-10-01 | Stop reason: HOSPADM

## 2020-10-01 RX ORDER — BUPIVACAINE HYDROCHLORIDE AND EPINEPHRINE 5; 5 MG/ML; UG/ML
INJECTION, SOLUTION EPIDURAL; INTRACAUDAL; PERINEURAL AS NEEDED
Status: DISCONTINUED | OUTPATIENT
Start: 2020-10-01 | End: 2020-10-01 | Stop reason: HOSPADM

## 2020-10-01 RX ORDER — SODIUM CHLORIDE 0.9 % (FLUSH) 0.9 %
5-40 SYRINGE (ML) INJECTION AS NEEDED
Status: DISCONTINUED | OUTPATIENT
Start: 2020-10-01 | End: 2020-10-01 | Stop reason: HOSPADM

## 2020-10-01 RX ORDER — SODIUM CHLORIDE 0.9 % (FLUSH) 0.9 %
5-40 SYRINGE (ML) INJECTION EVERY 8 HOURS
Status: DISCONTINUED | OUTPATIENT
Start: 2020-10-01 | End: 2020-10-01 | Stop reason: HOSPADM

## 2020-10-01 RX ORDER — ONDANSETRON 2 MG/ML
4 INJECTION INTRAMUSCULAR; INTRAVENOUS
Status: DISCONTINUED | OUTPATIENT
Start: 2020-10-01 | End: 2020-01-01 | Stop reason: HOSPADM

## 2020-10-01 RX ORDER — DIPHENHYDRAMINE HYDROCHLORIDE 50 MG/ML
12.5 INJECTION, SOLUTION INTRAMUSCULAR; INTRAVENOUS AS NEEDED
Status: DISCONTINUED | OUTPATIENT
Start: 2020-10-01 | End: 2020-10-01 | Stop reason: HOSPADM

## 2020-10-01 RX ORDER — MIDAZOLAM HYDROCHLORIDE 1 MG/ML
1 INJECTION, SOLUTION INTRAMUSCULAR; INTRAVENOUS AS NEEDED
Status: DISCONTINUED | OUTPATIENT
Start: 2020-10-01 | End: 2020-10-01 | Stop reason: HOSPADM

## 2020-10-01 RX ORDER — ROCURONIUM BROMIDE 10 MG/ML
INJECTION, SOLUTION INTRAVENOUS AS NEEDED
Status: DISCONTINUED | OUTPATIENT
Start: 2020-10-01 | End: 2020-10-01 | Stop reason: HOSPADM

## 2020-10-01 RX ORDER — SODIUM CHLORIDE, SODIUM LACTATE, POTASSIUM CHLORIDE, CALCIUM CHLORIDE 600; 310; 30; 20 MG/100ML; MG/100ML; MG/100ML; MG/100ML
INJECTION, SOLUTION INTRAVENOUS
Status: DISCONTINUED | OUTPATIENT
Start: 2020-10-01 | End: 2020-10-01 | Stop reason: HOSPADM

## 2020-10-01 RX ORDER — LIDOCAINE HYDROCHLORIDE 10 MG/ML
0.5 INJECTION, SOLUTION EPIDURAL; INFILTRATION; INTRACAUDAL; PERINEURAL AS NEEDED
Status: DISCONTINUED | OUTPATIENT
Start: 2020-10-01 | End: 2020-10-01 | Stop reason: HOSPADM

## 2020-10-01 RX ORDER — ACETAMINOPHEN 325 MG/1
650 TABLET ORAL ONCE
Status: COMPLETED | OUTPATIENT
Start: 2020-10-01 | End: 2020-10-01

## 2020-10-01 RX ORDER — MORPHINE SULFATE 10 MG/ML
2 INJECTION, SOLUTION INTRAMUSCULAR; INTRAVENOUS
Status: DISCONTINUED | OUTPATIENT
Start: 2020-10-01 | End: 2020-10-01 | Stop reason: HOSPADM

## 2020-10-01 RX ORDER — ONDANSETRON 2 MG/ML
4 INJECTION INTRAMUSCULAR; INTRAVENOUS AS NEEDED
Status: DISCONTINUED | OUTPATIENT
Start: 2020-10-01 | End: 2020-10-01 | Stop reason: HOSPADM

## 2020-10-01 RX ORDER — HYDROCODONE BITARTRATE AND ACETAMINOPHEN 5; 325 MG/1; MG/1
1 TABLET ORAL
Status: DISCONTINUED | OUTPATIENT
Start: 2020-10-01 | End: 2020-01-01 | Stop reason: HOSPADM

## 2020-10-01 RX ORDER — FENTANYL CITRATE 50 UG/ML
25 INJECTION, SOLUTION INTRAMUSCULAR; INTRAVENOUS
Status: DISCONTINUED | OUTPATIENT
Start: 2020-10-01 | End: 2020-10-01 | Stop reason: HOSPADM

## 2020-10-01 RX ORDER — DEXTROSE, SODIUM CHLORIDE, SODIUM LACTATE, POTASSIUM CHLORIDE, AND CALCIUM CHLORIDE 5; .6; .31; .03; .02 G/100ML; G/100ML; G/100ML; G/100ML; G/100ML
125 INJECTION, SOLUTION INTRAVENOUS CONTINUOUS
Status: DISCONTINUED | OUTPATIENT
Start: 2020-10-01 | End: 2020-10-01 | Stop reason: HOSPADM

## 2020-10-01 RX ORDER — LIDOCAINE HYDROCHLORIDE 20 MG/ML
INJECTION, SOLUTION EPIDURAL; INFILTRATION; INTRACAUDAL; PERINEURAL AS NEEDED
Status: DISCONTINUED | OUTPATIENT
Start: 2020-10-01 | End: 2020-10-01 | Stop reason: HOSPADM

## 2020-10-01 RX ORDER — SODIUM CHLORIDE 0.9 % (FLUSH) 0.9 %
5-40 SYRINGE (ML) INJECTION AS NEEDED
Status: DISCONTINUED | OUTPATIENT
Start: 2020-10-01 | End: 2020-01-01 | Stop reason: HOSPADM

## 2020-10-01 RX ORDER — MORPHINE SULFATE 2 MG/ML
2 INJECTION, SOLUTION INTRAMUSCULAR; INTRAVENOUS
Status: DISCONTINUED | OUTPATIENT
Start: 2020-10-01 | End: 2020-01-01 | Stop reason: HOSPADM

## 2020-10-01 RX ORDER — FENTANYL CITRATE 50 UG/ML
50 INJECTION, SOLUTION INTRAMUSCULAR; INTRAVENOUS AS NEEDED
Status: DISCONTINUED | OUTPATIENT
Start: 2020-10-01 | End: 2020-10-01 | Stop reason: HOSPADM

## 2020-10-01 RX ORDER — ONDANSETRON 2 MG/ML
INJECTION INTRAMUSCULAR; INTRAVENOUS AS NEEDED
Status: DISCONTINUED | OUTPATIENT
Start: 2020-10-01 | End: 2020-10-01 | Stop reason: HOSPADM

## 2020-10-01 RX ORDER — PROPOFOL 10 MG/ML
INJECTION, EMULSION INTRAVENOUS AS NEEDED
Status: DISCONTINUED | OUTPATIENT
Start: 2020-10-01 | End: 2020-10-01 | Stop reason: HOSPADM

## 2020-10-01 RX ORDER — ACETAMINOPHEN 325 MG/1
650 TABLET ORAL
Status: DISCONTINUED | OUTPATIENT
Start: 2020-10-01 | End: 2020-01-01 | Stop reason: HOSPADM

## 2020-10-01 RX ORDER — MIDAZOLAM HYDROCHLORIDE 1 MG/ML
1 INJECTION, SOLUTION INTRAMUSCULAR; INTRAVENOUS
Status: DISCONTINUED | OUTPATIENT
Start: 2020-10-01 | End: 2020-10-01 | Stop reason: HOSPADM

## 2020-10-01 RX ORDER — SODIUM CHLORIDE 0.9 % (FLUSH) 0.9 %
5-40 SYRINGE (ML) INJECTION EVERY 8 HOURS
Status: DISCONTINUED | OUTPATIENT
Start: 2020-10-01 | End: 2020-01-01 | Stop reason: HOSPADM

## 2020-10-01 RX ORDER — POTASSIUM CHLORIDE AND SODIUM CHLORIDE 450; 150 MG/100ML; MG/100ML
INJECTION, SOLUTION INTRAVENOUS CONTINUOUS
Status: DISCONTINUED | OUTPATIENT
Start: 2020-10-01 | End: 2020-10-02

## 2020-10-01 RX ORDER — ESMOLOL HYDROCHLORIDE 10 MG/ML
INJECTION INTRAVENOUS AS NEEDED
Status: DISCONTINUED | OUTPATIENT
Start: 2020-10-01 | End: 2020-10-01 | Stop reason: HOSPADM

## 2020-10-01 RX ADMIN — ONDANSETRON 4 MG: 2 INJECTION INTRAMUSCULAR; INTRAVENOUS at 15:58

## 2020-10-01 RX ADMIN — PROPOFOL 100 MG: 10 INJECTION, EMULSION INTRAVENOUS at 13:29

## 2020-10-01 RX ADMIN — PHENYLEPHRINE HYDROCHLORIDE 40 MCG/MIN: 10 INJECTION INTRAVENOUS at 14:06

## 2020-10-01 RX ADMIN — ESMOLOL HYDROCHLORIDE 20 MG: 10 INJECTION, SOLUTION INTRAVENOUS at 14:46

## 2020-10-01 RX ADMIN — PROPOFOL 50 MG: 10 INJECTION, EMULSION INTRAVENOUS at 13:31

## 2020-10-01 RX ADMIN — SODIUM CHLORIDE, SODIUM LACTATE, POTASSIUM CHLORIDE, AND CALCIUM CHLORIDE 125 ML/HR: 600; 310; 30; 20 INJECTION, SOLUTION INTRAVENOUS at 16:11

## 2020-10-01 RX ADMIN — SODIUM CHLORIDE, SODIUM LACTATE, POTASSIUM CHLORIDE, AND CALCIUM CHLORIDE 125 ML/HR: 600; 310; 30; 20 INJECTION, SOLUTION INTRAVENOUS at 12:22

## 2020-10-01 RX ADMIN — LIDOCAINE HYDROCHLORIDE 80 MG: 20 INJECTION, SOLUTION EPIDURAL; INFILTRATION; INTRACAUDAL; PERINEURAL at 13:29

## 2020-10-01 RX ADMIN — ONDANSETRON 4 MG: 2 INJECTION INTRAMUSCULAR; INTRAVENOUS at 20:28

## 2020-10-01 RX ADMIN — VANCOMYCIN HYDROCHLORIDE 1000 MG: 1 INJECTION, POWDER, LYOPHILIZED, FOR SOLUTION INTRAVENOUS at 13:17

## 2020-10-01 RX ADMIN — MEPERIDINE HYDROCHLORIDE 12.5 MG: 25 INJECTION INTRAMUSCULAR; INTRAVENOUS; SUBCUTANEOUS at 15:37

## 2020-10-01 RX ADMIN — ROCURONIUM BROMIDE 50 MG: 10 SOLUTION INTRAVENOUS at 13:32

## 2020-10-01 RX ADMIN — SUGAMMADEX 140 MG: 100 INJECTION, SOLUTION INTRAVENOUS at 15:15

## 2020-10-01 RX ADMIN — ROCURONIUM BROMIDE 20 MG: 10 SOLUTION INTRAVENOUS at 14:15

## 2020-10-01 RX ADMIN — ACETAMINOPHEN 650 MG: 325 TABLET ORAL at 12:29

## 2020-10-01 RX ADMIN — POTASSIUM CHLORIDE AND SODIUM CHLORIDE: 450; 150 INJECTION, SOLUTION INTRAVENOUS at 17:10

## 2020-10-01 RX ADMIN — PROPOFOL 50 MG: 10 INJECTION, EMULSION INTRAVENOUS at 14:17

## 2020-10-01 RX ADMIN — FENTANYL CITRATE 100 MCG: 50 INJECTION, SOLUTION INTRAMUSCULAR; INTRAVENOUS at 13:29

## 2020-10-01 RX ADMIN — Medication 10 ML: at 23:14

## 2020-10-01 RX ADMIN — ONDANSETRON HYDROCHLORIDE 4 MG: 2 INJECTION, SOLUTION INTRAMUSCULAR; INTRAVENOUS at 13:40

## 2020-10-01 RX ADMIN — DEXAMETHASONE SODIUM PHOSPHATE 4 MG: 4 INJECTION, SOLUTION INTRAMUSCULAR; INTRAVENOUS at 13:40

## 2020-10-01 RX ADMIN — SODIUM CHLORIDE, POTASSIUM CHLORIDE, SODIUM LACTATE AND CALCIUM CHLORIDE: 600; 310; 30; 20 INJECTION, SOLUTION INTRAVENOUS at 13:14

## 2020-10-01 NOTE — PERIOP NOTES
VS stable. Awake and alert. Resting comfortably. Patient denies nausea. No signs of excessive bleeding. Ready to transfer from PACU.

## 2020-10-01 NOTE — ROUTINE PROCESS
TRANSFER - IN REPORT: 
 
Verbal report received from Geni Min (name) on Milena Lugo  being received from PACU (unit) for routine post - op Report consisted of patients Situation, Background, Assessment and  
Recommendations(SBAR). Information from the following report(s) SBAR, Kardex, Procedure Summary, Intake/Output, MAR, Accordion and Dual Neuro Assessment was reviewed with the receiving nurse. Opportunity for questions and clarification was provided. Assessment completed upon patients arrival to unit and care assumed.

## 2020-10-01 NOTE — PERIOP NOTES
TRANSFER - OUT REPORT: 
 
Verbal report given to Karen(name) on Soo Vargas  being transferred to [facility](unit) for routine post - op Report consisted of patients Situation, Background, Assessment and  
Recommendations(SBAR). Time Pre op antibiotic given:1315 Anesthesia Stop time: 32 61 16 Information from the following report(s) SBAR, OR Summary, Intake/Output, MAR and Cardiac Rhythm SB. was reviewed with the receiving nurse. Opportunity for questions and clarification was provided. Is the patient on 02? NO Is the patient on a monitor? YES Is the nurse transporting with the patient? YES Surgical Waiting Area notified of patient's transfer from PACU? YES The following personal items collected during your admission accompanied patient upon transfer:  
Dental Appliance: Dental Appliances: None Vision:   
Hearing Aid:   
Jewelry:   
Clothing: Clothing: With patient Other Valuables:   
Valuables sent to safe:

## 2020-10-01 NOTE — PROGRESS NOTES
Problem: Falls - Risk of 
Goal: *Absence of Falls Description: Document Charlotte Zamudio Fall Risk and appropriate interventions in the flowsheet. 10/1/2020 1812 by Maryam Henry Outcome: Progressing Towards Goal 
Note: Fall Risk Interventions: 
Mobility Interventions: Assess mobility with egress test, OT consult for ADLs, Patient to call before getting OOB, PT Consult for mobility concerns, PT Consult for assist device competence, Strengthening exercises (ROM-active/passive) Mentation Interventions: Adequate sleep, hydration, pain control, Door open when patient unattended, Increase mobility, More frequent rounding, Room close to nurse's station, Update white board Medication Interventions: Assess postural VS orthostatic hypotension, Patient to call before getting OOB, Teach patient to arise slowly Elimination Interventions: Call light in reach, Patient to call for help with toileting needs, Stay With Me (per policy), Toileting schedule/hourly rounds 10/1/2020 1812 by Maryam Henry Note: Fall Risk Interventions: 
Mobility Interventions: Assess mobility with egress test, OT consult for ADLs, Patient to call before getting OOB, PT Consult for mobility concerns, PT Consult for assist device competence, Strengthening exercises (ROM-active/passive) Mentation Interventions: Adequate sleep, hydration, pain control, Door open when patient unattended, Increase mobility, More frequent rounding, Room close to nurse's station, Update white board Medication Interventions: Assess postural VS orthostatic hypotension, Patient to call before getting OOB, Teach patient to arise slowly Elimination Interventions: Call light in reach, Patient to call for help with toileting needs, Stay With Me (per policy), Toileting schedule/hourly rounds Problem: Pressure Injury - Risk of 
Goal: *Prevention of pressure injury Description: Document Hari Scale and appropriate interventions in the flowsheet. Outcome: Progressing Towards Goal 
Note: Pressure Injury Interventions: 
Sensory Interventions: Assess changes in LOC, Check visual cues for pain, Assess need for specialty bed, Float heels, Minimize linen layers Activity Interventions: Assess need for specialty bed, Pressure redistribution bed/mattress(bed type), PT/OT evaluation Mobility Interventions: Assess need for specialty bed, Pressure redistribution bed/mattress (bed type), Suspension boots, Float heels Nutrition Interventions: Discuss nutritional consult with provider Friction and Shear Interventions: Lift sheet, Minimize layers

## 2020-10-01 NOTE — ANESTHESIA POSTPROCEDURE EVALUATION
Post-Anesthesia Evaluation and Assessment Patient: Chencho Gibbons MRN: [id number]  SSN: [id number] YOB: 1955  Age: 60 y.o. Sex: female I have evaluated the patient and they are stable and ready for discharge from the PACU. Cardiovascular Function/Vital Signs Visit Vitals BP (!) 148/82 Pulse 64 Temp 36.1 °C (97 °F) Resp 11 Ht 5' 6\" (1.676 m) Wt 69.1 kg (152 lb 5.4 oz) SpO2 100% BMI 24.59 kg/m² Patient is status post General anesthesia for Procedure(s): VENTRICULAR-PERITONEAL SHUNT PLACEMENT (GEN SURG TO POST). Nausea/Vomiting: None Postoperative hydration reviewed and adequate. Pain: 
Pain Scale 1: Numeric (0 - 10) (10/01/20 1700) Pain Intensity 1: 0 (10/01/20 1700) Managed Neurological Status:  
Neuro (WDL): Exceptions to WDL (10/01/20 1700) Neuro Neurologic State: Alert; Appropriate for age (10/01/20 1700) Orientation Level: Oriented to person;Disoriented to place; Disoriented to situation;Disoriented to time (10/01/20 1700) Cognition: Follows commands;Decreased attention/concentration (10/01/20 1700) Speech: Clear;Delayed responses (10/01/20 1700) Assessment L Pupil: Brisk;Round (10/01/20 1700) Size L Pupil (mm): 2 (10/01/20 1700) Assessment R Pupil: Brisk;Round (10/01/20 1700) Size R Pupil (mm): 2 (10/01/20 1700) LUE Motor Response: Purposeful (10/01/20 1700) LLE Motor Response: Purposeful (10/01/20 1700) RUE Motor Response: Purposeful (10/01/20 1700) RLE Motor Response: Purposeful (10/01/20 1700) At baseline Mental Status, Level of Consciousness: Arousable Pulmonary Status:  
O2 Device: Room air (10/01/20 1700) Adequate oxygenation and airway patent Complications related to anesthesia: None Post-anesthesia assessment completed. No concerns Signed By: Hood Vaz MD   
 October 1, 2020 Procedure(s): VENTRICULAR-PERITONEAL SHUNT PLACEMENT (GEN SURG TO POST). general 
 
<BSHSIANPOST> INITIAL Post-op Vital signs:  
Vitals Value Taken Time /82 10/1/2020  5:00 PM  
Temp 36.1 °C (97 °F) 10/1/2020  5:00 PM  
Pulse 68 10/1/2020  5:07 PM  
Resp 11 10/1/2020  5:07 PM  
SpO2 100 % 10/1/2020  5:07 PM  
Vitals shown include unvalidated device data.

## 2020-10-02 ENCOUNTER — APPOINTMENT (OUTPATIENT)
Dept: CT IMAGING | Age: 65
DRG: 033 | End: 2020-10-02
Attending: NEUROLOGICAL SURGERY
Payer: MEDICARE

## 2020-10-02 LAB
GLUCOSE BLD STRIP.AUTO-MCNC: 100 MG/DL (ref 65–100)
GLUCOSE BLD STRIP.AUTO-MCNC: 121 MG/DL (ref 65–100)
SERVICE CMNT-IMP: ABNORMAL
SERVICE CMNT-IMP: NORMAL

## 2020-10-02 PROCEDURE — 74011250637 HC RX REV CODE- 250/637: Performed by: NEUROLOGICAL SURGERY

## 2020-10-02 PROCEDURE — 97162 PT EVAL MOD COMPLEX 30 MIN: CPT

## 2020-10-02 PROCEDURE — 74011250636 HC RX REV CODE- 250/636: Performed by: NEUROLOGICAL SURGERY

## 2020-10-02 PROCEDURE — 92610 EVALUATE SWALLOWING FUNCTION: CPT | Performed by: SPEECH-LANGUAGE PATHOLOGIST

## 2020-10-02 PROCEDURE — 97530 THERAPEUTIC ACTIVITIES: CPT

## 2020-10-02 PROCEDURE — 97165 OT EVAL LOW COMPLEX 30 MIN: CPT

## 2020-10-02 PROCEDURE — 92523 SPEECH SOUND LANG COMPREHEN: CPT | Performed by: SPEECH-LANGUAGE PATHOLOGIST

## 2020-10-02 PROCEDURE — 82962 GLUCOSE BLOOD TEST: CPT

## 2020-10-02 PROCEDURE — 70450 CT HEAD/BRAIN W/O DYE: CPT

## 2020-10-02 PROCEDURE — 65660000000 HC RM CCU STEPDOWN

## 2020-10-02 RX ORDER — DONEPEZIL HYDROCHLORIDE 10 MG/1
10 TABLET, FILM COATED ORAL
Status: DISCONTINUED | OUTPATIENT
Start: 2020-10-02 | End: 2020-01-01 | Stop reason: HOSPADM

## 2020-10-02 RX ORDER — LEVOTHYROXINE SODIUM 25 UG/1
25 TABLET ORAL
Status: DISCONTINUED | OUTPATIENT
Start: 2020-10-03 | End: 2020-01-01 | Stop reason: HOSPADM

## 2020-10-02 RX ORDER — PRAVASTATIN SODIUM 10 MG/1
10 TABLET ORAL
Status: DISCONTINUED | OUTPATIENT
Start: 2020-10-02 | End: 2020-01-01 | Stop reason: HOSPADM

## 2020-10-02 RX ORDER — MEMANTINE HYDROCHLORIDE 10 MG/1
10 TABLET ORAL DAILY
Status: DISCONTINUED | OUTPATIENT
Start: 2020-10-03 | End: 2020-01-01 | Stop reason: HOSPADM

## 2020-10-02 RX ADMIN — Medication 10 ML: at 22:00

## 2020-10-02 RX ADMIN — MORPHINE SULFATE 2 MG: 2 INJECTION, SOLUTION INTRAMUSCULAR; INTRAVENOUS at 08:39

## 2020-10-02 RX ADMIN — POTASSIUM CHLORIDE AND SODIUM CHLORIDE: 450; 150 INJECTION, SOLUTION INTRAVENOUS at 06:35

## 2020-10-02 RX ADMIN — Medication 10 ML: at 18:13

## 2020-10-02 RX ADMIN — ACETAMINOPHEN 650 MG: 325 TABLET ORAL at 18:12

## 2020-10-02 NOTE — PROGRESS NOTES
Bedside and Verbal shift change report given to Tenisha RN (oncoming nurse) by [facility], [facility] RN (offgoing nurse). Report included the following information SBAR, Kardex, Intake/Output, MAR, Recent Results, Cardiac Rhythm NSR and Dual Neuro Assessment.

## 2020-10-02 NOTE — PROGRESS NOTES
She is awake alert  Even a little brighter than pre-op. SELINA  Speech is still limited  There was good flow of spinal fluid at surgery even at distal end of peritoneal catheter but would like to have the ventricular catheter more optimum in location. Discussed with   Will watch over weekend and possibly replace catheter

## 2020-10-02 NOTE — PROGRESS NOTES
Transition of Care Plan:                   
Ongoing medical management; Neuro following. SLP to evaluate; Awaiting recommendations. Expected to discharge early next week pending medical stability. DISPOSITION--Return home when stable Tivis Goodpasture [phone] Confirmed demographics and insurance information: [employer] O. Lives with spouse in a private home. Independent with ADLs/IALDLs and no DME noted. Fills Rx at [facility] ([address]). No anticipated case management needs identified. Reason for Admission:   Admitted to [facility] for procedure: Diagnostic laparoscopy and positioning of the peritoneal portion of a ventriculoperitoneal shunt; Inpatient status 10/1 RUR Score:       6% Plan for utilizing home health:      SLP consulted; Awaiting recommendations PCP: First and Last name:  Fela Krishnan NP Name of Practice:  
 Are you a current patient: Yes/No: Yes Approximate date of last visit: Sept 2020 Can you participate in a virtual visit with your PCP: No 
                 
Current Advanced Directive/Advance Care Plan: Code status not on file; However, ACP-POA and ACP-AD on file. Care Management Interventions PCP Verified by CM: Yes 
Palliative Care Criteria Met (RRAT>21 & CHF Dx)?: No 
Mode of Transport at Discharge: Other (see comment) Transition of Care Consult (CM Consult): Discharge Planning Discharge Durable Medical Equipment: No 
Physical Therapy Consult: No 
Occupational Therapy Consult: No 
Speech Therapy Consult: Yes Current Support Network: Lives with Spouse Discharge Location Discharge Placement: Home with family assistance Medicare pt has received, reviewed, and signed IM letter informing them of their right to appeal the discharge. Signed copied has been placed on pt bedside chart. CM available in case needs arise. Emilio Felty, MSW,CRM

## 2020-10-02 NOTE — PROGRESS NOTES
Problem: Falls - Risk of 
Goal: *Absence of Falls Description: Document Astrid Cortes Fall Risk and appropriate interventions in the flowsheet. Outcome: Progressing Towards Goal 
Note: Fall Risk Interventions: 
Mobility Interventions: Patient to call before getting OOB, OT consult for ADLs, Communicate number of staff needed for ambulation/transfer, Bed/chair exit alarm Mentation Interventions: Bed/chair exit alarm, Adequate sleep, hydration, pain control, Reorient patient, More frequent rounding, Door open when patient unattended Medication Interventions: Bed/chair exit alarm, Evaluate medications/consider consulting pharmacy, Patient to call before getting OOB, Teach patient to arise slowly Elimination Interventions: Bed/chair exit alarm, Call light in reach, Patient to call for help with toileting needs Problem: Patient Education: Go to Patient Education Activity Goal: Patient/Family Education Outcome: Progressing Towards Goal 
  
Problem: Pressure Injury - Risk of 
Goal: *Prevention of pressure injury Description: Document Hari Scale and appropriate interventions in the flowsheet. Outcome: Progressing Towards Goal 
Note: Pressure Injury Interventions: 
Sensory Interventions: Assess changes in LOC, Keep linens dry and wrinkle-free, Check visual cues for pain, Discuss PT/OT consult with provider, Turn and reposition approx. every two hours (pillows and wedges if needed), Pressure redistribution bed/mattress (bed type), Float heels Activity Interventions: Assess need for specialty bed, Increase time out of bed, Pressure redistribution bed/mattress(bed type), PT/OT evaluation Mobility Interventions: HOB 30 degrees or less, Float heels, Turn and reposition approx. every two hours(pillow and wedges), Pressure redistribution bed/mattress (bed type), PT/OT evaluation Nutrition Interventions: Discuss nutritional consult with provider, Offer support with meals,snacks and hydration Friction and Shear Interventions: HOB 30 degrees or less, Feet elevated on foot rest, Minimize layers Problem: Patient Education: Go to Patient Education Activity Goal: Patient/Family Education Outcome: Progressing Towards Goal 
  
Problem: Pain Goal: *Control of Pain Outcome: Progressing Towards Goal

## 2020-10-02 NOTE — PROGRESS NOTES
Speech Therapy Goals Initiated 10/2/2020 1. Patient will tolerate regular  diet with thin liquids without signs/symptoms of aspiration given no cues within 7 day(s). 3.  Patient and family training In prep for discharge SPEECH LANGUAGE PATHOLOGY BEDSIDE SWALLOW AND SPEECH EVALUATIONS Patient: Carlita Mary (41 y.o. female) Date: 10/2/2020 Primary Diagnosis: Hydrocephalus ([address] [G91.9] NPH (normal pressure hydrocephalus) ([facility] [address] [G91.2] Procedure(s) (LRB): VENTRICULAR-PERITONEAL SHUNT PLACEMENT (GEN SURG TO POST) (N/A) 1 Day Post-Op Precautions:  
 
ASSESSMENT : 
Based on the objective data described below, the patient presents with decreased speech output which may be baseline, mild oral phase difficulty with swallow.  was not in room for eval but returned alter and I spoke with him before finishing this note. Patient seen after pills were found in pharynx during her intubation yesterday morning. Per , he suspects this was from Tylenol given prior to surgery, though I am uncertain of this timing. He denies any past difficulty with pills as evident to him or reported by patient. Patient with slow chewing, appeared to have pain or difficulty with chewing and endorsed this but could not given specifics. No difficulty with liquid or puree. No s/s of aspiration. Language was also evaluated this date with results below. No language errors noted but patient with difficulty following commands and no response to more difficulty questions. Spoke to  when he returned to room and while he has been with her very little since surgery (it was early in day), he reported speech at baseline. Patient will benefit from skilled intervention to address the above impairments. Patients rehabilitation potential is considered to be Good for resuming home diet PLAN : 
Recommendations and Planned Interventions: 1. Dysphagia 2 diet, thin liquids 2. Crush pills in puree for now, can advance towards whole pills. May need to \"elvira\" pills with extra liquid or solids to A with clearance. 3. Does not appear will need SLP follow up, expect can resume home diet and spouse reports no speech changes. I educated him about SLP avalibility if speech changes and to notify staff, here or in OP, if any major speech decline. Frequency/Duration: Patient will be followed by speech-language pathology 3 times a week to address goals for swallow Discharge Recommendations: likely none needed, see item #3 above. SUBJECTIVE:  
Patient stated that she likes to read. OBJECTIVE:  
 
Past Medical History:  
Diagnosis Date  Acoustic neuroma ([facility] [address]  Breast cancer ([facility] [address] 2008 Left  Breast cancer ([facility] [address] 07/2019 RIGHT  Communicating hydrocephalus ([facility] [address]  Hyperlipidemia 6/30/2017  Memory disorder  Radiation therapy complication Lt breast 2008--no chemo  Thyroid disease HYPOTHYROID Past Surgical History:  
Procedure Laterality Date  HX BREAST LUMPECTOMY Left 2008  HX BREAST LUMPECTOMY Right 6/27/2019 RIGHT BREAST LUMPECTOMY, RIGHT BREAST SENTINEL NODE BIOPSY performed by Pamela Agosto MD at [facility] OR  
 [address] W/CLIP AND SPECIMEN Left 2008  
 multiple areas sampled--one area breast CA Diet prior to admission: no difficulty with PO per spouse Current Diet:  NPO/no diet entered Cognitive and Communication Status: 
Neurologic State: (alert) Orientation Level: Oriented to person, Disoriented to place, Unable to verbalize, Disoriented to time Cognition: (slow command following) Perception: Appears intact Perseveration: No perseveration noted Swallowing Evaluation:  
Oral Assessment: 
Oral Assessment Labial: No impairment Dentition: Lower dentures Oral Hygiene: slightly pale tongue, appears clean Lingual: No impairment Mandible: No impairment P.O. Trials: Patient Position: upright in bed Vocal quality prior to P.O.: Low volume Consistency Presented: Puree; Solid; Thin liquid; Ice chips How Presented: SLP-fed/presented Bolus Acceptance: No impairment Bolus Formation/Control: Impaired Type of Impairment: Delayed;Mastication(appeared to wince or intensely concentrate with solids.) Propulsion: Discoordination;Delayed (# of seconds) Oral Residue: None Initiation of Swallow: No impairment Laryngeal Elevation: Functional 
Aspiration Signs/Symptoms: None Pharyngeal Phase Characteristics: No impairment, issues, or problems Oral Phase Severity: Mild Pharyngeal Phase Severity : Minimal(appears functional, but recent hx of pill found in pharynx.) NOMS:  
The NOMS functional outcome measure was used to quantify this patient's level of swallowing impairment. Based on the NOMS, the patient was determined to be at level 5 for swallow function NOMS Swallowing Levels: 
Level 1 (CN): NPO Level 2 (CM): NPO but takes consistency in therapy Level 3 (CL): Takes less than 50% of nutrition p.o. and continues with nonoral feedings; and/or safe with mod cues; and/or max diet restriction Level 4 (CK): Safe swallow but needs mod cues; and/or mod diet restriction; and/or still requires some nonoral feeding/supplements Level 5 (CJ): Safe swallow with min diet restriction; and/or needs min cues Level 6 (CI): Independent with p.o.; rare cues; usually self cues; may need to avoid some foods or needs extra time Level 7 (CH): Independent for all p.o. STEFFI. (2003). National Outcomes Measurement System (NOMS): Adult Speech-Language Pathology User's Guide. Speech/Language Evaluation Motor Speech: 
Oral-Motor Structure/Motor Speech Face: No impairment Labial: No impairment Dentition: Lower dentures Oral Hygiene: slightly pale tongue, appears clean Lingual: No impairment Mandible: No impairment Apraxic Characteristics: None Dysarthric Characteristics: None Rate: slow Prosody: monotone Language Comprehension and Expression: Auditory Comprehension Auditory Impairment: Yes Response to Basic Yes/No Questions (%): 100 % Response to Moderately Complex Yes/No Questions (%): 100 %(but needed max repetition.) One-Step Basic Commands (%): 50 %(slow to respons) Two-Step Basic Commands (%): (0) Interfering Components: Processing speed Effective Techniques: Repetition Cueing type: Verbal 
Verbal Expression Primary Mode of Expression: Verbal 
Initiation: Impaired (%)(slow to respond, need repetition of questions.) Automatic Speech Task: Impaired (comment)(needed A to count in unison) Automatic speech task cueing type: Verbal;Imitation Naming: Impaired Confrontation (%): (all names given were correct, but did not reposnd to some questions.) Divergent (%): 0 % Sentence Completion: No impairment Sentence completion cueing type: Verbal 
Conversation: Fluent(responded better in simple conversation than with more structured tasks. Able to state bio info and have simple conversation about food preferences.) Speech Characteristics: Word retrieval(does not respond with more difficult questions, not able to iniitate \"I don't know\") Effective Techniques: Cueing;Provide extra time Overall Impairment: Moderate Neuro-Linguistics: 
  
  
  
  
  
  
  
  
  
  
  
  
  
  
  
  
Pragmatics: 
  
  
Voice: 
  
  
  
  
  
Vocal Quality: Low volume NOMS: 5 swallow Pain: 
Pain Scale 1: Numeric (0 - 10) Pain Intensity 1: 8 Pain Location 1: Head After treatment:  
Patient left in no apparent distress in bed and Call bell within reach COMMUNICATION/EDUCATION:  
Patient was educated regarding her deficit(s) of mild oral dysphagia  as this relates to her diagnosis of AMS. She demonstrated Guarded understanding as evidenced by her responses. The patient's plan of care including recommendations, planned interventions, and recommended diet changes were discussed with: Registered nurse. Patient/family agree to work toward stated goals and plan of care. Thank you for this referral. 
YASIR Hadring Time Calculation: 20 mins

## 2020-10-02 NOTE — PROGRESS NOTES
Problem: Mobility Impaired (Adult and Pediatric) Goal: *Acute Goals and Plan of Care (Insert Text) Description: FUNCTIONAL STATUS PRIOR TO ADMISSION: Patient required moderate assistance for basic and total A for instrumental ADLs.  reporting he assists with bathing, dressing and toileting. Has a cane, walker, W/C and shower chair but patient was not using them consistently PTA. HOME SUPPORT: The patient lived with  who provides assistance.  reports having lots of family and friends to assist as needed. Physical Therapy Goals Initiated 10/2/2020 1. Patient will move from supine to sit and sit to supine  in bed with supervision/set-up within 7 day(s). 2.  Patient will transfer from bed to chair and chair to bed with minimal assistance/contact guard assist using the least restrictive device within 7 day(s). 3.  Patient will perform sit to stand with contact guard assist within 7 day(s). 4.  Patient will ambulate with minimal assistance/contact guard assist for 25 feet with the least restrictive device within 7 day(s). 5.  Patient will ascend/descend 3 stairs with 1 handrail(s) with moderate assistance  within 7 day(s). Outcome: Progressing Towards Goal 
  
PHYSICAL THERAPY EVALUATION Patient: Jeff Rodriguez (18 y.o. female) Date: 10/2/2020 Primary Diagnosis: Hydrocephalus ([facility] [address] [G91.9] NPH (normal pressure hydrocephalus) ([facility] [address] [G91.2] Procedure(s) (LRB): VENTRICULAR-PERITONEAL SHUNT PLACEMENT (GEN SURG TO POST) (N/A) 1 Day Post-Op Precautions: Fall ASSESSMENT Based on the objective data described below, the patient presents with generalized weakness R>L, impaired balance and coordination, decreased motor planning all limiting functional mobility s/p  shunt placement POD1. Patient requiring assist x2 for OOB mobility with extra time and cues required for all mobility.  Unable to take steps today due to poor motor planning and weakness. Patient reporting mild HA but VSS throughout. Returned to bed with  at bedside. Current Level of Function Impacting Discharge (mobility/balance): modA x2 for bed and transfer mobility Functional Outcome Measure: The patient scored 2/56 on the Sharp balance scale outcome measure which is indicative of high fall risk. Other factors to consider for discharge:  would like patient to return home vs rehab. He arranged for family and friends to assist in the home Patient will benefit from skilled therapy intervention to address the above noted impairments. PLAN : 
Recommendations and Planned Interventions: bed mobility training, transfer training, gait training, therapeutic exercises, neuromuscular re-education, patient and family training/education, and therapeutic activities Frequency/Duration: Patient will be followed by physical therapy:  5 times a week to address goals. Recommendation for discharge: (in order for the patient to meet his/her long term goals) Physical therapy at least 2 days/week in the home This discharge recommendation: 
Has not yet been discussed the attending provider and/or case management IF patient discharges home will need the following DME: none at this time. They have RW, WC, cane, shower chair SUBJECTIVE:  
Patient stated yes.  delayed responses when asked simple questions OBJECTIVE DATA SUMMARY:  
HISTORY:   
Past Medical History:  
Diagnosis Date Acoustic neuroma ([facility] [address] Breast cancer [facility]) 2008 Left Breast cancer ([facility] [address] 07/2019 RIGHT Communicating hydrocephalus ([facility] [address] Hyperlipidemia 6/30/2017 Memory disorder Radiation therapy complication Lt breast 2008--no chemo Thyroid disease HYPOTHYROID Past Surgical History:  
Procedure Laterality Date HX BREAST LUMPECTOMY Left 2008 HX BREAST LUMPECTOMY Right 6/27/2019 RIGHT BREAST LUMPECTOMY, RIGHT BREAST SENTINEL NODE BIOPSY performed by Louellen Osler, MD at [facility] AMBULATORY OR  
 [facility] 1ST LESION W/CLIP AND SPECIMEN Left 2008  
 multiple areas sampled--one area breast CA Personal factors and/or comorbidities impacting plan of care: CA,  shunt placement Home Situation Home Environment: Private residence # Steps to Enter: 4 Rails to Enter: Yes Hand Rails : Bilateral 
One/Two Story Residence: One story Living Alone: No 
Support Systems: Spouse/Significant Other/Partner Current DME Used/Available at Home: Hortensia Loud, rolling, Wheelchair, Eluterio Hilt, straight, [id number] [employer] chair Tub or Shower Type: Tub/Shower combination EXAMINATION/PRESENTATION/DECISION MAKING:  
Critical Behavior: 
Neurologic State: Alert, Confused Orientation Level: Oriented to person, Disoriented to place, Disoriented to situation, Disoriented to time Cognition: Decreased attention/concentration, Decreased command following Safety/Judgement: Decreased awareness of environment, Decreased awareness of need for assistance, Decreased awareness of need for safety, Decreased insight into deficits, Fall prevention Hearing: 
  
Skin:  incision bandaged, C/D? I Edema: NT 
Range Of Motion: 
AROM: Generally decreased, functional 
  
  
  
  
  
  
  
Strength:   
Strength: Generally decreased, functional 
  
 RLE weaker than LLE Tone & Sensation:  
Tone: Normal 
  
  
  
  
Sensation: Intact Coordination: 
Coordination: Generally decreased, functional 
Vision:  
Tracking: Unable to test secondary due to decreased visual attention Functional Mobility: 
Bed Mobility: 
  
Supine to Sit: Moderate assistance; Additional time;Assist x2 Sit to Supine: Moderate assistance; Additional time;Assist x2 Transfers: 
Sit to Stand: Moderate assistance; Adaptive equipment;Assist x2 Stand to Sit: Moderate assistance; Additional time;Assist x2 Balance: Sitting: Impaired Sitting - Static: Good (unsupported) Sitting - Dynamic: Fair (occasional) Standing: Impaired; With support Standing - Static: Constant support;Poor Standing - Dynamic : Constant support;Poor Ambulation/Gait Training: 
  
  
  
  
Gait Description (WDL): (attemped but unable to advance feet) Sharp Balance Test: 
 
Sitting to Standin Standing Unsupported: 0 Sitting with Back Unsupported: 2 Standing to Sittin Transfers: 0 Standing Unsupported with Eyes Closed: 0 Standing Unsupported with Feet Together: 0 Reach Forward with Outstretched Arm: 0  Object: 0 Turn to Look Over Shoulders: 0 Turn 360 Degrees: 0 Alternate Foot on Step/Stool: 0 Standing Unsupported One Foot in Front: 0 Stand on One Le Total: 2 
  
 
 
56=Maximum possible score;  
0-20=High fall risk 21-40=Moderate fall risk 41-56=Low fall risk Physical Therapy Evaluation Charge Determination History Examination Presentation Decision-Making HIGH Complexity :3+ comorbidities / personal factors will impact the outcome/ POC  MEDIUM Complexity : 3 Standardized tests and measures addressing body structure, function, activity limitation and / or participation in recreation  MEDIUM Complexity : Evolving with changing characteristics  HIGH Complexity : FOTO score of 1- 25 Based on the above components, the patient evaluation is determined to be of the following complexity level: MEDIUM Pain Rating: 
Reported mild-mod HA but unable to rate Activity Tolerance:  
Good and Fair Please refer to the flowsheet for vital signs taken during this treatment. After treatment patient left in no apparent distress:  
Supine in bed, Call bell within reach, and Caregiver / family present COMMUNICATION/EDUCATION:  
The patients plan of care was discussed with: Occupational therapist and Registered nurse. Fall prevention education was provided and the patient/caregiver indicated understanding. and Patient/family have participated as able in goal setting and plan of care. Thank you for this referral. 
Carla Nesbitt, PT Time Calculation: 31 mins

## 2020-10-02 NOTE — OP NOTES
[address] 
OPERATIVE REPORT Name:  Audrey Izaguirre 
MR#:  [id number] :  1955 ACCOUNT #:  [de-identified] DATE OF SERVICE:  10/01/2020 PREOPERATIVE DIAGNOSIS:  Communicating normal pressure hydrocephalus. POSTOPERATIVE DIAGNOSIS:  Communicating normal pressure hydrocephalus. PROCEDURE PERFORMED:  Insertion of new ventriculoperitoneal shunt. SURGEON:  Orlando Cameron MD 
 
CO-SURGEON:  Michelle Barker MD 
 
ASSISTANT: none ANESTHESIA:  General. 
 
COMPLICATIONS:  None. SPECIMENS REMOVED:  CSF. IMPLANTS:  Regular contoured programmable Strata Medtronic valve. ESTIMATED BLOOD LOSS:  Minimal. 
 
OPERATIVE PROCEDURE:  Review of the imaging studies revealed enlarging ventricles over time in this patient who had undergone Gamma knife therapy for small acoustic neuroma. She was beginning to develop classic signs of normal pressure hydrocephalus including gait disturbance, urinary incontinence, and memory loss. After discussing risks, benefits, and alternatives with her and her family, the family wished for us to proceed. She was taken to the operating room where she was induced under general endotracheal anesthesia, placed on the operating table in the supine position. Sterile scrub, prep, and drape of the entire right side and right suboccipital region and neck was performed. A time-out was performed to identify the correct patient and procedure. Appropriate antibiotics were administered prior to making the incision and sequential stockings were applied for DVT prophylaxis. A horseshoe shaped inverted incision 6 cm above the inion 2-3 cm off the midline on the right side was made and a bur hole was placed. A ventricular catheter was passed into the lateral ventricle without difficulty getting clear spinal fluid back.   It was clamped off and then attention was directed to the right upper quadrant and midline where a small incision was made and a subcutaneous tunnel was developed using the trocar. A peritoneal catheter was pulled through that trocar, attached to the valve, secured with a 2-0 silk tie, and then the proximal end of the valve was secured to the ventricular catheter after it was trimmed to size and secured with a silk tie as well. The valve was then secured to the periosteum with 4-0 Nurolon sutures. Dr. Cheng Young will dictate his portion of the laparoscopic procedure in bringing the catheter into the abdomen. The galea was closed with 2-0 interrupted inverted Vicryl sutures and staples were applied to the skin edges as well, and as well, 2-0 Vicryl was used to close the subcutaneous layer of the abdominal incision and then Dermabond applied to the skin edges. Needle, sponge, and instrument count were correct at the end of the procedure. Good spinal fluid flow was noted to be emanating from the bottom of the peritoneal catheter before placing it into the abdomen. MD MICAELA [id number] 
D:  10/01/2020 16:44 
T:  10/01/2020 23:07 JOB #:  [id number] CC:  Dmitriy Sauceda MD

## 2020-10-02 NOTE — OP NOTES
[address]  
OPERATIVE REPORT Name:  Roni Diamond 
MR#:  [id number] :  1955 ACCOUNT #:  [de-identified] DATE OF SERVICE:  10/01/2020 PREOPERATIVE DIAGNOSIS:  Normal-pressure hydrocephalus. POSTOPERATIVE DIAGNOSIS:  Normal-pressure hydrocephalus. PROCEDURES PERFORMED:  Diagnostic laparoscopy and positioning of the peritoneal portion of a ventriculoperitoneal shunt. SURGEON:  Brody Bagley MD 
 
CO-SURGEON:  Danilo Hartman MD 
 
ASSISTANT:  Alfredo Lui. ANESTHESIA:  General supplemented with 0.5% Marcaine with epinephrine. COMPLICATIONS:  None. SPECIMENS REMOVED:  none IMPLANTS:  Per Dr. Bouchra Singh. ESTIMATED BLOOD LOSS:  Minimal. 
 
DRAINS:  None. CONDITION:  Good to the PACU. PROCEDURE:  With the patient on the operating table, she was prepared with Betadine including the entire head, neck, chest and abdomen. The abdomen was covered with an Ioban drape. Dr. Bouchra Singh had placed the peritoneal portion of the shunt and tunneled it up to the side of the head. I placed a 5 mm trocar into the right lower quadrant after anesthetizing the skin with Marcaine. This was done under direct vision using a VisiPort technique, and then pneumoperitoneum was established. There were multiple adhesions in the abdominal wall, so I placed a second trocar in the midline just between these adhesions, was able to move some out of the way, and I was then able to enter the peritoneal cavity superiorly with an introducer dilator and sheath. This was done under direct vision. The dilator was removed, and the catheter was then passed down the sheath, and the sheath was broken away. The entire length of the catheter was then brought into the abdomen, and the tip of it was placed down into the pelvis on the left side. Everything appeared suitable, and therefore, the pneumoperitoneum was released, and the trocars were removed.   The 2 trocar sites were closed with interrupted 4-0 Vicryl to the subcuticular layer followed by Dermabond. At termination of the procedure, all counts were correct. The patient tolerated this well and was brought to the PACU in satisfactory condition. Nolan Gambino MD 
 
 
[id number] 
D:  10/01/2020 15:18 
T:  10/01/2020 21:28 
JOB #:  [id number] CC:  MD Lavern Ruelas NP

## 2020-10-02 NOTE — PROGRESS NOTES
Neurosurgery Progress Note Romeo Fletcher, [facility] 
[phone] Admit Date: 10/1/2020 LOS: 1 day Daily Progress Note: 10/2/2020 POD:1 Day Post-Op S/P: Procedure(s): VENTRICULAR-PERITONEAL SHUNT PLACEMENT (GEN SURG TO POST) Subjective: Pt with history of dementia. Admitted for  shunt placement for NPH. Underwent procedure on 10/01/2020. Seemed to be taking more this morning. During surgery, had good flow of CSF from both ends of the catheter. Unable to obtain ROS due to dementia Objective:  
 
Vital signs Temp (24hrs), Av.9 °F (36.6 °C), Min:97.4 °F (36.3 °C), Max:98.3 °F (36.8 °C) 
 10/02 0701 - 10/02 190 In: -  
Out: 700 [Urine:700]  1901 - 10/02 0700 In: 1325 [I.V.:1325] Out: 1800 [FMRAU:7268] Visit Vitals /73 (BP 1 Location: Left arm, BP Patient Position: At rest) Pulse 87 Temp 97.4 °F (36.3 °C) Resp 15 Ht 5' 6\" (1.676 m) Wt 76.2 kg (168 lb) LMP  (LMP Unknown) SpO2 97% BMI 27.12 kg/m² O2 Flow Rate (L/min): 2 l/min O2 Device: Room air Pain control Pain Assessment Pain Scale 1: Numeric (0 - 10) Pain Intensity 1: 0 Pain Location 1: Head 
Pain Description 1: Aching Pain Intervention(s) 1: Rest, Repositioned, Food PT/OT Gait Physical Exam: 
Gen:NAD. Neuro: Awake. Oriented to self. Unable to tell me place or year. Follows simple commands. Speech clear. Affect flat. PERRL. Face symmetric. DOOLEY spontaneously. Negative drift. Gait deferred. Skin: Scalp dressing C/D/I Abdominal incisions intact with dermabond in place CT head without contrast on 10/02/2020 shows recent right occipital approach ventriculostomy catheter placement, with tip near the inferior right basal ganglia as described. Correlate with surgical findings/line function. Mild ventriculomegaly similar to prior study. No acute intracranial hemorrhage or infarction. 24 hour results: 
 
Recent Results (from the past 24 hour(s)) GLUCOSE, POC Collection Time: 10/02/20  6:38 AM  
Result Value Ref Range Glucose (POC) 100 65 - 100 mg/dL Performed by Julian Franklin GLUCOSE, POC Collection Time: 10/02/20 11:38 AM  
Result Value Ref Range Glucose (POC) 121 (H) 65 - 100 mg/dL Performed by Niurka Ko (CON) Assessment:  
 
Active Problems: 
  Hydrocephalus ([facility] [address] (10/1/2020) NPH (normal pressure hydrocephalus) ([facility] [address] (10/1/2020) Plan: 1. NPH 
 - s/p  shunt placement 10/01 
 - normalize and mobilize 
 - PT/OT/Speech 
 - downgrade to regular NSTU status and q4h neuro checks 
 - watch pt over the weekend. Dr. Maile Aldridge may need to reposition the intracranial portion of the  shunt.  unsure if he wants her to undergo more general anesthesia. Will have Dr. Maile Aldridge speak with  and will make patient NPO after midnight Sunday night in case of surgery for repositioning catheter. 
 - Restart namenda and aricept 2. Dsylipidemia 
 - Cont statin from home 3. Hypothyroidism 
 - Cont levothyroxine from home DVT ppx: SCDs Dispo: tbd 
 
Plan d/w Dr. Maile Aldridge,  at bedside, nurse.  
 
 
Malathi Yeboah NP

## 2020-10-02 NOTE — PROGRESS NOTES
Problem: Self Care Deficits Care Plan (Adult) Goal: *Acute Goals and Plan of Care (Insert Text) Description:  
FUNCTIONAL STATUS PRIOR TO ADMISSION: Patient required moderate assistance for basic and total A for instrumental ADLs.  reporting he assists with bathing, dressing and toileting. Has a cane, walker, W/C and shower chair but patient was not using them consistently PTA. HOME SUPPORT: The patient lived with  who provides assistance.  reports having lots of family and friends to assist as needed. Occupational Therapy Goals Initiated 10/2/2020 1. Patient will perform grooming sitting EOB for 5 minutes with minimal assistance/contact guard assist within 7 day(s). 2.  Patient will perform anterior neck to thigh bathing with minimal assistance/contact guard assist within 7 day(s). 3.  Patient will perform lower body dressing with minimal assistance within 7 day(s). 4.  Patient will perform toilet transfers with minimal assistance/contact guard assist within 7 day(s). 5.  Patient will perform all aspects of toileting with minimal assistance/contact guard assist within 7 day(s). 6.  Patient will participate in upper extremity therapeutic exercise/activities with supervision/set-up for 5 minutes within 7 day(s). 7.  Patient will utilize energy conservation techniques during functional activities with verbal cues within 7 day(s). Outcome: Not Met OCCUPATIONAL THERAPY EVALUATION Patient: Duglas Olmedo (10 y.o. female) Date: 10/2/2020 Primary Diagnosis: Hydrocephalus ([facility] [G91.9] [facility]) [facility] [G91.2] Procedure(s) (LRB): VENTRICULAR-PERITONEAL SHUNT PLACEMENT (GEN SURG TO POST) (N/A) 1 Day Post-Op Precautions: falls ASSESSMENT Based on the objective data described below, the patient presents with limited ADL performance s/p admission for NPH resulting in need for  shunt placement. Patient ADLs limited by impaired balance, generalized weakness, decreased functional activity tolerance, coordination, aphasia, and cognition (attention to task, command following, processing time, STM, sequencing, etc). At baseline, patient lives at home with her  who assists with ADLs and mobility Current Level of Function Impacting Discharge (ADLs/self-care): up to max A for ADLs, mod Ax2 for mobility Functional Outcome Measure: The patient scored 15/100 on the Barthel Index outcome measure which is indicative of severe deficits in ADLs. Other factors to consider for discharge: good support, has all needed DME Patient will benefit from skilled therapy intervention to address the above noted impairments. PLAN : 
Recommendations and Planned Interventions: self care training, functional mobility training, therapeutic exercise, balance training, therapeutic activities, endurance activities, neuromuscular re-education, patient education, home safety training, and family training/education Frequency/Duration: Patient will be followed by occupational therapy 5 times a week to address goals. Recommendation for discharge: (in order for the patient to meet his/her long term goals) Occupational therapy at least 2 days/week in the home AND ensure assist and/or supervision for safety with IADLs and mobility This discharge recommendation: 
Has not yet been discussed the attending provider and/or case management IF patient discharges home will need the following DME: patient owns DME required for discharge SUBJECTIVE:  
Patient stated \"yes\" re: when asked if her head was hurting OBJECTIVE DATA SUMMARY:  
HISTORY:  
Past Medical History:  
Diagnosis Date [facility] Breast cancer [facility]) 2008 Left Breast cancer [facility] 07/2019 RIGHT Communicating hydrocephalus ([facility] Hyperlipidemia 6/30/2017 Memory disorder Radiation therapy complication Lt breast 2008--no chemo Thyroid disease HYPOTHYROID Past Surgical History:  
Procedure Laterality Date HX BREAST LUMPECTOMY Left 2008 HX BREAST LUMPECTOMY Right 6/27/2019 RIGHT BREAST LUMPECTOMY, RIGHT BREAST SENTINEL NODE BIOPSY performed by Estela Alcantar MD at Replaced by [facility] [place]  
 [facility] LT 1ST LESION W/CLIP AND SPECIMEN Left 2008  
 multiple areas sampled--one area breast CA Expanded or extensive additional review of patient history:  
 
Home Situation Home Environment: Private residence # Steps to Enter: 4 Rails to Enter: Yes Hand Rails : Bilateral 
One/Two Story Residence: One story Living Alone: No 
Support Systems: Spouse/Significant Other/Partner Current DME Used/Available at Home: Denise Kathleen, nasima, Wheelchair, [address], [place], straight, [address] chair Tub or Shower Type: Tub/Shower combination Hand dominance: Right EXAMINATION OF PERFORMANCE DEFICITS: 
Cognitive/Behavioral Status: 
Neurologic State: Alert;Confused Orientation Level: Oriented to person;Disoriented to place; Disoriented to situation;Disoriented to time Cognition: Decreased attention/concentration;Decreased command following Perception: Cues to maintain midline in standing; Tactile;Verbal;Cues to attend to left side of body;Cues to attend to right side of body Perseveration: No perseveration noted Safety/Judgement: Decreased awareness of environment;Decreased awareness of need for assistance;Decreased awareness of need for safety;Decreased insight into deficits; Fall prevention Skin: appears grossly intact Edema: none noted in BUEs Hearing: 
  
 
Vision/Perceptual:   
Tracking: Unable to test secondary due to decreased visual attention Range of Motion: In [address] AROM: Generally decreased, functional 
 
Strength: In [address] Strength: Generally decreased, functional 
 
Coordination: 
Coordination: Generally decreased, functional 
 Fine Motor Skills-Upper: Left Impaired;Right Impaired Gross Motor Skills-Upper: Left Intact; Right Intact Tone & Sensation: In [address] Tone: Normal 
Sensation: Intact Balance: 
Sitting: Impaired Sitting - Static: Good (unsupported) Sitting - Dynamic: Fair (occasional) Standing: Impaired; With support Standing - Static: Constant support;Poor Standing - Dynamic : Constant support;Poor Functional Mobility and Transfers for ADLs: 
Bed Mobility: 
Supine to Sit: Moderate assistance; Additional time;Assist x2 Sit to Supine: Moderate assistance; Additional time;Assist x2 Transfers: 
Sit to Stand: Moderate assistance; Adaptive equipment;Assist x2 Stand to Sit: Moderate assistance; Additional time;Assist x2 ADL Assessment: 
Feeding: Moderate assistance* Oral Facial Hygiene/Grooming: Moderate assistance* Bathing: Maximum assistance* Upper Body Dressing: Moderate assistance* Lower Body Dressing: Maximum assistance* Toileting: Maximum assistance* 
 
*Infer per obs of functional reach, balance, strength, cognition, attention to task, processing, etc 
 
ADL Intervention and task modifications: 
  
Cognitive Retraining Safety/Judgement: Decreased awareness of environment;Decreased awareness of need for assistance;Decreased awareness of need for safety;Decreased insight into deficits; Fall prevention Functional Measure: 
Barthel Index: 
 
Bathin Bladder: 0(chirinos) Bowels: 5 Groomin Dressin Feedin Mobility: 0 Stairs: 0 Toilet Use: 0 Transfer (Bed to Chair and Back): 5 Total: 15/100 The Barthel ADL Index: Guidelines 1. The index should be used as a record of what a patient does, not as a record of what a patient could do. 2. The main aim is to establish degree of independence from any help, physical or verbal, however minor and for whatever reason. 3. The need for supervision renders the patient not independent. 4. A patient's performance should be established using the best available evidence. Asking the patient, friends/relatives and nurses are the usual sources, but direct observation and common sense are also important. However direct testing is not needed. 5. Usually the patient's performance over the preceding 24-48 hours is important, but occasionally longer periods will be relevant. 6. Middle categories imply that the patient supplies over 50 per cent of the effort. 7. Use of aids to be independent is allowed. Jori Proctor., Barthel, D.W. (2278). Functional evaluation: the Barthel Index. [facility] (14)2. Audie Screen nelson COURTNEY Geller, Phil Daly., Nicolas Villafana., Sanam Pearson, [facility] (1999). Measuring the change indisability after inpatient rehabilitation; comparison of the responsiveness of the Barthel Index and Functional [place] Measure. Journal of Neurology, Neurosurgery, and Psychiatry, 66(4), 443-567. Fran Aldridge, N.J.A, DAIN Fonseca, & Elenita Flores M.A. (2004.) Assessment of post-stroke quality of life in cost-effectiveness studies: The usefulness of the Barthel Index and the EuroQoL-5D. [facility], 13, 154-12 Occupational Therapy Evaluation Charge Determination History Examination Decision-Making LOW Complexity : Brief history review  MEDIUM Complexity : 3-5 performance deficits relating to physical, cognitive , or psychosocial skils that result in activity limitations and / or participation restrictions HIGH Complexity : Patient presents with comorbidities that affect occupational performance. Signifigant modification of tasks or assistance (eg, physical or verbal) with assessment (s) is necessary to enable patient to complete evaluation Based on the above components, the patient evaluation is determined to be of the following complexity level: MEDIUM Pain Rating: 
Responds \"yes\" when asked if her head hurts, unable to quantify Activity Tolerance: Fair and BP/ VSS Please refer to the flowsheet for vital signs taken during this treatment. After treatment patient left in no apparent distress:   
Supine in bed, Call bell within reach, Caregiver / family present, Side rails x 3, and RN aware COMMUNICATION/EDUCATION:  
The patients plan of care was discussed with: Physical therapist and Registered nurse. Home safety education was provided and the patient/caregiver indicated understanding. and Patient/family have participated as able in goal setting and plan of care. This patients plan of care is appropriate for delegation to [facility]. Thank you for this referral. 
Ronnie Titus OT Time Calculation: 38 mins

## 2020-10-03 PROCEDURE — 74011250636 HC RX REV CODE- 250/636: Performed by: NEUROLOGICAL SURGERY

## 2020-10-03 PROCEDURE — 65660000000 HC RM CCU STEPDOWN

## 2020-10-03 PROCEDURE — 74011250637 HC RX REV CODE- 250/637: Performed by: NURSE PRACTITIONER

## 2020-10-03 PROCEDURE — 94760 N-INVAS EAR/PLS OXIMETRY 1: CPT

## 2020-10-03 RX ORDER — LETROZOLE 2.5 MG/1
2.5 TABLET, FILM COATED ORAL DAILY
COMMUNITY
End: 2020-01-01 | Stop reason: ALTCHOICE

## 2020-10-03 RX ORDER — LETROZOLE 2.5 MG/1
2.5 TABLET, FILM COATED ORAL DAILY
Status: DISCONTINUED | OUTPATIENT
Start: 2020-10-03 | End: 2020-01-01 | Stop reason: HOSPADM

## 2020-10-03 RX ADMIN — DONEPEZIL HYDROCHLORIDE 10 MG: 10 TABLET, FILM COATED ORAL at 21:30

## 2020-10-03 RX ADMIN — Medication 10 ML: at 15:00

## 2020-10-03 RX ADMIN — Medication 10 ML: at 07:00

## 2020-10-03 RX ADMIN — Medication 10 ML: at 22:00

## 2020-10-03 RX ADMIN — PRAVASTATIN SODIUM 10 MG: 10 TABLET ORAL at 21:30

## 2020-10-03 RX ADMIN — MEMANTINE HYDROCHLORIDE 10 MG: 10 TABLET ORAL at 09:18

## 2020-10-03 RX ADMIN — LEVOTHYROXINE SODIUM 25 MCG: 0.03 TABLET ORAL at 06:56

## 2020-10-03 RX ADMIN — LETROZOLE 2.5 MG: 2.5 TABLET ORAL at 12:34

## 2020-10-03 NOTE — PROGRESS NOTES
Bedside shift change report given to ARMANDO Kurtz (oncoming nurse) by Genevieve Fontenot RN (offgoing nurse). Report included the following information SBAR, Kardex, Intake/Output, MAR, Accordion, Recent Results, Cardiac Rhythm NSR and Dual Neuro Assessment.

## 2020-10-03 NOTE — PROGRESS NOTES
200- Primary nurse in room to administer night time medications, informed patient and  what medications were being given.  stated that he had already given patient these medications. Primary nurse asked which medications were given and looked at pill bottles that were in room from home. Educated  that while in the hospital the nurse will be administering all medications.  stated, \"I am very sorry, I will take these medications home with me in the morning\".

## 2020-10-03 NOTE — PROGRESS NOTES
Awake, alert. Minimal complaints. fc bilat. Oriented to name and place. Plan for d/c Monday per Dr. Karol Spencer

## 2020-10-03 NOTE — PROGRESS NOTES
Bedside and Verbal shift change report given to Joi Moran RN (oncoming nurse) by Jaci Vieira RN (offgoing nurse). Report included the following information SBAR, Kardex, Procedure Summary, Intake/Output, MAR, Recent Results, Cardiac Rhythm NSR and Dual Neuro Assessment.

## 2020-10-03 NOTE — PROGRESS NOTES
Problem: Falls - Risk of 
Goal: *Absence of Falls Description: Document Castro Proctor Fall Risk and appropriate interventions in the flowsheet. Outcome: Progressing Towards Goal 
Note: Fall Risk Interventions: 
Mobility Interventions: Bed/chair exit alarm, Patient to call before getting OOB Mentation Interventions: Bed/chair exit alarm, Increase mobility Medication Interventions: Bed/chair exit alarm, Teach patient to arise slowly Elimination Interventions: Call light in reach Problem: Pain Goal: *Control of Pain Outcome: Progressing Towards Goal

## 2020-10-03 NOTE — PROGRESS NOTES
Problem: Falls - Risk of 
Goal: *Absence of Falls Description: Document Katty Hall Fall Risk and appropriate interventions in the flowsheet. Outcome: Progressing Towards Goal 
Note: Fall Risk Interventions: 
Mobility Interventions: Communicate number of staff needed for ambulation/transfer, Patient to call before getting OOB Mentation Interventions: Bed/chair exit alarm, Adequate sleep, hydration, pain control, Evaluate medications/consider consulting pharmacy Medication Interventions: Bed/chair exit alarm, Evaluate medications/consider consulting pharmacy, Patient to call before getting OOB Elimination Interventions: Call light in reach, Bed/chair exit alarm, Patient to call for help with toileting needs, Stay With Me (per policy) Problem: Pressure Injury - Risk of 
Goal: *Prevention of pressure injury Description: Document Hari Scale and appropriate interventions in the flowsheet. Outcome: Progressing Towards Goal 
Note: Pressure Injury Interventions: 
Sensory Interventions: Assess changes in LOC, Assess need for specialty bed, Float heels, Keep linens dry and wrinkle-free, Maintain/enhance activity level, Minimize linen layers, Monitor skin under medical devices Activity Interventions: Increase time out of bed, Pressure redistribution bed/mattress(bed type), PT/OT evaluation Mobility Interventions: HOB 30 degrees or less, Pressure redistribution bed/mattress (bed type), PT/OT evaluation Nutrition Interventions: Document food/fluid/supplement intake, Discuss nutritional consult with provider Friction and Shear Interventions: HOB 30 degrees or less, Lift team/patient mobility team, Minimize layers Problem: Pain Goal: *Control of Pain Outcome: Progressing Towards Goal

## 2020-10-04 PROCEDURE — 74011250636 HC RX REV CODE- 250/636: Performed by: NEUROLOGICAL SURGERY

## 2020-10-04 PROCEDURE — 65660000000 HC RM CCU STEPDOWN

## 2020-10-04 PROCEDURE — 74011250637 HC RX REV CODE- 250/637: Performed by: NEUROLOGICAL SURGERY

## 2020-10-04 PROCEDURE — 74011250637 HC RX REV CODE- 250/637: Performed by: NURSE PRACTITIONER

## 2020-10-04 RX ADMIN — MEMANTINE HYDROCHLORIDE 10 MG: 10 TABLET ORAL at 08:29

## 2020-10-04 RX ADMIN — ACETAMINOPHEN 650 MG: 325 TABLET ORAL at 07:14

## 2020-10-04 RX ADMIN — Medication 10 ML: at 22:00

## 2020-10-04 RX ADMIN — LETROZOLE 2.5 MG: 2.5 TABLET ORAL at 08:30

## 2020-10-04 RX ADMIN — Medication 10 ML: at 07:14

## 2020-10-04 RX ADMIN — Medication 10 ML: at 16:42

## 2020-10-04 RX ADMIN — PRAVASTATIN SODIUM 10 MG: 10 TABLET ORAL at 22:00

## 2020-10-04 RX ADMIN — LEVOTHYROXINE SODIUM 25 MCG: 0.03 TABLET ORAL at 07:13

## 2020-10-04 RX ADMIN — DONEPEZIL HYDROCHLORIDE 10 MG: 10 TABLET, FILM COATED ORAL at 22:00

## 2020-10-04 NOTE — PROGRESS NOTES
Bedside shift change report given to Mine Lisa (oncoming nurse) by Jesus Oconnell (offgoing nurse). Report included the following information SBAR, Kardex, Intake/Output, MAR and Dual Neuro Assessment.

## 2020-10-04 NOTE — PROGRESS NOTES
Problem: Falls - Risk of 
Goal: *Absence of Falls Description: Document Anjelica Alston Fall Risk and appropriate interventions in the flowsheet. Outcome: Progressing Towards Goal 
Note: Fall Risk Interventions: 
Mobility Interventions: Bed/chair exit alarm, Communicate number of staff needed for ambulation/transfer, Patient to call before getting OOB Mentation Interventions: Bed/chair exit alarm, Evaluate medications/consider consulting pharmacy, Adequate sleep, hydration, pain control Medication Interventions: Bed/chair exit alarm, Evaluate medications/consider consulting pharmacy Elimination Interventions: Call light in reach, Elevated toilet seat, Patient to call for help with toileting needs, Stay With Me (per policy) Problem: Pressure Injury - Risk of 
Goal: *Prevention of pressure injury Description: Document Hari Scale and appropriate interventions in the flowsheet. Outcome: Progressing Towards Goal 
Note: Pressure Injury Interventions: 
Sensory Interventions: Assess changes in LOC, Float heels, Keep linens dry and wrinkle-free, Maintain/enhance activity level, Minimize linen layers, Monitor skin under medical devices, Turn and reposition approx. every two hours (pillows and wedges if needed) Moisture Interventions: Absorbent underpads, Apply protective barrier, creams and emollients, Maintain skin hydration (lotion/cream), Minimize layers Activity Interventions: Assess need for specialty bed, PT/OT evaluation Mobility Interventions: Float heels, HOB 30 degrees or less, Pressure redistribution bed/mattress (bed type), PT/OT evaluation Nutrition Interventions: Document food/fluid/supplement intake Friction and Shear Interventions: Apply protective barrier, creams and emollients, Minimize layers, HOB 30 degrees or less Problem: Pain Goal: *Control of Pain Outcome: Progressing Towards Goal

## 2020-10-04 NOTE — PROGRESS NOTES
Problem: Falls - Risk of 
Goal: *Absence of Falls Description: Document Gilles Langston Fall Risk and appropriate interventions in the flowsheet. Outcome: Progressing Towards Goal 
Note: Fall Risk Interventions: 
Mobility Interventions: Communicate number of staff needed for ambulation/transfer Mentation Interventions: Door open when patient unattended Medication Interventions: Evaluate medications/consider consulting pharmacy Elimination Interventions: Call light in reach, Toileting schedule/hourly rounds Problem: Pressure Injury - Risk of 
Goal: *Prevention of pressure injury Description: Document Hari Scale and appropriate interventions in the flowsheet. Outcome: Progressing Towards Goal 
Note: Pressure Injury Interventions: 
Sensory Interventions: Assess changes in LOC, Assess need for specialty bed, Check visual cues for pain, Discuss PT/OT consult with provider Moisture Interventions: Check for incontinence Q2 hours and as needed, Internal/External urinary devices Activity Interventions: Assess need for specialty bed, Increase time out of bed, Pressure redistribution bed/mattress(bed type), PT/OT evaluation Mobility Interventions: HOB 30 degrees or less, Pressure redistribution bed/mattress (bed type), PT/OT evaluation Nutrition Interventions: Document food/fluid/supplement intake, Discuss nutritional consult with provider Friction and Shear Interventions: Apply protective barrier, creams and emollients, HOB 30 degrees or less, Lift sheet

## 2020-10-05 PROCEDURE — 74011250637 HC RX REV CODE- 250/637: Performed by: NEUROLOGICAL SURGERY

## 2020-10-05 PROCEDURE — 74011250637 HC RX REV CODE- 250/637: Performed by: NURSE PRACTITIONER

## 2020-10-05 PROCEDURE — 97530 THERAPEUTIC ACTIVITIES: CPT

## 2020-10-05 PROCEDURE — 65660000000 HC RM CCU STEPDOWN

## 2020-10-05 PROCEDURE — 74011250636 HC RX REV CODE- 250/636: Performed by: NEUROLOGICAL SURGERY

## 2020-10-05 RX ADMIN — DONEPEZIL HYDROCHLORIDE 10 MG: 10 TABLET, FILM COATED ORAL at 22:01

## 2020-10-05 RX ADMIN — MORPHINE SULFATE 2 MG: 2 INJECTION, SOLUTION INTRAMUSCULAR; INTRAVENOUS at 03:17

## 2020-10-05 RX ADMIN — Medication 10 ML: at 17:16

## 2020-10-05 RX ADMIN — ACETAMINOPHEN 650 MG: 325 TABLET ORAL at 20:40

## 2020-10-05 RX ADMIN — HYDROCODONE BITARTRATE AND ACETAMINOPHEN 1 TABLET: 5; 325 TABLET ORAL at 22:01

## 2020-10-05 RX ADMIN — HYDROCODONE BITARTRATE AND ACETAMINOPHEN 1 TABLET: 5; 325 TABLET ORAL at 07:46

## 2020-10-05 RX ADMIN — LETROZOLE 2.5 MG: 2.5 TABLET ORAL at 09:10

## 2020-10-05 RX ADMIN — LEVOTHYROXINE SODIUM 25 MCG: 0.03 TABLET ORAL at 07:46

## 2020-10-05 RX ADMIN — Medication 10 ML: at 22:00

## 2020-10-05 RX ADMIN — PRAVASTATIN SODIUM 10 MG: 10 TABLET ORAL at 22:00

## 2020-10-05 RX ADMIN — HYDROCODONE BITARTRATE AND ACETAMINOPHEN 1 TABLET: 5; 325 TABLET ORAL at 17:15

## 2020-10-05 RX ADMIN — MEMANTINE HYDROCHLORIDE 10 MG: 10 TABLET ORAL at 09:11

## 2020-10-05 NOTE — PROGRESS NOTES
Neurosurgery Progress Note Leo Lantigua, [facility] 
[phone] Admit Date: 10/1/2020 LOS: 4 days Daily Progress Note: 10/5/2020 POD: 4 Day Post-Op S/P: Procedure(s): VENTRICULAR-PERITONEAL SHUNT PLACEMENT (GEN SURG TO POST) Subjective: No acute events over the weekend. Inpt rehab recommended, but  would like to take patient home at discharge. Working on getting jennifer healt hset up.  did not wish to put patient under general anesthesia again to adjust the intracranial portion of her  shunt. Unable to obtain ROS due to dementia Objective:  
 
Vital signs Temp (24hrs), Av.1 °F (36.7 °C), Min:97.7 °F (36.5 °C), Max:98.6 °F (37 °C) No intake/output data recorded. 10/03 1901 - 10/05 0700 In: -  
Out:  [address] [[id number]] Visit Vitals /72 (BP 1 Location: Left arm, BP Patient Position: At rest) Pulse 75 Temp 98.6 °F (37 °C) Resp 21 Ht 5' 6\" (1.676 m) Wt 76.4 kg (168 lb 8 oz) LMP  (LMP Unknown) SpO2 98% BMI 27.20 kg/m² O2 Flow Rate (L/min): 2 l/min O2 Device: Room air Pain control Pain Assessment Pain Scale 1: Numeric (0 - 10) Pain Intensity 1: 0 Pain Location 1: Leg 
Pain Description 1: Aching, Cramping Pain Intervention(s) 1: Medication (see MAR) PT/OT Gait     Gait Base of Support: Narrowed Speed/Amy: Delayed Ambulation - Level of Assistance: Maximum assistance Distance (ft): 4 Feet (ft) Assistive Device: Walker, rolling, Gait belt Physical Exam: 
Gen:NAD. Neuro: Awake. Oriented to self. Unable to tell me place or year. Follows simple commands. Speech clear. Affect flat. PERRL. Face symmetric. DOOLEY spontaneously. Negative drift. Gait deferred. Skin: Scalp dressing C/D/I. Valve pumps and refills. Abdominal incisions intact with dermabond in place CT head without contrast on 10/02/2020 shows recent right occipital approach ventriculostomy catheter placement, with tip near the inferior right basal ganglia as described. Correlate with surgical findings/line function. Mild ventriculomegaly similar to prior study. No acute intracranial hemorrhage or infarction. 24 hour results: No results found for this or any previous visit (from the past 24 hour(s)). Assessment:  
 
Active Problems: 
  Hydrocephalus ([facility] (10/1/2020) NPH (normal pressure hydrocephalus) ([facility] (10/1/2020) Plan: 1. NPH 
 - s/p  shunt placement 10/01 
 - normalize and mobilize 
 - PT/OT/Speech 
 - downgrade to regular NSTU status and q4h neuro checks 
 -  does not want the patient to undergo further general anesthesia. Therefore, will hold off on revising intracranial portion of catheter - Restart namenda and aricept 2. Dsylipidemia 
 - Cont statin from home 3. Hypothyroidism 
 - Cont levothyroxine from home DVT ppx: SCDs Dispo: home with home health Plan d/w Dr. Eliza Baez, daughter at bedside, nurse. Will get home health PT/OT/aide and possibly d/c pt to home tomorrow if those services are in place.   
 
 
Miguel Camarillo, YAJAIRA

## 2020-10-05 NOTE — PROGRESS NOTES
Awake alert follows commands  Even talking some  I had several conversations with her  over the weekend, he called me on my cell phone. He does not want his wife to undergo anymore anesthesia and therefore does not want me to revise the shunt despite my recommendation to do so. He also does not want to send her to a SNF, but wants to take her home. Will help arrange home health aides for them. The shunt pumps well. Family understands she may not get any better although she is clearly better than pre op. Home/discharge when home health arranged.

## 2020-10-05 NOTE — PROGRESS NOTES
Problem: Falls - Risk of 
Goal: *Absence of Falls Description: Document Edmonia Morning Fall Risk and appropriate interventions in the flowsheet. Outcome: Progressing Towards Goal 
Note: Fall Risk Interventions: 
Mobility Interventions: Communicate number of staff needed for ambulation/transfer, Patient to call before getting OOB, PT Consult for mobility concerns Mentation Interventions: Adequate sleep, hydration, pain control, Door open when patient unattended, Evaluate medications/consider consulting pharmacy, Family/sitter at bedside, Increase mobility, More frequent rounding, Reorient patient, Room close to nurse's station, Update white board Medication Interventions: Bed/chair exit alarm, Patient to call before getting OOB, Teach patient to arise slowly Elimination Interventions: Call light in reach, Toileting schedule/hourly rounds History of Falls Interventions: Consult care management for discharge planning, Door open when patient unattended, Room close to nurse's station Problem: Patient Education: Go to Patient Education Activity Goal: Patient/Family Education Outcome: Progressing Towards Goal 
  
Problem: Pressure Injury - Risk of 
Goal: *Prevention of pressure injury Description: Document Hari Scale and appropriate interventions in the flowsheet. Outcome: Progressing Towards Goal 
Note: Pressure Injury Interventions: 
Sensory Interventions: Assess changes in LOC, Discuss PT/OT consult with provider, Float heels, Keep linens dry and wrinkle-free, Maintain/enhance activity level, Minimize linen layers, Pressure redistribution bed/mattress (bed type) Moisture Interventions: Absorbent underpads, Check for incontinence Q2 hours and as needed, Internal/External urinary devices, Limit adult briefs, Maintain skin hydration (lotion/cream), Minimize layers Activity Interventions: Increase time out of bed, Pressure redistribution bed/mattress(bed type), PT/OT evaluation Mobility Interventions: Float heels, Pressure redistribution bed/mattress (bed type), PT/OT evaluation, Turn and reposition approx. every two hours(pillow and wedges) Nutrition Interventions: Document food/fluid/supplement intake Friction and Shear Interventions: Lift sheet, Minimize layers, Feet elevated on foot rest 
 
  
 
 
 
  
Problem: Patient Education: Go to Patient Education Activity Goal: Patient/Family Education Outcome: Progressing Towards Goal 
  
Problem: Pain Goal: *Control of Pain Outcome: Progressing Towards Goal 
  
Problem: Patient Education: Go to Patient Education Activity Goal: Patient/Family Education Outcome: Progressing Towards Goal 
  
Problem: General Medical Care Plan Goal: *Vital signs within specified parameters Outcome: Progressing Towards Goal 
Goal: *Labs within defined limits Outcome: Progressing Towards Goal 
Goal: *Absence of infection signs and symptoms Outcome: Progressing Towards Goal 
Goal: *Optimal pain control at patient's stated goal 
Outcome: Progressing Towards Goal 
Goal: *Skin integrity maintained Outcome: Progressing Towards Goal 
Goal: *Fluid volume balance Outcome: Progressing Towards Goal 
Goal: *Optimize nutritional status Outcome: Progressing Towards Goal 
Goal: *Anxiety reduced or absent Outcome: Progressing Towards Goal 
Goal: *Progressive mobility and function (eg: ADL's) Outcome: Progressing Towards Goal

## 2020-10-05 NOTE — PROGRESS NOTES
Problem: Mobility Impaired (Adult and Pediatric) Goal: *Acute Goals and Plan of Care (Insert Text) Description: FUNCTIONAL STATUS PRIOR TO ADMISSION: Patient required moderate assistance for basic and total A for instrumental ADLs.  reporting he assists with bathing, dressing and toileting. Has a cane, walker, W/C and shower chair but patient was not using them consistently PTA. HOME SUPPORT: The patient lived with  who provides assistance.  reports having lots of family and friends to assist as needed. Physical Therapy Goals Initiated 10/2/2020 1. Patient will move from supine to sit and sit to supine  in bed with supervision/set-up within 7 day(s). 2.  Patient will transfer from bed to chair and chair to bed with minimal assistance/contact guard assist using the least restrictive device within 7 day(s). 3.  Patient will perform sit to stand with contact guard assist within 7 day(s). 4.  Patient will ambulate with minimal assistance/contact guard assist for 25 feet with the least restrictive device within 7 day(s). 5.  Patient will ascend/descend 3 stairs with 1 handrail(s) with moderate assistance  within 7 day(s). Outcome: Progressing Towards Goal 
 
PHYSICAL THERAPY TREATMENT Patient: Carlita Mary (72 y.o. female) Date: 10/5/2020 Diagnosis: Hydrocephalus ([facility] [address] [G91.9] NPH (normal pressure hydrocephalus) ([facility] [address] [G91.2] <principal problem not specified> Procedure(s) (LRB): VENTRICULAR-PERITONEAL SHUNT PLACEMENT (GEN SURG TO POST) (N/A) 4 Days Post-Op Precautions:   
Chart, physical therapy assessment, plan of care and goals were reviewed. ASSESSMENT Patient continues with skilled PT services and is progressing towards goals. Pt with flat affect today requiring increased time for verbal responses with questioning - answering yes/no questions appropriately about ~50% of the time.  Daughter was present during treatment to provide information. Overall, pt agreeable to participate in therapy and willing to get to the chair. She displayed delayed initiation of motor tasks, required frequent single step cues for sequencing of motor tasks and overall required Min to Mod A for bed mobility and Max A to ambulation 4ft bed to chair with RW. She had difficulty stepping Rward during transfers and needed both verbal and visual cues to complete. Overall, pt continues to require significant assistance for mobility tasks. I feel that she would be a great candidate for IP Rehab but previous notes states that  wants pt to dc home, if this is the case, she will require constant 1:1 care and physical assistance for all aspects of mobility. Acute PT will continue to follow pt while she remains in the acute setting. Current Level of Function Impacting Discharge (mobility/balance): Max A for transfers with RW. Mod A for transfers Other factors to consider for discharge: requires significant physical assistance for transfers and short distance gait PLAN : 
Patient continues to benefit from skilled intervention to address the above impairments. Continue treatment per established plan of care. to address goals. Recommendation for discharge: (in order for the patient to meet his/her long term goals) Therapy 3 hours per day 5-7 days per week This discharge recommendation: A follow-up discussion with the attending provider and/or case management is planned IF patient discharges home will need the following DME: to be determined (TBD) SUBJECTIVE:  
Patient stated Yes.  OBJECTIVE DATA SUMMARY:  
Critical Behavior: 
Neurologic State: Alert Orientation Level: Oriented to person, Disoriented to situation, Disoriented to place, Disoriented to time Cognition: Follows commands, Memory loss Safety/Judgement: Decreased awareness of environment, Decreased awareness of need for assistance, Decreased awareness of need for safety, Decreased insight into deficits, Fall prevention Functional Mobility Training: 
Bed Mobility: 
  
Supine to Sit: Minimum assistance Scooting: Moderate assistance Transfers: 
Sit to Stand: Moderate assistance Stand to Sit: Moderate assistance Balance: 
Sitting: Impaired Sitting - Static: Good (unsupported) Sitting - Dynamic: Fair (occasional) Standing: Impaired Standing - Static: Fair;Constant support Standing - Dynamic : Fair;Constant support Ambulation/Gait Training: 
Distance (ft): 4 Feet (ft) Assistive Device: Walker, rolling;Gait belt Ambulation - Level of Assistance: Maximum assistance Gait Description (WDL): Exceptions to [facility] Base of Support: Narrowed Speed/Amy: Delayed Pain Rating: 
Denied pain Activity Tolerance:  
Fair After treatment patient left in no apparent distress:  
Sitting in chair, Bed / chair alarm activated, and Caregiver / family present COMMUNICATION/COLLABORATION:  
The patients plan of care was discussed with: Registered nurse. Racquel Watts PT, DPT Time Calculation: 30 mins

## 2020-10-05 NOTE — PROGRESS NOTES
Bedside and Verbal shift change report given to [address] (oncoming nurse) by Geneva Aaron (offgoing nurse). Report included the following information SBAR, Kardex, MAR, Recent Results, Cardiac Rhythm NSR and Dual Neuro Assessment.

## 2020-10-05 NOTE — ROUTINE PROCESS
Bedside shift change report given to Tenisha (oncoming nurse) by Kenrick Denton (offgoing nurse). Report included the following information SBAR, Kardex, Recent Results and Cardiac Rhythm NSR.

## 2020-10-05 NOTE — PROGRESS NOTES
TRANSITION OF CARE PLAN  
RUR 7% LOW RISK DISPOSITION--Return home with Home Health (*Pending referral to [facility]) TRANSPORT--Family Chart reviewed. Ongoing medical management. Expected to discharge tomorrow 10/6 pending medical stability. Therapy team evaluated and recommended Home Health. Met with patient and her daughter at bedside to discuss discharge plan. Patient used [facility] [phone] in the past. Patient is in agreeable with plan. Call placed and spoke with  [facility])  to initiate services. Fax referral to [phone]. Fax confirmation received. Medicare pt has received, reviewed, and signed 2nd IM letter informing them of their right to appeal the discharge. Signed copied has been placed on pt bedside chart. CM to follow. Filemon Brumfield, MSW,CRM

## 2020-10-06 NOTE — DISCHARGE INSTRUCTIONS
Patient Education        Ventriculoperitoneal Shunt Surgery: What to Expect at Home  Your Recovery  Ventriculoperitoneal shunt surgery ( shunt surgery) helps control pressure in your brain by draining extra fluid out of your brain and into your belly. During  shunt surgery, the doctor placed two small tubes (catheters) and a valve under your skin. After surgery, your neck or belly may feel tender. You will probably feel tired, but you should not have much pain. For a few weeks after surgery, you may have headaches. It's common to feel some fluid moving around in your scalp. This will go away as your scalp heals. The area around the stitches or staples may feel tender for a week or more. If needed, the doctor will remove your stitches or staples 5 to 10 days after surgery. The shunt will not limit your activities. There will be a lump on your head where the valve is. This lump may not show when your hair grows back. You may or may not feel the shunt underneath your skin. In some cases, your doctor may need to adjust your shunt valve so the right amount of fluid is draining. Watch for signs of infection or signs that the shunt is not working right. If the shunt gets infected or stops working well, it may need to be removed or replaced. Without problems, your shunt may be left in place for years. This care sheet gives you a general idea about how long it will take for you to recover. But each person recovers at a different pace. Follow the steps below to get better as quickly as possible. How can you care for yourself at home? Activity    · Rest when you feel tired.  Getting enough sleep will help you recover.     · Do not touch the valve on your head.     · It is okay for you to lie on the side of your head with the shunt.     · For 6 weeks, do not do any activity that may cause you to hit your head.     · You will probably be able to return to work in less than 1 week.     · After your doctor says it is okay to remove the bandages, you can shower. Afterward, be sure to pat the incision areas dry.     · Do not swim or bathe until your stitches or staples are removed.     · Check with your doctor about when it is safe to travel by plane. Diet    · The tube in your belly will not affect how you digest food. You can eat as usual. If your stomach is upset, try bland, low-fat foods like plain rice, broiled chicken, toast, and yogurt.     · You may notice that your bowel movements are not regular right after your surgery. This is common. Try to avoid constipation and straining with bowel movements. You may want to take a fiber supplement every day. If you have not had a bowel movement after a couple of days, ask your doctor about taking a mild laxative. Medicines    · Your doctor will tell you if and when you can restart your medicines. You will also get instructions about taking any new medicines.     · If you take aspirin or some other blood thinner, ask your doctor if and when to start taking it again. Make sure that you understand exactly what your doctor wants you to do.     · Be safe with medicines. Take pain medicines exactly as directed. ? If the doctor gave you a prescription medicine for pain, take it as prescribed. ? If you are not taking a prescription pain medicine, ask your doctor if you can take an over-the-counter medicine. ? Do not take two or more pain medicines at the same time unless the doctor told you to. Many pain medicines have acetaminophen, which is Tylenol. Too much acetaminophen (Tylenol) can be harmful.     · If you think your pain medicine is making you sick to your stomach:  ? Take your medicine after meals (unless your doctor has told you not to). ? Ask your doctor for a different pain medicine.     · If your doctor prescribed antibiotics, take them as directed. Do not stop taking them just because you feel better. You need to take the full course of antibiotics.    Incision care    · If you have strips of tape on the incisions the doctor made, leave the tape on for a week or until it falls off.     · Wash your incision areas daily with warm, soapy water, and gently pat them dry. Don't use hydrogen peroxide or alcohol, which can slow healing. You may cover the areas with a gauze bandage if they weep or rub against clothing. Change the bandages every day.     · Keep the areas clean and dry. Follow-up care is a key part of your treatment and safety. Be sure to make and go to all appointments, and call your doctor if you are having problems. It's also a good idea to know your test results and keep a list of the medicines you take. When should you call for help? Call 911 anytime you think you may need emergency care. For example, call if:    · You passed out (lost consciousness).     · You have sudden chest pain and shortness of breath, or you cough up blood.     · You have severe trouble breathing.     · It is hard to think, move, speak, or see.     · Your body is jerking or shaking. Call your doctor now or seek immediate medical care if:    · You feel new bumps on your head 3 to 5 days after surgery or the bumps get bigger after 2 weeks.     · There is redness or swelling along the shunt.     · You have trouble thinking clearly.     · You have a fever with a stiff neck or a severe headache.     · Your incision comes open.     · You have signs of infection, such as:  ? Increased pain, swelling, warmth, or redness. ? Red streaks leading from the incision. ? Pus draining from the incision. ? Swollen lymph nodes in your neck, armpits, or groin. ? A fever.     · You have any sudden vision changes.     · You have new or worse headaches.     · You are sleeping more than you are awake.     · You fall and hit your head.     · You have pain that does not get better after you take pain medicine.     · You have a fever over 100°F.     · You throw up.    Watch closely for changes in your health, and be sure to contact your doctor if you have any problems. Where can you learn more? Go to http://www.gray.com/  Enter V872 in the search box to learn more about \"Ventriculoperitoneal Shunt Surgery: What to Expect at Home. \"  Current as of: May 27, 2020               Content Version: 12.6  © 2006-2020 [employer]. Care instructions adapted under license by [employer] (which disclaims liability or warranty for this information). If you have questions about a medical condition or this instruction, always ask your healthcare professional. [address] any warranty or liability for your use of this information.

## 2020-10-06 NOTE — ROUTINE PROCESS
Hospital follow-up PCP transitional care appointment has been scheduled with NP [facility] for Oct 22 @ 1PM (earliest available). Pending patient discharge. Vane Sesay, Care Management Specialist. 
 
Attempted to schedule specialty [facility] appt with Dr. Fidencio Bolanos , however the  transferred me back to the nurse. The nurse line was busy and would not allow for a message to be left. [employer] does not service the patients home address.

## 2020-10-06 NOTE — PROGRESS NOTES
I have reviewed discharge instructions with the patient and caregiver. The patient and caregiver verbalized understanding. PIV and telemetry discontinued. Pt discharged to home via family.

## 2020-10-06 NOTE — PROGRESS NOTES
Problem: Dysphagia (Adult) Goal: *Acute Goals and Plan of Care (Insert Text) Description: Speech Therapy Goals Initiated 10/2/2020 1. Patient will tolerate regular  diet with thin liquids without signs/symptoms of aspiration given no cues within 7 day(s). 3.  Patient and family training In prep for discharge Outcome: Progressing Towards Goal 
Note: Goal Met New goal: Patient will tolerate regular diet, thin liquids free of s/s of aspiration within 7 days SPEECH LANGUAGE PATHOLOGY DYSPHAGIA TREATMENT Patient: Terra Merino (42 y.o. female) Date: 10/6/2020 Diagnosis: Hydrocephalus ([facility] [address] [G91.9] NPH (normal pressure hydrocephalus) ([facility] [address] [G91.2] <principal problem not specified> Procedure(s) (LRB): VENTRICULAR-PERITONEAL SHUNT PLACEMENT (GEN SURG TO POST) (N/A) 5 Days Post-Op Precautions: fall ASSESSMENT: 
Patient seated in chair, fed by self and daughter. Good tolerance of dysphagia 2 diet, thin liquids. Discussed diet options with patient/daughter. Daughter feels she is ready for advancement. Patient tolerated trials of regular solids with prolonged manipulation but functional oral clearance. Her daughter appropriately waits to provide more food until she has cleared her oral cavity of the initial bite. Discussed safe swallow precautions, single bites and sips, check oral cavity at completion of meal, providing soft moist foods if patient seems more drowsy to avoid fatigue during meals. Her daughter verbalized understanding. PLAN: 
Recommendations and Planned Interventions: 
Regular/thins Patient continues to benefit from skilled intervention to address the above impairments. Continue treatment per established plan of care. Discharge Recommendations: To Be Determined SUBJECTIVE:  
Daughter reports, \"She probably needs more solid food\". OBJECTIVE:  
Cognitive and Communication Status: 
Neurologic State: Alert Orientation Level: Oriented to person, Disoriented to time, Disoriented to situation, Disoriented to place Cognition: (Minimall verbalizations. Daughter present) Perception: Cues to maintain midline in standing, Tactile, Verbal, Cues to attend to left side of body, Cues to attend to right side of body Perseveration: No perseveration noted Safety/Judgement: Decreased awareness of environment, Decreased awareness of need for assistance, Decreased awareness of need for safety, Decreased insight into deficits, Fall prevention Dysphagia Treatment: 
Oral Assessment: 
Oral Assessment Oral Hygiene: moist, clean Mandible: No impairment P.O. Trials: 
Patient Position: seated in chair Vocal quality prior to P.O.: Low volume Consistency Presented: Thin liquid; Solid;Puree How Presented: Self-fed/presented(Fed by daughter) Bolus Acceptance: No impairment Bolus Formation/Control: (Prolonged) Oral Residue: None Aspiration Signs/Symptoms: None Pain: 
Pain Scale 1: Numeric (0 - 10) Pain Intensity 1: 1 Pain Location 1: Leg After treatment:  
Patient left in no apparent distress sitting up in chair, Call bell within reach, and Caregiver / family present COMMUNICATION/EDUCATION:  
Patient was educated regarding diet options and safe swallow precautions. Daughter verbalized understanding. Patient with flat affect and minimal verbalizations. The patient's plan of care including recommendations, planned interventions, and recommended diet changes were discussed with: Registered nurse. Tony Interiano SLP Time Calculation: 26 mins

## 2020-10-06 NOTE — PROGRESS NOTES
Bedside and Verbal shift change report given to Gilberto Hobson RN (oncoming nurse) by Jaci Vieira RN (offgoing nurse). Report included the following information SBAR, Kardex, Intake/Output, MAR, Recent Results, Cardiac Rhythm NSR and Dual Neuro Assessment.

## 2020-10-06 NOTE — PROGRESS NOTES
Problem: Falls - Risk of 
Goal: *Absence of Falls Description: Document Roche Kenneth Fall Risk and appropriate interventions in the flowsheet. Outcome: Progressing Towards Goal 
Note: Fall Risk Interventions: 
Mobility Interventions: Bed/chair exit alarm, OT consult for ADLs, Patient to call before getting OOB, PT Consult for mobility concerns, PT Consult for assist device competence, Utilize walker, cane, or other assistive device Mentation Interventions: Adequate sleep, hydration, pain control, Bed/chair exit alarm, Door open when patient unattended, Family/sitter at bedside, Eyeglasses and hearing aids, Increase mobility, More frequent rounding, Reorient patient, Room close to nurse's station, Toileting rounds, Update white board Medication Interventions: Bed/chair exit alarm, Patient to call before getting OOB, Teach patient to arise slowly Elimination Interventions: Bed/chair exit alarm, Call light in reach, Toileting schedule/hourly rounds History of Falls Interventions: Bed/chair exit alarm, Consult care management for discharge planning, Door open when patient unattended, Room close to nurse's station Problem: Patient Education: Go to Patient Education Activity Goal: Patient/Family Education Outcome: Progressing Towards Goal 
  
Problem: Pressure Injury - Risk of 
Goal: *Prevention of pressure injury Description: Document Hari Scale and appropriate interventions in the flowsheet. Outcome: Progressing Towards Goal 
Note: Pressure Injury Interventions: 
Sensory Interventions: Assess changes in LOC, Discuss PT/OT consult with provider, Float heels, Keep linens dry and wrinkle-free, Maintain/enhance activity level, Minimize linen layers, Pressure redistribution bed/mattress (bed type), Turn and reposition approx. every two hours (pillows and wedges if needed) Moisture Interventions: Absorbent underpads, Check for incontinence Q2 hours and as needed, Internal/External urinary devices, Limit adult briefs, Maintain skin hydration (lotion/cream), Minimize layers, Moisture barrier Activity Interventions: Increase time out of bed, Pressure redistribution bed/mattress(bed type), PT/OT evaluation Mobility Interventions: Float heels, HOB 30 degrees or less, Pressure redistribution bed/mattress (bed type), PT/OT evaluation, Turn and reposition approx. every two hours(pillow and wedges) Nutrition Interventions: Document food/fluid/supplement intake Friction and Shear Interventions: Feet elevated on foot rest, Lift sheet, Lift team/patient mobility team, Minimize layers Problem: Patient Education: Go to Patient Education Activity Goal: Patient/Family Education Outcome: Progressing Towards Goal 
  
Problem: Pain Goal: *Control of Pain Outcome: Progressing Towards Goal 
  
Problem: Discharge Planning Goal: *Discharge to safe environment Outcome: Progressing Towards Goal 
  
Problem: Patient Education: Go to Patient Education Activity Goal: Patient/Family Education Outcome: Progressing Towards Goal 
  
Problem: General Medical Care Plan Goal: *Vital signs within specified parameters Outcome: Progressing Towards Goal 
Goal: *Labs within defined limits Outcome: Progressing Towards Goal 
Goal: *Absence of infection signs and symptoms Outcome: Progressing Towards Goal 
Goal: *Optimal pain control at patient's stated goal 
Outcome: Progressing Towards Goal 
Goal: *Skin integrity maintained Outcome: Progressing Towards Goal 
Goal: *Fluid volume balance Outcome: Progressing Towards Goal 
Goal: *Optimize nutritional status Outcome: Progressing Towards Goal 
Goal: *Anxiety reduced or absent Outcome: Progressing Towards Goal 
Goal: *Progressive mobility and function (eg: ADL's) Outcome: Progressing Towards Goal

## 2020-10-06 NOTE — PROGRESS NOTES
Problem: Falls - Risk of 
Goal: *Absence of Falls Description: Document Veatrice Marker Fall Risk and appropriate interventions in the flowsheet. Outcome: Progressing Towards Goal 
Note: Fall Risk Interventions: 
Mobility Interventions: Communicate number of staff needed for ambulation/transfer, PT Consult for assist device competence, OT consult for ADLs, Patient to call before getting OOB, PT Consult for mobility concerns Mentation Interventions: Adequate sleep, hydration, pain control, Door open when patient unattended, Evaluate medications/consider consulting pharmacy, Reorient patient, More frequent rounding, Room close to nurse's station Medication Interventions: Bed/chair exit alarm, Evaluate medications/consider consulting pharmacy, Patient to call before getting OOB, Teach patient to arise slowly Elimination Interventions: Call light in reach, Elevated toilet seat, Patient to call for help with toileting needs, Stay With Me (per policy), Toilet paper/wipes in reach, Bed/chair exit alarm History of Falls Interventions: Consult care management for discharge planning, Door open when patient unattended, Evaluate medications/consider consulting pharmacy, Room close to nurse's station Problem: Patient Education: Go to Patient Education Activity Goal: Patient/Family Education Outcome: Progressing Towards Goal 
  
Problem: Patient Education: Go to Patient Education Activity Goal: Patient/Family Education Outcome: Progressing Towards Goal 
  
Problem: Pain Goal: *Control of Pain Outcome: Progressing Towards Goal 
  
Problem: Discharge Planning Goal: *Discharge to safe environment Outcome: Progressing Towards Goal

## 2020-10-06 NOTE — DISCHARGE SUMMARY
Discharge Summary Patient ID: 
Jeff Rodriguez 
[id number]  
48 y.o. 
1955 Admit date: 10/1/2020 Discharge Date: 10/6/2020 Admitting Physician: Soo Loyola MD  
 
Discharge Physician: Nik Gr NP Admission Diagnoses: Hydrocephalus ([address] [G91.9] NPH (normal pressure hydrocephalus) ([address] [G91.2] Last Procedure: Procedure(s): VENTRICULAR-PERITONEAL SHUNT PLACEMENT (GEN SURG TO POST) Discharge Diagnoses: Active Problems: 
  Hydrocephalus ([address] (10/1/2020) NPH (normal pressure hydrocephalus) ([address] (10/1/2020) Consults: None Significant Diagnostic Studies: 1. CT head without contrast on 03/17/2020 shows no evidence of intracranial hemorrhage. Evidence of ventricular prominence. 2. CT head without contrast on 10/02/2020 shows recent right occipital approach ventriculostomy catheter placement, with tip near the inferior right basal ganglia as described. Correlate with surgical findings/line function. Mild ventriculomegaly similar to prior study. No acute intracranial hemorrhage or infarction. Patient condition upon discharge: Stable Hospital Course: Pt with history of dementia. Admitted for  shunt placement for NPH. Underwent procedure on 10/01/2020. Seemed to be taking more this morning. During surgery, had good flow of CSF from both ends of the catheter. Intraoperative findings can be found in Dr. Wicho Quick note. Post-operatively, the patient transferred to [facility]. She was talking more. Her post-op head CT showed the intraventricular portion of the catheter wasn't in an ideal position despite being good flow of CSF during surgery. Dr. Yohannes Salvador discussed repositioning the catheter with the patient's , but he declined, stating he did not wish to put her under any further general anesthesia at this point. The patient was afebrile and vitals signs were stable. She was taking PO well.  Inpatient rehab was recommended by PT/OT, but the patient's family wished to take her home with home health PT/OT and an aide instead. She will follow-up with Dr. Gladys Claros next week for staple removal. She was discharged on POD4. Disposition: Home Patient Instructions:  
Current Discharge Medication List  
  
START taking these medications Details  
acetaminophen (TYLENOL) 325 mg tablet Take 2 Tabs by mouth every four (4) hours as needed for Pain. Qty: 10 Tab, Refills: 0 HYDROcodone-acetaminophen (NORCO) 5-325 mg per tablet Take 1 Tab by mouth every four (4) hours as needed for Pain for up to 3 days. Max Daily Amount: 6 Tabs. Qty: 12 Tab, Refills: 0 Associated Diagnoses: S/P  shunt CONTINUE these medications which have NOT CHANGED Details  
letrozole (FEMARA) 2.5 mg tablet Take 2.5 mg by mouth daily. !! OTHER 0.5 Tabs every other day. BLOOD BUILDER SUPPLEMENT  
  
pravastatin (PRAVACHOL) 10 mg tablet take 1 tablet by mouth at bedtime 
Qty: 30 Tab, Refills: 6 Associated Diagnoses: Hyperlipidemia, unspecified hyperlipidemia type  
  
levothyroxine (SYNTHROID) 25 mcg tablet Take 1 Tab by mouth Daily (before breakfast). Qty: 90 Tab, Refills: 0  
  
calcium-cholecalciferol, D3, (CALTRATE 600+D) tablet Take 1 Tab by mouth two (2) times a day. multivitamin with iron tablet Take 1 Tab by mouth daily. omega 3-dha-epa-fish oil (FISH OIL) 100-160-1,000 mg cap Take 1 Cap by mouth every other day. donepezil (ARICEPT) 10 mg tablet Take 1 Tab by mouth nightly. Qty: 30 Tab, Refills: 11  
 Associated Diagnoses: Alzheimer's dementia without behavioral disturbance, unspecified timing of dementia onset ([address] memantine (NAMENDA) 10 mg tablet take 1 tablet by mouth twice a day 
Qty: 60 Tab, Refills: 11  
 Associated Diagnoses: Alzheimer's dementia without behavioral disturbance, unspecified timing of dementia onset ([address] diphenhydrAMINE (BENADRYL) 25 mg tablet Take 50 mg by mouth nightly. !! OTHER Home health to evaluate and treat for generalized weakness. Qty: 1 Each, Refills: 0 Associated Diagnoses: Early onset Alzheimer's dementia without behavioral disturbance ([facility] Acoustic neuroma ([facility] Weakness generalized  
  
 !! - Potential duplicate medications found. Please discuss with provider. Diet: Reference my discharge instructions. Activity: Reference my discharge instructions. EXAM:  
Gen:NAD. Neuro: Awake. Oriented to self. Unable to tell me place or year. Follows simple commands. Speech clear. Affect flat. PERRL. Face symmetric. DOOLEY spontaneously. Negative drift. Gait deferred. Skin: Scalp dressing C/D/I. Valve pumps and refills. Abdominal incisions intact with dermabond in place Total time discharging patient took greater than 30 minutes. Signed: 
Mami Benson NP October 8, 2020 
[id number]

## 2020-10-22 NOTE — PATIENT INSTRUCTIONS
Medicare Wellness Visit, Female The best way to live healthy is to have a lifestyle where you eat a well-balanced diet, exercise regularly, limit alcohol use, and quit all forms of tobacco/nicotine, if applicable. Regular preventive services are another way to keep healthy. Preventive services (vaccines, screening tests, monitoring & exams) can help personalize your care plan, which helps you manage your own care. Screening tests can find health problems at the earliest stages, when they are easiest to treat. [facility] follows the current, evidence-based guidelines published by the [facility] Palomino ([facility]) when recommending preventive services for our patients. Because we follow these guidelines, sometimes recommendations change over time as research supports it. (For example, mammograms used to be recommended annually. Even though Medicare will still pay for an annual mammogram, the newer guidelines recommend a mammogram every two years for women of average risk). Of course, you and your doctor may decide to screen more often for some diseases, based on your risk and your co-morbidities (chronic disease you are already diagnosed with). Preventive services for you include: - Medicare offers their members a free annual wellness visit, which is time for you and your primary care provider to discuss and plan for your preventive service needs. Take advantage of this benefit every year! 
-All adults over the age of 72 should receive the recommended pneumonia vaccines. Current USPSTF guidelines recommend a series of two vaccines for the best pneumonia protection.  
-All adults should have a flu vaccine yearly and a tetanus vaccine every 10 years.  
-All adults age 48 and older should receive the shingles vaccines (series of two vaccines). -All adults age 38-68 who are overweight should have a diabetes screening test once every three years. -All adults born between [birth year] and 1963 should be screened once for Hepatitis C. 
-Other screening tests and preventive services for persons with diabetes include: an eye exam to screen for diabetic retinopathy, a kidney function test, a foot exam, and stricter control over your cholesterol.  
-Cardiovascular screening for adults with routine risk involves an electrocardiogram (ECG) at intervals determined by your doctor.  
-Colorectal cancer screenings should be done for adults age 54-65 with no increased risk factors for colorectal cancer. There are a number of acceptable methods of screening for this type of cancer. Each test has its own benefits and drawbacks. Discuss with your doctor what is most appropriate for you during your annual wellness visit. The different tests include: colonoscopy (considered the best screening method), a fecal occult blood test, a fecal DNA test, and sigmoidoscopy. 
 
-A bone mass density test is recommended when a woman turns 65 to screen for osteoporosis. This test is only recommended one time, as a screening. Some providers will use this same test as a disease monitoring tool if you already have osteoporosis. -Breast cancer screenings are recommended every other year for women of normal risk, age 54-69. 
-Cervical cancer screenings for women over age 72 are only recommended with certain risk factors. Here is a list of your current Health Maintenance items (your personalized list of preventive services) with a due date: 
Health Maintenance Due Topic Date Due  Shingles Vaccine (1 of 2) 12/03/2007  Pap Test  08/09/2020 [address] [place] [address] Annual Well Visit  08/18/2020  Yearly Flu Vaccine (1) 09/01/2020

## 2020-10-22 NOTE — PROGRESS NOTES
Room:  
 
Identified pt with two pt identifiers(name and ). Reviewed record in preparation for visit and have obtained necessary documentation. All patient medications has been reviewed. Chief Complaint Patient presents with  
[facility] Follow Up Health Maintenance Due Topic  Shingrix Vaccine Age 50> (1 of 2)  PAP AKA CERVICAL CYTOLOGY  Medicare Yearly Exam   
 Flu Vaccine (1) Vitals:  
 10/22/20 1317 BP: 114/73 Pulse: 97 Resp: 16 Temp: 98 °F (36.7 °C) TempSrc: Skin SpO2: 97% Weight: 150 lb (68 kg) Height: 5' 6\" (1.676 m) PainSc:   0 - No pain 4. Have you been to the ER, urgent care clinic since your last visit? Hospitalized since your last visit? yes [facility] 
 
5. Have you seen or consulted any other health care providers outside of the [address] [place] [address] since your last visit? Include any pap smears or colon screening. no 
 
 
 
 
 
Patient is accompanied by family I have received verbal consent from Terra Merino to discuss any/all medical information while they are present in the room.

## 2020-10-22 NOTE — PROGRESS NOTES
Subjective: Chief Complaint Patient presents with  
[facility] Follow Up  
  
 
HPI: 
60 y.o.  presents for follow up appointment, here today with her  and sister. Was recently admitted to [facility]-  
 
Admit date: 10/1/2020 Discharge Date: 10/6/2020    
Admitting Physician: Andi Mclaughlin MD  
Discharge Physician: Shaniqua Rodriguez NP 
  
Admission Diagnoses: Hydrocephalus [facility]) [G91.9] NPH (normal pressure hydrocephalus) ([facility] [G91.2] 
  
Last Procedure: Procedure(s): VENTRICULAR-PERITONEAL SHUNT PLACEMENT (GEN SURG TO POST) 
  
Discharge Diagnoses: Active Problems: 
  Hydrocephalus ([facility] [facility] (10/1/2020) 
  
  NPH (normal pressure hydrocephalus) ([facility] [facility] (10/1/2020)   
  
Consults: None 
  
Significant Diagnostic Studies: 1. CT head without contrast on 03/17/2020 shows no evidence of intracranial hemorrhage. Evidence of ventricular prominence. 2. CT head without contrast on 10/02/2020 shows recent right occipital approach ventriculostomy catheter placement, with tip near the inferior right basal ganglia as described. Correlate with surgical findings/line function. Mild ventriculomegaly similar to prior study. No acute intracranial hemorrhage or infarction. 
  
Patient condition upon discharge: Stable 
  
Hospital Course: Pt with history of dementia. Admitted for  shunt placement for NPH. Underwent procedure on 10/01/2020. Seemed to be taking more this morning. During surgery, had good flow of CSF from both ends of the catheter. Intraoperative findings can be found in Dr. Woody Garcias note.  
  
Post-operatively, the patient transferred to [facility]. She was talking more. Her post-op head CT showed the intraventricular portion of the catheter wasn't in an ideal position despite being good flow of CSF during surgery.  Dr. Frida Stallworth discussed repositioning the catheter with the patient's , but he declined, stating he did not wish to put her under any further general anesthesia at this point. The patient was afebrile and vitals signs were stable. She was taking PO well. Inpatient rehab was recommended by PT/OT, but the patient's family wished to take her home with home health PT/OT and an aide instead. She will follow-up with Dr. Frida Stallworth next week for staple removal. She was discharged on POD4. 
  
 
 
Since home, she has had [facility] coming to her house 1-2 times per week. Now just PT but  thinks that they told him she only had one more session, which he thinks she needs more because she is still pretty weak. She is eating and drinking well, no falls.  thinks that her balance is much better since the  shunt placed but still weaker on her right side. She is not walking alone, still requires assistance and walker. She is otherwise in the wheelchair. She had follow up with Dr Frida Stallworth (neuro surgery) last week and he took staples out of her head, told he thought she was healing fine. She has upcoming head CT on 11/6 and follow up with him next on 11/13. She is alert and at baseline cognitively per . Has only complained of headache a couple times per . No hospital, ER or specialist visits since last primary care visit except as noted above. Past Medical History:  
Diagnosis Date  Acoustic neuroma ([facility]  Breast cancer ([facility] 2008 Left  Breast cancer ([facility] 07/2019 RIGHT  Communicating hydrocephalus ([facility]  Hyperlipidemia 6/30/2017  Memory disorder  Radiation therapy complication Lt breast 2008--no chemo  Thyroid disease HYPOTHYROID Social History Tobacco Use  Smoking status: Never Smoker  Smokeless tobacco: Never Used Substance Use Topics  Alcohol use: No  
 Drug use: No  
 
 
Outpatient Medications Marked as Taking for the 10/22/20 encounter (Office Visit) with Emmett Middleton NP Medication Sig Dispense Refill  memantine (NAMENDA) 10 mg tablet TAKE 1 TABLET BY MOUTH TWICE A  Tab 3  
 levothyroxine (SYNTHROID) 25 mcg tablet Take 1 Tab by mouth Daily (before breakfast). 90 Tab 0  
 letrozole (FEMARA) 2.5 mg tablet Take 2.5 mg by mouth daily.  diphenhydrAMINE (BENADRYL) 25 mg tablet Take 50 mg by mouth nightly.  pravastatin (PRAVACHOL) 10 mg tablet take 1 tablet by mouth at bedtime 30 Tab 6  calcium-cholecalciferol, D3, (CALTRATE 600+D) tablet Take 1 Tab by mouth two (2) times a day.  multivitamin with iron tablet Take 1 Tab by mouth daily.  omega 3-dha-epa-fish oil (FISH OIL) 100-160-1,000 mg cap Take 1 Cap by mouth every other day.  donepezil (ARICEPT) 10 mg tablet Take 1 Tab by mouth nightly. 30 Tab 11 Allergies Allergen Reactions  Pcn [Penicillins] Hives Health Maintenance reviewed ROS: 
Gen: no fatigue, no fever, no chills, no unexplained weight loss or weight gain Eyes: no excessive tearing, itching, or discharge Nose: no rhinorrhea, no sinus pain Mouth: no oral lesions, no sore throat, no difficulty swallowing Resp: no shortness of breath, no wheezing, no cough CV: no chest pain, no orthopnea, no paroxysmal nocturnal dyspnea, no lower extremity edema, no palpitations Abd: no nausea, no heartburn, no diarrhea, no constipation, no abdominal pain Neuro: no syncope or presyncopal episodes Endo: no polyuria, no polydipsia. : no hematuria, no dysuria, no frequency, no incontinence Heme: no lymphadenopathy, no easy bruising or bleeding, no night sweats MSK: no joint pain or swelling PE: 
Visit Vitals /73 (BP 1 Location: Left arm, BP Patient Position: At rest) Pulse 97 Temp 98 °F (36.7 °C) (Skin) Resp 16 Ht 5' 6\" (1.676 m) Wt 150 lb (68 kg) LMP  (LMP Unknown) SpO2 97% BMI 24.21 kg/m² Gen: alert, oriented to self, no acute distress, sitting in wheelchair today. Head: normocephalic, atraumatic. Surgical incision healing well, no signs of infection or swelling. Ears: external auditory canals clear, TMs without erythema or effusion Eyes: pupils equal round reactive to light, sclera clear, conjunctiva clear Oral: moist mucus membranes, no oral lesions, no pharyngeal inflammation or exudate Neck: symmetric normal sized thyroid, no carotid bruits, no jugular vein distention Resp: no increase work of breathing, lungs clear to ausculation bilaterally, no wheezing, rales or rhonchi CV: S1, S2 normal.  No murmurs, rubs, or gallops. Abd: soft, not tender, not distended. No hepatosplenomegaly. Normal bowel sounds. No hernias. No abdominal or renal bruits. Neuro: cranial nerves intact, normal strength and movement in all extremities. Skin: no lesion or rash Extremities: no cyanosis or edema No results found for this visit on 10/22/20. Assessment/Plan: 
Differential diagnosis and treatment options reviewed with patient who is in agreement with treatment plan as outlined below. ICD-10-CM ICD-9-CM 1. Medicare annual wellness visit, subsequent  Z00.00 V70.0 2. Screening for alcoholism  Z13.39 V79.1 3. Unsteady gait  R26.81 781.2 4. Weakness generalized  R53.1 780.79   
5. Malignant neoplasm of upper-outer quadrant of right breast in female, estrogen receptor negative (HCC)  C50.411 174.4   
 Z17.1 V86.1 6. [facility], unspecified type [facility]  G91.9 331.4 7. S/P  shunt  Z98.2 V45.2 8. Hospital discharge follow-up  Z09 V67.59   
9. At risk for falls  Z91.81 V15.88 She appears to be stable since discharge. No fevers, no issues that are concerning per . Discussed fall risk and prevention of falls. Will follow up with neurosurgery as planned. Discussed signs and symptoms or  shunt fail or infection that would require emergent care. She will follow up with her oncologist late November as planned. Will continue PT. If home health PT expires, will order PT outpatient.  will let me know. She is not left alone now,  or her sister are with her at all times. She appears in good spirits today. Discussed BMI and healthy weight. Encouraged patient to work to implement changes including diet high in raw fruits and vegetables, lean protein and good fats. Limit refined, processed carbohydrates and sugar. Encouraged regular exercise. Follow up three months or sooner if needed. I have discussed the diagnosis with the patient and the intended plan as seen in the above orders. The patient has received an after-visit summary and questions were answered concerning future plans. I have discussed medication side effects and warnings with the patient as well. The patient verbalizes understanding and agreement with the plan. This is the Subsequent Medicare Annual Wellness Exam, performed 12 months or more after the Initial AWV or the last Subsequent AWV I have reviewed the patient's medical history in detail and updated the computerized patient record. History Patient Active Problem List  
Diagnosis Code  Ductal carcinoma in situ (DCIS) of left breast D05.12  
 Hyperlipidemia E78.5  Early onset Alzheimer's dementia without behavioral disturbance (HCC) G30.0, F02.80  Vestibular schwannoma ([address] D33.3  Hydrocephalus (HCC) G91.9  
 NPH (normal pressure hydrocephalus) (HCC) G91.2 Past Medical History:  
Diagnosis Date  Acoustic neuroma ([address]  Breast cancer ([address] 2008 Left  Breast cancer ([address] 07/2019 RIGHT  Communicating hydrocephalus ([address]  Hyperlipidemia 6/30/2017  Memory disorder  Radiation therapy complication Lt breast 2008--no chemo  Thyroid disease HYPOTHYROID Past Surgical History:  
Procedure Laterality Date  HX BREAST LUMPECTOMY Left 2008  HX BREAST LUMPECTOMY Right 6/27/2019 RIGHT BREAST LUMPECTOMY, RIGHT BREAST SENTINEL NODE BIOPSY performed by Karlie Simon MD at [facility] OR  
 [place] [id number] [facility] W/CLIP AND SPECIMEN Left 2008  
 multiple areas sampled--one area breast CA Current Outpatient Medications Medication Sig Dispense Refill  memantine (NAMENDA) 10 mg tablet TAKE 1 TABLET BY MOUTH TWICE A  Tab 3  
 levothyroxine (SYNTHROID) 25 mcg tablet Take 1 Tab by mouth Daily (before breakfast). 90 Tab 0  
 letrozole (FEMARA) 2.5 mg tablet Take 2.5 mg by mouth daily.  diphenhydrAMINE (BENADRYL) 25 mg tablet Take 50 mg by mouth nightly.  pravastatin (PRAVACHOL) 10 mg tablet take 1 tablet by mouth at bedtime 30 Tab 6  calcium-cholecalciferol, D3, (CALTRATE 600+D) tablet Take 1 Tab by mouth two (2) times a day.  multivitamin with iron tablet Take 1 Tab by mouth daily.  omega 3-dha-epa-fish oil (FISH OIL) 100-160-1,000 mg cap Take 1 Cap by mouth every other day.  donepezil (ARICEPT) 10 mg tablet Take 1 Tab by mouth nightly. 30 Tab 11  
 acetaminophen (TYLENOL) 325 mg tablet Take 2 Tabs by mouth every four (4) hours as needed for Pain. 10 Tab 0  
 OTHER 0.5 Tabs every other day. BLOOD BUILDER SUPPLEMENT    
 OTHER Home health to evaluate and treat for generalized weakness. 1 Each 0 Allergies Allergen Reactions  Pcn [Penicillins] Hives Family History Problem Relation Age of Onset  Lung Disease Brother  Breast Cancer Sister 43  Cancer Sister BREAST CANCER  Anesth Problems Neg Hx Social History Tobacco Use  Smoking status: Never Smoker  Smokeless tobacco: Never Used Substance Use Topics  Alcohol use: No  
 
 
Depression Risk Factor Screening:  
 
3 most recent PHQ Screens 10/22/2020 PHQ Not Done - Little interest or pleasure in doing things Not at all Feeling down, depressed, irritable, or hopeless Not at all Total Score PHQ 2 0  
 
 
 Alcohol Risk Screen Do you average more than 1 drink per night or more than 7 drinks a week:  No 
 
On any one occasion in the past three months have you have had more than 3 drinks containing alcohol:  No 
 
 
 
Functional Ability and Level of Safety:  
Hearing: Hearing is good. Activities of Daily Living: The home contains: handrails and grab bars wheelchair, shower chair, toilet chair, walker Patient needs help with:  phone, transportation, shopping, preparing meals, laundry, housework, managing medications, managing money, eating, dressing, bathing, hygiene, bathroom needs and walking Ambulation: with difficulty, uses a walker and wheelchair Fall Risk: 
Fall Risk Assessment, last 12 mths 12/6/2019 Able to walk? Yes Fall in past 12 months? Yes Fall with injury? Yes  
Number of falls in past 12 months 2 Fall Risk Score 3 Abuse Screen: 
Patient is not abused Cognitive Screening Has your family/caregiver stated any concerns about your memory: yes - at baseline, no change in dementia Patient Care Team  
Patient Care Team: 
[place]o NP as PCP - General (Family Medicine) [place] NP as PCP - [facility] Empaneled Provider Lili Taveras MD (Breast Surgery) Eliza Johnson MD as Physician (Hematology and Oncology) Assessment/Plan Education and counseling provided: 
Are appropriate based on today's review and evaluation Pneumococcal Vaccine Influenza Vaccine Screening Mammography Colorectal cancer screening tests Bone mass measurement (DEXA) Screening for glaucoma Diabetes screening test 
 
Diagnoses and all orders for this visit: 
 
1. Medicare annual wellness visit, subsequent 2. Screening for alcoholism Health Maintenance Due Topic Date Due  Shingrix Vaccine Age 50> (1 of 2) 12/03/2007  PAP AKA CERVICAL CYTOLOGY  08/09/2020  Medicare Yearly Exam  08/18/2020  Flu Vaccine (1) 09/01/2020

## 2020-12-01 NOTE — TELEPHONE ENCOUNTER
Appears was done in 2018 by Dr Wm Beasley. Have them call his office to see when her next colonoscopy is needed. I do not believe she is due yet.

## 2020-12-01 NOTE — TELEPHONE ENCOUNTER
----- Message from Kylie Baez sent at 12/1/2020  1:13 PM EST -----  Regarding: YAJAIRA Newton/Telephone  Contact: [phone]  Caller's first and last name: Douglas Bridges ()   Reason for call: Pt info needed  Callback required yes/no and why: Yes, to verify last colonoscopy the pt had.   Best contact number(s): [phone]  Details to clarify the request: Caller needs to know when his wife's last colonoscopy was

## 2020-12-03 NOTE — TELEPHONE ENCOUNTER
verified. Informed pt per Lidia's suggestion. Returned call and left Dr. Kwame Grullon phone # to .

## 2020-12-03 NOTE — TELEPHONE ENCOUNTER
Please see my previous response. I responded to this already.   She needs to call Dr Cinda Perez office

## 2020-12-03 NOTE — TELEPHONE ENCOUNTER
----- Message from Whitley Jaems sent at 12/2/2020  4:08 PM EST -----  Regarding: NP Polo/Telephone  Caller's first and last name: Eliza Fenton ()     Reason for call: Pt info needed    Callback required yes/no and why: Yes, to verify last colonoscopy the pt had.     Best contact number(s): [phone]    Details to clarify the request: Caller needs to know when his wife's last colonoscopy was(?)

## 2020-12-09 NOTE — TELEPHONE ENCOUNTER
Patient's  says he has been trying to call Dr Randy Martinez office since Friday and leaves messages, but cannot get them to call him back. He is getting very frustrated with that office and would like to know if he can take her elsewhere if at all possible.  He can be reached at [phone]

## 2020-12-10 NOTE — TELEPHONE ENCOUNTER
Pt has been scheduled for Colonoscopy Consultation on Jan. 13, 2021 at 1:15pm. Last OV and Demographic has been faxed over to facility. Pt's  will be called and notified of scheduled appointment.

## 2020-12-14 NOTE — PROGRESS NOTES
Neurology follow-up note Chief Complaint Patient presents with  Follow-up  
  shunt placed SUBJECTIVE Terra Merino is a 63 y.o. female who presented to the neurology office for management of dementia. She was diagnosed with dementia around 2 years ago. She is taking memantine 10 mg po bid and aricept 10 mg daily. No side effect. There was been mild worsening since the last visit. She does not know where things are in the kitchen. She does not drive. Her  has to take out clothes from the closet. He fixes meals. No agitation or hallucinations. She did have breast cancer. She is s/p resection. No chemotherapy but did receive RT. Interval history: She has problems with walking, counting. She cannot take a bath or put on clothes. She can eat by herself. She is taking Aricept 10 mg daily and memantine 10 mg p.o. twice daily She did have  shunt placed in Nov 2020 Current Outpatient Medications Medication Sig  
 donepeziL (ARICEPT) 10 mg tablet TAKE 1 TABLET BY MOUTH NIGHTLY  memantine (NAMENDA) 10 mg tablet TAKE 1 TABLET BY MOUTH TWICE A DAY  levothyroxine (SYNTHROID) 25 mcg tablet Take 1 Tab by mouth Daily (before breakfast).  acetaminophen (TYLENOL) 325 mg tablet Take 2 Tabs by mouth every four (4) hours as needed for Pain.  OTHER 0.5 Tabs every other day. BLOOD BUILDER SUPPLEMENT  diphenhydrAMINE (BENADRYL) 25 mg tablet Take 50 mg by mouth nightly.  pravastatin (PRAVACHOL) 10 mg tablet take 1 tablet by mouth at bedtime R [address] to evaluate and treat for generalized weakness.  calcium-cholecalciferol, D3, (CALTRATE 600+D) tablet Take 1 Tab by mouth two (2) times a day.  multivitamin with iron tablet Take 1 Tab by mouth daily.  omega 3-dha-epa-fish oil (FISH OIL) 100-160-1,000 mg cap Take 1 Cap by mouth every other day. No current facility-administered medications for this visit. Past Medical History:  
Diagnosis Date  Acoustic neuroma ([facility]  Breast cancer ([facility] 2008 Left  Breast cancer ([facility] 07/2019 RIGHT  Communicating hydrocephalus ([facility]  Hyperlipidemia 6/30/2017  Memory disorder  Radiation therapy complication Lt breast 2008--no chemo  Thyroid disease HYPOTHYROID Past Surgical History:  
Procedure Laterality Date  HX BREAST LUMPECTOMY Left 2008  HX BREAST LUMPECTOMY Right 6/27/2019 RIGHT BREAST LUMPECTOMY, RIGHT BREAST SENTINEL NODE BIOPSY performed by Estela Alcantar MD at [facility] OR  
 [address] W/CLIP AND SPECIMEN Left 2008  
 multiple areas sampled--one area breast CA Family History Problem Relation Age of Onset  Lung Disease Brother  Breast Cancer Sister 43  Cancer Sister BREAST CANCER  Anesth Problems Neg Hx Social History Tobacco Use  Smoking status: Never Smoker  Smokeless tobacco: Never Used Substance Use Topics  Alcohol use: No  
 Drug use: No  
 
 
REVIEW OF SYSTEMS:  
A ten system review of constitutional, cardiovascular, respiratory, musculoskeletal, endocrine, skin, SHEENT, genitourinary, psychiatric and neurologic systems was obtained and is unremarkable except dementia. EXAMINATION:  
Visit Vitals LMP  (LMP Unknown) General:  
General appearance: Pt is in no acute distress Distal pulses are preserved Neurological Examination:  
Mental Status: AAO x 2. Speech is fluent. Follows commands, has poor fund of knowledge, attention, short term recall, comprehension and insight. Laboratory review:  
Results for orders placed or performed during the hospital encounter of 10/01/20 CELL COUNT, CSF Result Value Ref Range CSF TUBE NO. 1    
 CSF COLOR STRAW (A) COL    
 SPUN COLOR COLORLESS COL  
 CSF APPEARANCE CLEAR CLEAR    
 CSF RBCs 425 (H) 0 /cu mm  
 CSF WBCs 0 0 - 5 /cu mm GLUCOSE, CSF Result Value Ref Range  Tube No. 1    
 Glucose,CSF 59 40 - 70 MG/DL PROTEIN, CSF Result Value Ref Range Tube No. 1    
 Protein,CSF 59 (H) 15 - 45 MG/DL  
GLUCOSE, POC Result Value Ref Range Glucose (POC) 100 65 - 100 mg/dL Performed by Mirian Suresh GLUCOSE, POC Result Value Ref Range Glucose (POC) 121 (H) 65 - 100 mg/dL Performed by Amari Sosa (CON) GLUCOSE, POC Result Value Ref Range Glucose (POC) 107 (H) 65 - 100 mg/dL Performed by Alberto Upton   PCT Imaging review: 
11/6/2020 CT scan without contrast 
Stable position of cranial shunt Slight increase in size of ventricular system 8/27/2020 MRI brain with and without contrast 
1. No change in size since 12/26/2019 of left vestibular schwannoma involving 
the internal auditory canal and CP angle cistern. 2. Further increase in ventricular size and increase in periventricular T2 
hyperintensity, which may be related to interstitial edema. 10/12/2018 MRI brain with and without contrast 
No acute finding. Increasing size of left acoustic schwannoma. No other changes otherwise. Documentation review: 
None Assessment/Plan:  
Rebecca Nolen is a 63 y.o. female who presented to the neurology office for management of dementia. Patient is taking Aricept 10 mg daily and memantine 10 mg p.o. twice daily. Refilled medications for a year. We will see the patient back in a year. She is s/p shunt for hydrocephalus. FU 1 yr.  
 
3 most recent PHQ Screens 10/22/2020 PHQ Not Done - Little interest or pleasure in doing things Not at all Feeling down, depressed, irritable, or hopeless Not at all Total Score PHQ 2 0 Primary care to address possible depression if PHQ-9 score is more than 9. ICD-10-CM ICD-9-CM 1. Alzheimer's dementia without behavioral disturbance, unspecified timing of dementia onset ([facility]  G30.9 331.0 F02.80 294.10 Woody Miramontes MD 
Neurologist 
 
CC: Casimiro Horowitz NP Fax: [phone] This note was created using voice recognition software. Despite editing, there may be syntax errors. Minor Mess was seen by synchronous (real-time) audio-video technology on 12/16/20. Consent: 
She and/or her healthcare decision maker is aware that this patient-initiated Telehealth encounter is a billable service, with coverage as determined by her insurance carrier. She is aware that she may receive a bill and has provided verbal consent to proceed: Yes I was in the office while conducting this encounter. Pursuant to the emergency declaration under the [facility] waiver authority and the [employer] and [employer] Act, this Virtual  Visit was conducted, with patient's consent, to reduce the patient's risk of exposure to COVID-19 and provide continuity of care for an established patient. Services were provided through a video synchronous discussion virtually to substitute for in-person clinic visit.

## 2021-01-01 ENCOUNTER — DOCUMENTATION ONLY (OUTPATIENT)
Dept: SURGERY | Age: 66
End: 2021-01-01

## 2021-01-01 ENCOUNTER — TELEPHONE (OUTPATIENT)
Dept: FAMILY MEDICINE CLINIC | Age: 66
End: 2021-01-01

## 2021-01-01 ENCOUNTER — DOCUMENTATION ONLY (OUTPATIENT)
Dept: NEUROLOGY | Age: 66
End: 2021-01-01

## 2021-01-01 ENCOUNTER — HOSPITAL ENCOUNTER (OUTPATIENT)
Dept: CT IMAGING | Age: 66
Discharge: HOME | End: 2021-02-16
Attending: NEUROLOGICAL SURGERY | Admitting: NEUROLOGICAL SURGERY
Payer: MEDICARE

## 2021-01-01 ENCOUNTER — VIRTUAL VISIT (OUTPATIENT)
Dept: FAMILY MEDICINE CLINIC | Age: 66
End: 2021-01-01
Payer: MEDICARE

## 2021-01-01 ENCOUNTER — TRANSCRIBE ORDER (OUTPATIENT)
Dept: SCHEDULING | Age: 66
End: 2021-01-01

## 2021-01-01 ENCOUNTER — DOCUMENTATION ONLY (OUTPATIENT)
Dept: FAMILY MEDICINE CLINIC | Age: 66
End: 2021-01-01

## 2021-01-01 DIAGNOSIS — G91.0 COMMUNICATING HYDROCEPHALUS (HCC): Primary | ICD-10-CM

## 2021-01-01 DIAGNOSIS — G30.0 EARLY ONSET ALZHEIMER'S DEMENTIA WITHOUT BEHAVIORAL DISTURBANCE (HCC): Primary | ICD-10-CM

## 2021-01-01 DIAGNOSIS — F02.80 EARLY ONSET ALZHEIMER'S DEMENTIA WITHOUT BEHAVIORAL DISTURBANCE (HCC): Primary | ICD-10-CM

## 2021-01-01 DIAGNOSIS — E78.5 HYPERLIPIDEMIA, UNSPECIFIED HYPERLIPIDEMIA TYPE: ICD-10-CM

## 2021-01-01 DIAGNOSIS — G91.0 COMMUNICATING HYDROCEPHALUS (HCC): ICD-10-CM

## 2021-01-01 PROCEDURE — G8510 SCR DEP NEG, NO PLAN REQD: HCPCS | Performed by: FAMILY MEDICINE

## 2021-01-01 PROCEDURE — 70450 CT HEAD/BRAIN W/O DYE: CPT

## 2021-01-01 PROCEDURE — G8427 DOCREV CUR MEDS BY ELIG CLIN: HCPCS | Performed by: FAMILY MEDICINE

## 2021-01-01 PROCEDURE — 3017F COLORECTAL CA SCREEN DOC REV: CPT | Performed by: FAMILY MEDICINE

## 2021-01-01 PROCEDURE — G9899 SCRN MAM PERF RSLTS DOC: HCPCS | Performed by: FAMILY MEDICINE

## 2021-01-01 PROCEDURE — 99214 OFFICE O/P EST MOD 30 MIN: CPT | Performed by: FAMILY MEDICINE

## 2021-01-01 RX ORDER — LEVOTHYROXINE SODIUM 25 UG/1
25 TABLET ORAL
Qty: 90 TAB | Refills: 0 | Status: SHIPPED | OUTPATIENT
Start: 2021-01-01 | End: 2021-01-01 | Stop reason: SDUPTHER

## 2021-01-01 RX ORDER — PRAVASTATIN SODIUM 10 MG/1
TABLET ORAL
Qty: 30 TAB | Refills: 6 | Status: SHIPPED | OUTPATIENT
Start: 2021-01-01

## 2021-01-01 RX ORDER — DOXEPIN HYDROCHLORIDE 10 MG/1
10 CAPSULE ORAL
Qty: 30 CAPSULE | Refills: 3 | Status: SHIPPED | OUTPATIENT
Start: 2021-01-01

## 2021-01-01 RX ORDER — LEVOTHYROXINE SODIUM 25 UG/1
25 TABLET ORAL
Qty: 90 TABLET | Refills: 0 | Status: SHIPPED | OUTPATIENT
Start: 2021-01-01

## 2021-01-07 NOTE — TELEPHONE ENCOUNTER
Willy is requesting a refill on med    Requested Prescriptions     Pending Prescriptions Disp Refills    levothyroxine (SYNTHROID) 25 mcg tablet 90 Tab 0     Sig: Take 1 Tab by mouth Daily (before breakfast).

## 2021-02-15 NOTE — TELEPHONE ENCOUNTER
I suggest that he call the neurologist to make sure that there is not concern for returning hydrocephalus causing her symptoms. If she is having worsening weakness  Or worsening altered mental status or concerns for dehydration then needs to go to ER for immediate evaluation.

## 2021-02-15 NOTE — TELEPHONE ENCOUNTER
notified and voiced understanding. He states she's eating and drinking ok.  She is scheduled for a head CT tomorrow and he will be talking with the neurologist tomorrow as well

## 2021-02-15 NOTE — TELEPHONE ENCOUNTER
Message from Georgette Saint Dr Polo/ level 1  Received:  Today  Message Contents   Wero Acosta [facility] [place] Front Office Pool             Level 1/Escalated Issue       Caller's first and last name and relationship (if not the patient):       Best contact number(s):[phone]       What are the symptoms: daily decreasing appetite       Transfer successful - yes/no (include outcome): no no answer       Transfer declined - yes/no (include reason): no       Was caller advised to seek appropriate level of care - yes/no: yes       Details to clarify the request: Mr Diana Costa is requesting a call back for assistance with wife's daily decreasing appetite         Mary Sainz

## 2021-02-15 NOTE — TELEPHONE ENCOUNTER
verified by spouse. He is concerned about pt's decreased appetite x 2 weeks. Unsure if pt is experiencing any dental pain. Has scheduled dental appt Thursday, but unsure r/t incoming ice storm on the same day. Unsure of wt loss, since pt is unable to stand on scale on her own without assistance. Spouse wants to know what to do or if there is anything that can be prescribed.

## 2021-04-05 NOTE — TELEPHONE ENCOUNTER
Requested Prescriptions     Pending Prescriptions Disp Refills    pravastatin (PRAVACHOL) 10 mg tablet 30 Tab 6     Sig: take 1 tablet by mouth at bedtime

## 2021-04-08 NOTE — TELEPHONE ENCOUNTER
Received fax from [facility] in [place] for refill of:    Requested Prescriptions     Pending Prescriptions Disp Refills    levothyroxine (SYNTHROID) 25 mcg tablet 90 Tab 0     Sig: Take 1 Tab by mouth Daily (before breakfast).

## 2021-04-27 NOTE — PROGRESS NOTES
Copy of genetic testing sent to [facility] per patient request.      Confirmation fax received.

## 2021-07-07 NOTE — TELEPHONE ENCOUNTER
Requested Prescriptions     Pending Prescriptions Disp Refills    levothyroxine (SYNTHROID) 25 mcg tablet 90 Tablet 0     Sig: Take 1 Tablet by mouth Daily (before breakfast).

## 2021-07-29 NOTE — TELEPHONE ENCOUNTER
----- Message from Linda Jimenez sent at 7/29/2021  1:34 PM EDT -----  Regarding: Dr Marilia Bowers Message/Vendor Calls    Caller's first and last name:Mr David Eid      Reason for call:Home health Care for wife      Callback required yes/no and why:yes to discuss Home health care options      Best contact number(s):[phone]       Details to clarify the request: Mr David Eid is requesting a call back for assistance obtaining home health care for pt.  Mr. David Eid advised pt has dementia, cannot walk and talk      Linda Jimenez

## 2021-07-29 NOTE — TELEPHONE ENCOUNTER
Please call and advise that [facility] can offer nursing, PT and OT. If they are interested in a care aide this is only covered through Medicaid, not Medicare. If pt dose not have Medicaid then they would need to hire someone privately. Also recommend reaching out to insurance to discuss options.

## 2021-07-30 NOTE — TELEPHONE ENCOUNTER
Spoke with  and he would like for someone to evaluate mrs. Paolo Wiggins to see what kailash services may be provided.  He will call back on Monday to see if we will have any appointments

## 2021-08-03 NOTE — PROGRESS NOTES
THIS VISIT WAS COMPLETED VIRTUALLY USING DOXY. KELSIE HUFFMAN  Deedee Snellen is a 63 y.o. female who presents with her  to follow-up on dementia. Received patient in bed. She is bedbound. With the assistance of her  can make her way to the living room and sit in the recliner. She does not speak at all.  has to provide all care and ADLs. He is wondering what resources he can find for help. Is exhausted the aid service through [employer]. Only gets 31 days 4 hours at a time. He is wondering if hospice would be appropriate. Also having trouble with her sleep cycle. She will go to bed at 10, wake up at 4 in the morning. He will get her up, washed, dressed and into the living room and around that time she 1 to fall back asleep. Sleeps on and off 6 hours during the day as well. This is causing  to be short on sleep    PMHx:  Past Medical History:   Diagnosis Date    Acoustic neuroma ([facility]     Breast cancer ([facility] [facility] 2008    Left    Breast cancer ([facility] [address] 07/2019    RIGHT    Communicating hydrocephalus ([facility] [address] [facility]     Hyperlipidemia 6/30/2017    Memory disorder     Radiation therapy complication     Lt breast 2008--no chemo    Thyroid disease     HYPOTHYROID       Meds:   Current Outpatient Medications   Medication Sig Dispense Refill    doxepin (SINEquan) 10 mg capsule Take 1 Capsule by mouth nightly. 30 Capsule 3    levothyroxine (SYNTHROID) 25 mcg tablet Take 1 Tablet by mouth Daily (before breakfast). 90 Tablet 0    pravastatin (PRAVACHOL) 10 mg tablet take 1 tablet by mouth at bedtime 30 Tab 6    donepeziL (ARICEPT) 10 mg tablet TAKE 1 TABLET BY MOUTH NIGHTLY 30 Tab 11    memantine (NAMENDA) 10 mg tablet TAKE 1 TABLET BY MOUTH TWICE A  Tab 3    acetaminophen (TYLENOL) 325 mg tablet Take 2 Tabs by mouth every four (4) hours as needed for Pain. 10 Tab 0    diphenhydrAMINE (BENADRYL) 25 mg tablet Take 50 mg by mouth nightly.      [facility] to evaluate and treat for generalized weakness. 1 Each 0    calcium-cholecalciferol, D3, (CALTRATE 600+D) tablet Take 1 Tab by mouth two (2) times a day.  multivitamin with iron tablet Take 1 Tab by mouth daily.  OTHER 0.5 Tabs every other day. BLOOD BUILDER SUPPLEMENT      omega 3-dha-epa-fish oil (FISH OIL) 100-160-1,000 mg cap Take 1 Cap by mouth every other day. (Patient not taking: Reported on 8/3/2021)         Allergies: Allergies   Allergen Reactions    Pcn [Penicillins] Hives       Smoker:  Social History     Tobacco Use   Smoking Status Never Smoker   Smokeless Tobacco Never Used       ETOH:   Social History     Substance and Sexual Activity   Alcohol Use No       FH:   Family History   Problem Relation Age of Onset    Lung Disease Brother     Breast Cancer Sister         43    Cancer Sister         BREAST CANCER    Anesth Problems Neg Hx        ROS:   As listed in HPI. In addition:  Constitutional:   No headache, fever, fatigue, weight loss or weight gain      Cardiac:    No chest pain      Resp:   No cough or shortness of breath      Neuro   No loss of consciousness, dizziness, seizures      Physical Exam:  There were no vitals taken for this visit. GEN: No apparent distress. Awake, nonverbal, flat affect. EXT: Well perfused. No edema. SKIN: No obvious rashes. Due to this being a TeleHealth evaluation, many elements of the physical examination are unable to be assessed. Assessment and Plan     Alzheimer's dementia  Fast stage 7b, 7C, 7E4 loss of ability to speak, ambulate without assistance, smile  Would qualify for hospice and I believe this would be an appropriate resource for family. They would like a hospice consult. Sleep disturbance  Is sleeping through most of the night waking up very early and then sleeping a lot during the day. We talked about light therapy. He is already trying melatonin 3 mg.   Would not be unreasonable to try doxepin but warned of possible paradoxical reaction to a sleep aid      ICD-10-CM ICD-9-CM    1. Early onset Alzheimer's dementia without behavioral disturbance ([facility] [address]  G30.0 331.0 REFERRAL TO HOSPICE    F02.80 294.10          Pursuant to the emergency declaration under the [address] [place] [address] waiver authority and the [employer] Act, this Virtual  Visit was conducted, with patient's consent, to reduce the patient's risk of exposure to COVID-19 and provide continuity of care for an established patient. Services were provided through a video synchronous discussion virtually to substitute for in-person clinic visit.

## 2021-08-03 NOTE — PROGRESS NOTES
1. Have you been to the ER, urgent care clinic since your last visit? Hospitalized since your last visit? No    2. Have you seen or consulted any other health care providers outside of the [address] [place] [address] since your last visit? Include any pap smears or colon screening.  No    Health Maintenance Due   Topic Date Due    Shingrix Vaccine Age 49> (1 of 2) Never done    PAP AKA CERVICAL CYTOLOGY  08/09/2020    Lipid Screen  11/08/2020    Breast Cancer Screen Mammogram  06/04/2021     Chief Complaint   Patient presents with    Follow-up

## 2021-08-03 NOTE — PROGRESS NOTES
Referral, insurance, demographics, and progress note faxed to [phone] and confirmation page recv'd.

## 2021-10-04 NOTE — TELEPHONE ENCOUNTER
Cam of [address] [place] [address] wanted to let Dr Sirena Armendariz know of the patient's transitioning to death.  They have been treating her for alzheimer's

## 2022-01-09 NOTE — TELEPHONE ENCOUNTER
Per Dr. Tamir Hayden the order for breast biopsy was put in and Zachary Woo at [facility] notified and will refer her to Dr. Wood Jean if needed
Sarahy Weiner calling to get an order for ultra sound guided R breast biopsy of a mass. If positive for cancer  who do you want them to refer her to?     Call [phone]
Chest pain
